# Patient Record
Sex: MALE | Race: WHITE | NOT HISPANIC OR LATINO | Employment: OTHER | URBAN - METROPOLITAN AREA
[De-identification: names, ages, dates, MRNs, and addresses within clinical notes are randomized per-mention and may not be internally consistent; named-entity substitution may affect disease eponyms.]

---

## 2017-02-22 ENCOUNTER — PATIENT OUTREACH (OUTPATIENT)
Dept: ADMINISTRATIVE | Facility: HOSPITAL | Age: 57
End: 2017-02-22

## 2017-03-08 ENCOUNTER — OFFICE VISIT (OUTPATIENT)
Dept: INTERNAL MEDICINE | Facility: CLINIC | Age: 57
End: 2017-03-08
Attending: INTERNAL MEDICINE
Payer: COMMERCIAL

## 2017-03-08 VITALS
SYSTOLIC BLOOD PRESSURE: 108 MMHG | OXYGEN SATURATION: 96 % | HEART RATE: 76 BPM | HEIGHT: 76 IN | BODY MASS INDEX: 28.19 KG/M2 | DIASTOLIC BLOOD PRESSURE: 72 MMHG | WEIGHT: 231.5 LBS

## 2017-03-08 DIAGNOSIS — Z12.12 SCREENING FOR COLORECTAL CANCER: ICD-10-CM

## 2017-03-08 DIAGNOSIS — Z12.11 SCREENING FOR COLORECTAL CANCER: ICD-10-CM

## 2017-03-08 DIAGNOSIS — H91.92 HEARING LOSS OF LEFT EAR, UNSPECIFIED HEARING LOSS TYPE: ICD-10-CM

## 2017-03-08 DIAGNOSIS — K21.9 GASTROESOPHAGEAL REFLUX DISEASE, ESOPHAGITIS PRESENCE NOT SPECIFIED: ICD-10-CM

## 2017-03-08 DIAGNOSIS — Z11.59 NEED FOR HEPATITIS C SCREENING TEST: ICD-10-CM

## 2017-03-08 DIAGNOSIS — Z00.00 ANNUAL PHYSICAL EXAM: Primary | ICD-10-CM

## 2017-03-08 PROCEDURE — 1160F RVW MEDS BY RX/DR IN RCRD: CPT | Mod: S$GLB,,, | Performed by: INTERNAL MEDICINE

## 2017-03-08 PROCEDURE — 99214 OFFICE O/P EST MOD 30 MIN: CPT | Mod: S$GLB,,, | Performed by: INTERNAL MEDICINE

## 2017-03-08 PROCEDURE — 99999 PR PBB SHADOW E&M-EST. PATIENT-LVL IV: CPT | Mod: PBBFAC,,, | Performed by: INTERNAL MEDICINE

## 2017-03-08 NOTE — PROGRESS NOTES
"Subjective:       Patient ID: Valentin Brito is a 56 y.o. male.    Chief Complaint: Annual Exam    HPI Comments: Here for annual exam    Taking protonix daily. He denies daily symptoms of heartburn with eating but endorses at night when he lies only on his right side he will have sensation of contents filling his esophagus or a "tightness in throat". He denies choking, coughing, sour taste in mouth. He eats dinner at least 4-5 hours prior to lying down.     Left sided hearing loss. He reports it has been ongoing for years and has continued to worsen to the point where he is now turning his right ear to others so he can hear. He denies tinnitus, vertigo, nasal congestion, rhinorrhea.       Review of Systems   Constitutional: Negative for appetite change, chills, fever and unexpected weight change.   HENT: Positive for hearing loss. Negative for congestion, ear discharge, nosebleeds, postnasal drip, rhinorrhea, sinus pressure, sneezing, sore throat and trouble swallowing.    Eyes: Negative for visual disturbance.   Respiratory: Negative for cough, chest tightness and shortness of breath.    Cardiovascular: Negative for chest pain and leg swelling.   Gastrointestinal: Negative for abdominal pain, blood in stool, constipation, diarrhea, nausea and vomiting.   Endocrine: Negative for polydipsia and polyuria.   Genitourinary: Negative for decreased urine volume, difficulty urinating, dysuria, frequency and urgency.   Musculoskeletal: Negative for gait problem.   Skin: Negative for rash.   Neurological: Negative for dizziness and numbness.   Psychiatric/Behavioral: The patient is not nervous/anxious.        History reviewed. No pertinent past medical history.  Past Surgical History:   Procedure Laterality Date    ABDOMINAL HERNIA REPAIR      HEEL SPUR SURGERY Right     HERNIA REPAIR Bilateral     TONSILLECTOMY       Family History   Problem Relation Age of Onset    Hyperlipidemia Father      Social History     Social " "History    Marital status:      Spouse name: N/A    Number of children: N/A    Years of education: N/A     Occupational History    Not on file.     Social History Main Topics    Smoking status: Never Smoker    Smokeless tobacco: Not on file    Alcohol use Yes    Drug use: No    Sexual activity: Not on file     Other Topics Concern    Not on file     Social History Narrative         Objective:      Vitals:    03/08/17 1058   BP: 108/72   Pulse: 76   SpO2: 96%   Weight: 105 kg (231 lb 7.7 oz)   Height: 6' 4" (1.93 m)      Physical Exam   Constitutional: He is oriented to person, place, and time. He appears well-developed and well-nourished. No distress.   HENT:   Head: Normocephalic and atraumatic.   Mouth/Throat: Oropharynx is clear and moist. No oropharyngeal exudate.   Eyes: Conjunctivae and EOM are normal. Pupils are equal, round, and reactive to light. No scleral icterus.   Neck: No thyromegaly present.   Cardiovascular: Normal rate, regular rhythm and normal heart sounds.    No murmur heard.  Pulmonary/Chest: Effort normal and breath sounds normal. He has no wheezes. He has no rales.   Abdominal: Soft. He exhibits no distension. There is no tenderness.   Musculoskeletal: He exhibits no edema or tenderness.   Lymphadenopathy:     He has no cervical adenopathy.   Neurological: He is alert and oriented to person, place, and time.   Skin: Skin is warm and dry.   Psychiatric: He has a normal mood and affect. His behavior is normal.       Assessment:       1. Annual physical exam    2. Need for hepatitis C screening test    3. Screening for colorectal cancer    4. Gastroesophageal reflux disease, esophagitis presence not specified    5. Hearing loss of left ear, unspecified hearing loss type        Plan:       Valentin was seen today for annual exam.    Diagnoses and all orders for this visit:    Annual physical exam  -     Comprehensive metabolic panel; Future  -     Hemoglobin A1c; Future  -     Lipid " panel; Future  -     PSA, Screening; Future    Need for hepatitis C screening test  -     Hepatitis C antibody; Future    Screening for colorectal cancer  -     Ambulatory referral to Gastroenterology    Gastroesophageal reflux disease, esophagitis presence not specified  -     Ambulatory Referral to Gastroenterology    Hearing loss of left ear, unspecified hearing loss type  -     Ambulatory Referral to ENT                 Side effects of medication(s) were discussed in detail and patient voiced understanding.  Patient will call back for any issues or complications.

## 2017-03-08 NOTE — MR AVS SNAPSHOT
[facility]  [address]  [place] [address]  Phone: [phone]  Fax: [phone]                  Valentin Brito   3/8/2017 11:00 AM   Office Visit    Description:  Male : 1960   Provider:  Francisco Mcnally MD   Department:  [facility]           Reason for Visit     Annual Exam           Diagnoses this Visit        Comments    Need for hepatitis C screening test    -  Primary     Need for Tdap vaccination         Screening for colorectal cancer         TSH (thyroid-stimulating hormone deficiency)         Gastroesophageal reflux disease, esophagitis presence not specified         Annual physical exam         Hearing loss of left ear, unspecified hearing loss type                To Do List           Future Appointments        Provider Department Dept Phone    3/9/2017 8:30 AM LAB, BAP [facility] [phone]    2017 10:00 AM AUDIOGRAM, AUDIO [facility] Audiology [phone]    2017 10:30 AM Aditya Alexander III, MD [facility] Otorhinolaryngology [phone]      Goals (5 Years of Data)     None      [facility] On [facility]     [facility] Care Line -  Assistance  Registered nurses in the [facility] Center provide clinical advisement, health education, appointment booking, and other advisory services.  Call for this free service at [phone].             Medications           Message regarding Medications     Verify the changes and/or additions to your medication regime listed below are the same as discussed with your clinician today.  If any of these changes or additions are incorrect, please notify your healthcare provider.             Verify that the below list of medications is an accurate representation of the medications you are currently taking.  If none reported, the list may be blank. If incorrect, please contact your healthcare provider. Carry this list with you in case of emergency.          "  Current Medications     atorvastatin (LIPITOR) 20 MG tablet Take 1 tablet (20 mg total) by mouth once daily.    pantoprazole (PROTONIX) 40 MG tablet Take 1 tablet (40 mg total) by mouth 2 (two) times daily.           Clinical Reference Information           Your Vitals Were     BP Pulse Height Weight SpO2 BMI    108/72 76 6' 4" (1.93 m) 105 kg (231 lb 7.7 oz) 96% 28.18 kg/m2      Blood Pressure          Most Recent Value    BP  108/72      Allergies as of 3/8/2017     No Known Allergies      Immunizations Administered on Date of Encounter - 3/8/2017     None      Orders Placed During Today's Visit      Normal Orders This Visit    Ambulatory Referral to ENT     Ambulatory referral to Gastroenterology     Ambulatory Referral to Gastroenterology     Future Labs/Procedures Expected by Expires    Comprehensive metabolic panel  3/8/2017 5/7/2018    Hemoglobin A1c  3/8/2017 5/7/2018    Hepatitis C antibody  3/8/2017 4/23/2018    Lipid panel  3/8/2017 3/8/2018    PSA, Screening  3/8/2017 6/6/2017      Language Assistance Services     ATTENTION: Language assistance services are available, free of charge. Please call [phone].      ATENCIÓN: Si habla español, tiene a ac disposición servicios gratuitos de asistencia lingüística. Llame al [phone].     RUPERT Ý: N?u b?n nói Ti?ng Vi?t, có các d?ch v? h? tr? ngôn ng? mi?n phí dành cho b?n. G?i s? [phone].         [facility] complies with applicable Federal civil rights laws and does not discriminate on the basis of race, color, national origin, age, disability, or sex.        "

## 2017-03-09 ENCOUNTER — LAB VISIT (OUTPATIENT)
Dept: LAB | Facility: OTHER | Age: 57
End: 2017-03-09
Attending: INTERNAL MEDICINE
Payer: COMMERCIAL

## 2017-03-09 DIAGNOSIS — Z00.00 ANNUAL PHYSICAL EXAM: ICD-10-CM

## 2017-03-09 DIAGNOSIS — Z11.59 NEED FOR HEPATITIS C SCREENING TEST: ICD-10-CM

## 2017-03-09 LAB
ALBUMIN SERPL BCP-MCNC: 4.1 G/DL
ALP SERPL-CCNC: 71 U/L
ALT SERPL W/O P-5'-P-CCNC: 19 U/L
ANION GAP SERPL CALC-SCNC: 9 MMOL/L
AST SERPL-CCNC: 17 U/L
BILIRUB SERPL-MCNC: 0.8 MG/DL
BUN SERPL-MCNC: 16 MG/DL
CALCIUM SERPL-MCNC: 9.5 MG/DL
CHLORIDE SERPL-SCNC: 105 MMOL/L
CHOLEST/HDLC SERPL: 3.6 {RATIO}
CO2 SERPL-SCNC: 27 MMOL/L
COMPLEXED PSA SERPL-MCNC: 0.51 NG/ML
CREAT SERPL-MCNC: 1 MG/DL
EST. GFR  (AFRICAN AMERICAN): >60 ML/MIN/1.73 M^2
EST. GFR  (NON AFRICAN AMERICAN): >60 ML/MIN/1.73 M^2
ESTIMATED AVG GLUCOSE: 111 MG/DL
GLUCOSE SERPL-MCNC: 100 MG/DL
HBA1C MFR BLD HPLC: 5.5 %
HCV AB SERPL QL IA: NEGATIVE
HDL/CHOLESTEROL RATIO: 27.5 %
HDLC SERPL-MCNC: 178 MG/DL
HDLC SERPL-MCNC: 49 MG/DL
LDLC SERPL CALC-MCNC: 99.8 MG/DL
NONHDLC SERPL-MCNC: 129 MG/DL
POTASSIUM SERPL-SCNC: 4.2 MMOL/L
PROT SERPL-MCNC: 7.6 G/DL
SODIUM SERPL-SCNC: 141 MMOL/L
TRIGL SERPL-MCNC: 146 MG/DL

## 2017-03-09 PROCEDURE — 86803 HEPATITIS C AB TEST: CPT

## 2017-03-09 PROCEDURE — 83036 HEMOGLOBIN GLYCOSYLATED A1C: CPT

## 2017-03-09 PROCEDURE — 80061 LIPID PANEL: CPT

## 2017-03-09 PROCEDURE — 84153 ASSAY OF PSA TOTAL: CPT

## 2017-03-09 PROCEDURE — 36415 COLL VENOUS BLD VENIPUNCTURE: CPT

## 2017-03-09 PROCEDURE — 80053 COMPREHEN METABOLIC PANEL: CPT

## 2017-04-18 DIAGNOSIS — E78.5 HYPERLIPIDEMIA, UNSPECIFIED HYPERLIPIDEMIA TYPE: ICD-10-CM

## 2017-04-18 RX ORDER — ATORVASTATIN CALCIUM 20 MG/1
20 TABLET, FILM COATED ORAL DAILY
Qty: 90 TABLET | Refills: 1 | Status: SHIPPED | OUTPATIENT
Start: 2017-04-18 | End: 2017-05-22 | Stop reason: SDUPTHER

## 2017-04-18 NOTE — TELEPHONE ENCOUNTER
----- Message from Adri Fierro sent at 4/18/2017  3:26 PM CDT -----  Contact: :Sherry leon  _x  1st Request  _  2nd Request  _  3rd Request      Who:Sherry zayasCanovanass    Why: would like to speak to staff in regards to Atorvastatin 20mg     What Number to Call Back: [phone]    When to Expect a call back: (Before the end of the day)   -- if call after 3:00 call back will be tomorrow.

## 2017-05-01 ENCOUNTER — OFFICE VISIT (OUTPATIENT)
Dept: OTOLARYNGOLOGY | Facility: CLINIC | Age: 57
End: 2017-05-01
Payer: COMMERCIAL

## 2017-05-01 ENCOUNTER — CLINICAL SUPPORT (OUTPATIENT)
Dept: AUDIOLOGY | Facility: CLINIC | Age: 57
End: 2017-05-01
Payer: COMMERCIAL

## 2017-05-01 VITALS
HEIGHT: 77 IN | TEMPERATURE: 98 F | HEART RATE: 57 BPM | DIASTOLIC BLOOD PRESSURE: 80 MMHG | BODY MASS INDEX: 27.07 KG/M2 | WEIGHT: 229.25 LBS | SYSTOLIC BLOOD PRESSURE: 113 MMHG

## 2017-05-01 DIAGNOSIS — J34.2 NASAL SEPTAL DEVIATION: ICD-10-CM

## 2017-05-01 DIAGNOSIS — Z77.122 HISTORY OF EXPOSURE TO NOISE: ICD-10-CM

## 2017-05-01 PROCEDURE — 1160F RVW MEDS BY RX/DR IN RCRD: CPT | Mod: S$GLB,,, | Performed by: OTOLARYNGOLOGY

## 2017-05-01 PROCEDURE — 99999 PR PBB SHADOW E&M-EST. PATIENT-LVL III: CPT | Mod: PBBFAC,,, | Performed by: OTOLARYNGOLOGY

## 2017-05-01 PROCEDURE — 92550 TYMPANOMETRY & REFLEX THRESH: CPT | Mod: S$GLB,,, | Performed by: AUDIOLOGIST

## 2017-05-01 PROCEDURE — 99203 OFFICE O/P NEW LOW 30 MIN: CPT | Mod: S$GLB,,, | Performed by: OTOLARYNGOLOGY

## 2017-05-01 PROCEDURE — 92557 COMPREHENSIVE HEARING TEST: CPT | Mod: S$GLB,,, | Performed by: AUDIOLOGIST
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
## 2017-05-01 NOTE — LETTER
May 2, 2017      Francisco Mcnally MD  [address]  [address]  [facility] [address]           [facility]  [address]  [facility] [address]  Phone: [phone]  Fax: [phone]          Patient: Valentin Brito   MR Number: [id number]   YOB: 1960   Date of Visit: 5/1/2017       Dear Dr. Francisco Mcnally:    Thank you for referring Valentin Brito to me for evaluation. Attached you will find relevant portions of my assessment and plan of care.    If you have questions, please do not hesitate to call me. I look forward to following Valentin Brito along with you.    Sincerely,    Aditya Alexander III, MD    Enclosure  CC:  No Recipients    If you would like to receive this communication electronically, please contact [email or web address] or [phone] to request more information on [employer] access.    For providers and/or their staff who would like to refer a patient to [facility], please contact us through our one-stop-shop provider referral line, [facility], at [phone].    If you feel you have received this communication in error or would no longer like to receive these types of communications, please e-mail [email or web address]

## 2017-05-01 NOTE — PATIENT INSTRUCTIONS
Audiometry reviewed: asymmetric SNHL; AS > AD  Pt. is a candidate for hearing amplification for the left ( or both) ear(s)  copy of audiogram/NORMA Altman's card provided  MRI brain ordered with contrast; call for results

## 2017-05-02 ENCOUNTER — PATIENT MESSAGE (OUTPATIENT)
Dept: OTOLARYNGOLOGY | Facility: CLINIC | Age: 57
End: 2017-05-02

## 2017-05-08 ENCOUNTER — DOCUMENTATION ONLY (OUTPATIENT)
Dept: INTERNAL MEDICINE | Facility: CLINIC | Age: 57
End: 2017-05-08

## 2017-05-08 NOTE — PROGRESS NOTES
Outside records received and reviewed    EGD and colonoscopy done 5/3/17 Grade I internal hemorrhoids, multiple polyp (largest 8mm), and few sigmoid diverticula. Small hiatal hernia and erythema of antrum.;    Records have been submitted to [facility] medical records department to be scanned in to chart in EPIC under the media tab.

## 2017-05-09 ENCOUNTER — HOSPITAL ENCOUNTER (OUTPATIENT)
Dept: RADIOLOGY | Facility: HOSPITAL | Age: 57
Discharge: HOME | End: 2017-05-09
Attending: OTOLARYNGOLOGY
Payer: COMMERCIAL

## 2017-05-09 PROCEDURE — 70553 MRI BRAIN STEM W/O & W/DYE: CPT | Mod: 26,,, | Performed by: RADIOLOGY

## 2017-05-09 PROCEDURE — 25500020 PHARM REV CODE 255: Performed by: OTOLARYNGOLOGY

## 2017-05-09 PROCEDURE — 70553 MRI BRAIN STEM W/O & W/DYE: CPT | Mod: TC

## 2017-05-09 PROCEDURE — A9585 GADOBUTROL INJECTION: HCPCS | Performed by: OTOLARYNGOLOGY

## 2017-05-09 RX ORDER — GADOBUTROL 604.72 MG/ML
10 INJECTION INTRAVENOUS
Status: COMPLETED | OUTPATIENT
Start: 2017-05-09 | End: 2017-05-09

## 2017-05-09 RX ADMIN — GADOBUTROL 10 ML: 604.72 INJECTION INTRAVENOUS at 09:05

## 2017-05-22 DIAGNOSIS — K21.9 GASTROESOPHAGEAL REFLUX DISEASE, ESOPHAGITIS PRESENCE NOT SPECIFIED: ICD-10-CM

## 2017-05-22 DIAGNOSIS — E78.5 HYPERLIPIDEMIA, UNSPECIFIED HYPERLIPIDEMIA TYPE: ICD-10-CM

## 2017-05-22 RX ORDER — PANTOPRAZOLE SODIUM 40 MG/1
TABLET, DELAYED RELEASE ORAL
Qty: 180 TABLET | Refills: 3 | Status: SHIPPED | OUTPATIENT
Start: 2017-05-22 | End: 2018-03-02

## 2017-05-22 RX ORDER — ATORVASTATIN CALCIUM 20 MG/1
TABLET, FILM COATED ORAL
Qty: 90 TABLET | Refills: 3 | Status: SHIPPED | OUTPATIENT
Start: 2017-05-22 | End: 2018-03-02 | Stop reason: SDUPTHER

## 2017-06-18 ENCOUNTER — PATIENT MESSAGE (OUTPATIENT)
Dept: OTOLARYNGOLOGY | Facility: CLINIC | Age: 57
End: 2017-06-18

## 2017-07-09 ENCOUNTER — PATIENT MESSAGE (OUTPATIENT)
Dept: OTOLARYNGOLOGY | Facility: CLINIC | Age: 57
End: 2017-07-09

## 2017-07-10 DIAGNOSIS — Z12.11 COLON CANCER SCREENING: ICD-10-CM

## 2017-07-18 ENCOUNTER — OFFICE VISIT (OUTPATIENT)
Dept: INTERNAL MEDICINE | Facility: CLINIC | Age: 57
End: 2017-07-18
Attending: INTERNAL MEDICINE
Payer: COMMERCIAL

## 2017-07-18 VITALS
DIASTOLIC BLOOD PRESSURE: 74 MMHG | WEIGHT: 229.06 LBS | HEART RATE: 68 BPM | SYSTOLIC BLOOD PRESSURE: 110 MMHG | BODY MASS INDEX: 27.05 KG/M2 | HEIGHT: 77 IN

## 2017-07-18 DIAGNOSIS — R36.1 BLOODY EJACULATION: Primary | ICD-10-CM

## 2017-07-18 DIAGNOSIS — D22.9 ATYPICAL MOLE: ICD-10-CM

## 2017-07-18 LAB
BILIRUB SERPL-MCNC: NORMAL MG/DL
BILIRUB UR QL STRIP: NEGATIVE
BLOOD URINE, POC: NORMAL
CLARITY UR: CLEAR
COLOR UR: YELLOW
COLOR, POC UA: NORMAL
GLUCOSE UR QL STRIP: NEGATIVE
GLUCOSE UR QL STRIP: NORMAL
HGB UR QL STRIP: ABNORMAL
KETONES UR QL STRIP: NEGATIVE
KETONES UR QL STRIP: NORMAL
LEUKOCYTE ESTERASE UR QL STRIP: NEGATIVE
LEUKOCYTE ESTERASE URINE, POC: NORMAL
NITRITE UR QL STRIP: NEGATIVE
NITRITE, POC UA: NORMAL
PH UR STRIP: 6 [PH] (ref 5–8)
PH, POC UA: 5
PROT UR QL STRIP: NEGATIVE
PROTEIN, POC: NORMAL
SP GR UR STRIP: 1.02 (ref 1–1.03)
SPECIFIC GRAVITY, POC UA: 1.02
URN SPEC COLLECT METH UR: ABNORMAL
UROBILINOGEN UR STRIP-ACNC: NEGATIVE EU/DL
UROBILINOGEN, POC UA: NORMAL

## 2017-07-18 PROCEDURE — 99999 PR PBB SHADOW E&M-EST. PATIENT-LVL IV: CPT | Mod: PBBFAC,,, | Performed by: INTERNAL MEDICINE

## 2017-07-18 PROCEDURE — 88112 CYTOPATH CELL ENHANCE TECH: CPT | Mod: 26,,, | Performed by: PATHOLOGY

## 2017-07-18 PROCEDURE — 81003 URINALYSIS AUTO W/O SCOPE: CPT

## 2017-07-18 PROCEDURE — 99214 OFFICE O/P EST MOD 30 MIN: CPT | Mod: 25,S$GLB,, | Performed by: INTERNAL MEDICINE

## 2017-07-18 PROCEDURE — 88112 CYTOPATH CELL ENHANCE TECH: CPT | Performed by: PATHOLOGY

## 2017-07-18 PROCEDURE — 81001 URINALYSIS AUTO W/SCOPE: CPT | Mod: S$GLB,,, | Performed by: INTERNAL MEDICINE

## 2017-07-18 RX ORDER — ASPIRIN 325 MG
81 TABLET ORAL DAILY
COMMUNITY
End: 2022-08-31

## 2017-08-03 ENCOUNTER — OFFICE VISIT (OUTPATIENT)
Dept: UROLOGY | Facility: CLINIC | Age: 57
End: 2017-08-03
Attending: UROLOGY
Payer: COMMERCIAL

## 2017-08-03 VITALS
DIASTOLIC BLOOD PRESSURE: 77 MMHG | BODY MASS INDEX: 27.04 KG/M2 | HEART RATE: 75 BPM | HEIGHT: 77 IN | SYSTOLIC BLOOD PRESSURE: 124 MMHG | WEIGHT: 229 LBS

## 2017-08-03 DIAGNOSIS — R30.9 PAINFUL URINATION: ICD-10-CM

## 2017-08-03 DIAGNOSIS — R36.1 HEMATOSPERMIA: Primary | ICD-10-CM

## 2017-08-03 LAB
BILIRUB SERPL-MCNC: ABNORMAL MG/DL
BLOOD URINE, POC: ABNORMAL
COLOR, POC UA: YELLOW
GLUCOSE UR QL STRIP: ABNORMAL
KETONES UR QL STRIP: ABNORMAL
LEUKOCYTE ESTERASE URINE, POC: ABNORMAL
NITRITE, POC UA: ABNORMAL
PH, POC UA: 5
PROTEIN, POC: ABNORMAL
SPECIFIC GRAVITY, POC UA: 1.01
UROBILINOGEN, POC UA: ABNORMAL

## 2017-08-03 PROCEDURE — 99244 OFF/OP CNSLTJ NEW/EST MOD 40: CPT | Mod: 25,S$GLB,, | Performed by: UROLOGY

## 2017-08-03 PROCEDURE — 81002 URINALYSIS NONAUTO W/O SCOPE: CPT | Mod: S$GLB,,, | Performed by: UROLOGY

## 2017-08-03 NOTE — LETTER
August 3, 2017        Francisco Mcnally MD  [address]  [address]  [facility] [address]             [facility]  [address] [place] [address], [facility] [address]  [facility] [address]  Phone: [phone]   Patient: Valentin Brito   MR Number: [id number]   YOB: 1960   Date of Visit: 8/3/2017       Dear Dr. Mcnally:    Thank you for referring Valentin Brito to me for evaluation. Below are the relevant portions of my assessment and plan of care.            If you have questions, please do not hesitate to call me. I look forward to following Valentin along with you.    Sincerely,      MD JOCELYNE Tate MD

## 2017-08-03 NOTE — PROGRESS NOTES
"Subjective:      Valentin Brito is a 56 y.o. male who was referred by Francisco Mcnally MD for evaluation of hematospermia.      In early July he had about 1 week of persistent hematospermia. It has since resolved. He had no other symptoms at that time including dysuria and hematuria. He was in [place] at the time and physician friend there did extensive workup including renal US and PSA, which were all normal (PSA < 1).    His only other related c/o is painful urination first thing in the morning that is relieved after he voids. He also has BM at that time. This is not every day. It has been present for 3-4 months.    The following portions of the patient's history were reviewed and updated as appropriate: allergies, current medications, past family history, past medical history, past social history, past surgical history and problem list.    Review of Systems  Constitutional: no fever or chills  ENT: no nasal congestion or sore throat  Respiratory: no cough or shortness of breath  Cardiovascular: no chest pain or palpitations  Gastrointestinal: no nausea or vomiting, tolerating diet  Genitourinary: as per HPI  Hematologic/Lymphatic: no easy bruising or lymphadenopathy  Musculoskeletal: no arthralgias or myalgias  Neurological: no seizures or tremors  Behavioral/Psych: no auditory or visual hallucinations     Objective:   Vitals: /77 (BP Location: Right arm, Patient Position: Sitting, BP Method: Automatic)   Pulse 75   Ht 6' 5" (1.956 m)   Wt 103.9 kg (229 lb)   BMI 27.16 kg/m²     Physical Exam   General: alert and oriented, no acute distress  Head: normocephalic, atraumatic  Neck: supple, no lymphadenopathy, normal ROM, no masses  Respiratory: Symmetric expansion, non-labored breathing  Cardiovascular: regular rate and rhythm, nomal pulses, no peripheral edema  Abdomen: soft, non tender, non distended, no palpable masses, no hernias, no hepatomegaly or splenomegaly  Genitourinary:   Penis: normal, no " lesions, patent orthotopic meatus, no plaques  Scrotum: no rashes or skin changes;   Testes: descended bilaterally, no masses, nontender, normal epididymides bilaterally, no hydroceles  Prostate: normal for age, normal consistency, non tender, no specific nodules, size: 25 gm; seminal vesicles not palpated  Rectum: normal rectal tone, no rectal mass, normal perineum  Lymphatic: no inguinal nodes  Skin: normal coloration and turgor, no rashes, no suspicious skin lesions noted  Neuro: alert and oriented x3, no gross deficits  Psych: normal judgment and insight, normal mood/affect and non-anxious    Lab Review   Urinalysis demonstrates negative for all components  Lab Results   Component Value Date    WBC 7.10 03/07/2016    HGB 16.5 03/07/2016    HCT 47.4 03/07/2016    MCV 87 03/07/2016     03/07/2016     Lab Results   Component Value Date    CREATININE 1.0 03/09/2017    BUN 16 03/09/2017     Lab Results   Component Value Date    PSA 0.51 03/09/2017       Assessment:     1. Hematospermia    2. Painful urination        Plan:   1. Reassured regarding hematospermia, mostly likely 2/2 mild infection. No concern for malignancy.  2. Painful morning void possible 2/2 mild BPH; discussed treatment such as flomax but he wishes to defer.  3. FU PRN

## 2017-12-13 ENCOUNTER — OFFICE VISIT (OUTPATIENT)
Dept: OTOLARYNGOLOGY | Facility: CLINIC | Age: 57
End: 2017-12-13
Payer: COMMERCIAL

## 2017-12-13 ENCOUNTER — CLINICAL SUPPORT (OUTPATIENT)
Dept: AUDIOLOGY | Facility: CLINIC | Age: 57
End: 2017-12-13

## 2017-12-13 VITALS — WEIGHT: 227.06 LBS | BODY MASS INDEX: 26.81 KG/M2 | HEIGHT: 77 IN

## 2017-12-13 DIAGNOSIS — H90.3 ASYMMETRIC SNHL (SENSORINEURAL HEARING LOSS): Primary | ICD-10-CM

## 2017-12-13 PROCEDURE — 99214 OFFICE O/P EST MOD 30 MIN: CPT | Mod: S$GLB,,, | Performed by: OTOLARYNGOLOGY

## 2017-12-13 PROCEDURE — 99999 PR PBB SHADOW E&M-EST. PATIENT-LVL II: CPT | Mod: PBBFAC,,, | Performed by: OTOLARYNGOLOGY
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
## 2017-12-13 NOTE — PROGRESS NOTES
Mr. Brito was seen for a hearing aid consultation on today's date. He was accompanied to today's appointment by his wife. Binaural amplification was recommended. Discussed monaural vs. binaural amplification. Discussed pros and cons of various styles and technology levels. Also discussed trial with [employer] in the event that the patient does not feel the left hearing aid is beneficial for him. Patient acknowledged understanding of the 30 day trial period, $250 restocking fee from the time of order, and the fact that we do not file insurance on behalf of the patient. Patient was advised to call or email with any questions. He would like to check with his insurance before proceeding.

## 2017-12-13 NOTE — LETTER
December 13, 2017      Aditya Alexander III, MD  [address]  [facility] [address]           Alexi Haim - Otorhinolaryngology  [address]  [facility] [address]  Phone: [phone]  Fax: [phone]          Patient: Valentin Brito   MR Number: [id number]   YOB: 1960   Date of Visit: 12/13/2017       Dear Dr. Aditya Alexander III:    Thank you for referring Valentin Brito to me for evaluation. Attached you will find relevant portions of my assessment and plan of care.    If you have questions, please do not hesitate to call me. I look forward to following Valentin Brito along with you.    Sincerely,    John Pena MD    Enclosure  CC:  No Recipients    If you would like to receive this communication electronically, please contact [email or web address] or [phone] to request more information on [employer] access.    For providers and/or their staff who would like to refer a patient to [facility], please contact us through our one-stop-shop provider referral line, [facility], at [phone].    If you feel you have received this communication in error or would no longer like to receive these types of communications, please e-mail [email or web address]

## 2018-03-01 ENCOUNTER — PATIENT OUTREACH (OUTPATIENT)
Dept: INTERNAL MEDICINE | Facility: CLINIC | Age: 58
End: 2018-03-01

## 2018-03-01 NOTE — PROGRESS NOTES
[facility] is committed to your overall health.  To help you get the most out of each of your visits, we will review your information to make sure you are up to date on all of your recommended tests and/or procedures.       Your PCP  Francisco Mcnally MD   found that you may be due for:       Health Maintenance Due   Topic Date Due    Influenza Vaccine  08/01/2017             If you have had any of the above done at another facility, please bring the records or information with you so that your record at [facility] will be complete.  If you would like to schedule any of these, please contact me.     If you are currently taking medication, please bring it with you to your appointment for review.     Also, if you have any type of Advanced Directives, please bring them with you to your office visit so we may scan them into your chart.       Thank you for Choosing [facility] for your healthcare needs.        Additional Information  If you have questions, you can email [email or web address] or call [phone]  to talk to our [facility] staff. Remember, [facility] is NOT to be used for urgent needs. For medical emergencies, dial 911.

## 2018-03-02 ENCOUNTER — OFFICE VISIT (OUTPATIENT)
Dept: INTERNAL MEDICINE | Facility: CLINIC | Age: 58
End: 2018-03-02
Attending: INTERNAL MEDICINE
Payer: COMMERCIAL

## 2018-03-02 ENCOUNTER — LAB VISIT (OUTPATIENT)
Dept: LAB | Facility: OTHER | Age: 58
End: 2018-03-02
Attending: INTERNAL MEDICINE
Payer: COMMERCIAL

## 2018-03-02 VITALS
DIASTOLIC BLOOD PRESSURE: 66 MMHG | HEART RATE: 62 BPM | HEIGHT: 77 IN | SYSTOLIC BLOOD PRESSURE: 98 MMHG | WEIGHT: 226.63 LBS | BODY MASS INDEX: 26.76 KG/M2

## 2018-03-02 DIAGNOSIS — E78.5 HYPERLIPIDEMIA, UNSPECIFIED HYPERLIPIDEMIA TYPE: ICD-10-CM

## 2018-03-02 DIAGNOSIS — Z00.00 ANNUAL PHYSICAL EXAM: ICD-10-CM

## 2018-03-02 DIAGNOSIS — Z00.00 ANNUAL PHYSICAL EXAM: Primary | ICD-10-CM

## 2018-03-02 DIAGNOSIS — K21.9 GASTROESOPHAGEAL REFLUX DISEASE, ESOPHAGITIS PRESENCE NOT SPECIFIED: ICD-10-CM

## 2018-03-02 LAB
ALBUMIN SERPL BCP-MCNC: 4.4 G/DL
ALP SERPL-CCNC: 70 U/L
ALT SERPL W/O P-5'-P-CCNC: 19 U/L
ANION GAP SERPL CALC-SCNC: 9 MMOL/L
AST SERPL-CCNC: 17 U/L
BASOPHILS # BLD AUTO: 0.01 K/UL
BASOPHILS NFR BLD: 0.1 %
BILIRUB SERPL-MCNC: 0.7 MG/DL
BUN SERPL-MCNC: 14 MG/DL
CALCIUM SERPL-MCNC: 9.2 MG/DL
CHLORIDE SERPL-SCNC: 105 MMOL/L
CHOLEST SERPL-MCNC: 166 MG/DL
CHOLEST/HDLC SERPL: 3.5 {RATIO}
CO2 SERPL-SCNC: 26 MMOL/L
CREAT SERPL-MCNC: 0.8 MG/DL
DIFFERENTIAL METHOD: NORMAL
EOSINOPHIL # BLD AUTO: 0 K/UL
EOSINOPHIL NFR BLD: 0.4 %
ERYTHROCYTE [DISTWIDTH] IN BLOOD BY AUTOMATED COUNT: 12.8 %
EST. GFR  (AFRICAN AMERICAN): >60 ML/MIN/1.73 M^2
EST. GFR  (NON AFRICAN AMERICAN): >60 ML/MIN/1.73 M^2
ESTIMATED AVG GLUCOSE: 100 MG/DL
GLUCOSE SERPL-MCNC: 92 MG/DL
HBA1C MFR BLD HPLC: 5.1 %
HCT VFR BLD AUTO: 46.1 %
HDLC SERPL-MCNC: 48 MG/DL
HDLC SERPL: 28.9 %
HGB BLD-MCNC: 15.9 G/DL
LDLC SERPL CALC-MCNC: 87.4 MG/DL
LYMPHOCYTES # BLD AUTO: 2.2 K/UL
LYMPHOCYTES NFR BLD: 30.9 %
MCH RBC QN AUTO: 30.6 PG
MCHC RBC AUTO-ENTMCNC: 34.5 G/DL
MCV RBC AUTO: 89 FL
MONOCYTES # BLD AUTO: 0.5 K/UL
MONOCYTES NFR BLD: 7.2 %
NEUTROPHILS # BLD AUTO: 4.4 K/UL
NEUTROPHILS NFR BLD: 61.4 %
NONHDLC SERPL-MCNC: 118 MG/DL
PLATELET # BLD AUTO: 212 K/UL
PMV BLD AUTO: 11.8 FL
POTASSIUM SERPL-SCNC: 4.1 MMOL/L
PROT SERPL-MCNC: 7.7 G/DL
RBC # BLD AUTO: 5.2 M/UL
SODIUM SERPL-SCNC: 140 MMOL/L
TRIGL SERPL-MCNC: 153 MG/DL
TSH SERPL DL<=0.005 MIU/L-ACNC: 0.78 UIU/ML
WBC # BLD AUTO: 7.13 K/UL

## 2018-03-02 PROCEDURE — 99999 PR PBB SHADOW E&M-EST. PATIENT-LVL III: CPT | Mod: PBBFAC,,, | Performed by: INTERNAL MEDICINE

## 2018-03-02 PROCEDURE — 36415 COLL VENOUS BLD VENIPUNCTURE: CPT

## 2018-03-02 PROCEDURE — 83036 HEMOGLOBIN GLYCOSYLATED A1C: CPT

## 2018-03-02 PROCEDURE — 99396 PREV VISIT EST AGE 40-64: CPT | Mod: S$GLB,,, | Performed by: INTERNAL MEDICINE

## 2018-03-02 PROCEDURE — 80061 LIPID PANEL: CPT

## 2018-03-02 PROCEDURE — 84443 ASSAY THYROID STIM HORMONE: CPT

## 2018-03-02 PROCEDURE — 80053 COMPREHEN METABOLIC PANEL: CPT

## 2018-03-02 PROCEDURE — 85025 COMPLETE CBC W/AUTO DIFF WBC: CPT

## 2018-03-02 RX ORDER — ATORVASTATIN CALCIUM 20 MG/1
20 TABLET, FILM COATED ORAL DAILY
Qty: 90 TABLET | Refills: 3 | Status: SHIPPED | OUTPATIENT
Start: 2018-03-02 | End: 2019-02-13 | Stop reason: SDUPTHER

## 2018-12-03 ENCOUNTER — HOSPITAL ENCOUNTER (OUTPATIENT)
Dept: RADIOLOGY | Facility: OTHER | Age: 58
Discharge: HOME | End: 2018-12-03
Attending: INTERNAL MEDICINE
Payer: COMMERCIAL

## 2018-12-03 ENCOUNTER — OFFICE VISIT (OUTPATIENT)
Dept: INTERNAL MEDICINE | Facility: CLINIC | Age: 58
End: 2018-12-03
Attending: INTERNAL MEDICINE
Payer: COMMERCIAL

## 2018-12-03 VITALS
SYSTOLIC BLOOD PRESSURE: 112 MMHG | HEIGHT: 77 IN | BODY MASS INDEX: 27.13 KG/M2 | OXYGEN SATURATION: 98 % | WEIGHT: 229.75 LBS | HEART RATE: 62 BPM | DIASTOLIC BLOOD PRESSURE: 80 MMHG

## 2018-12-03 DIAGNOSIS — G89.29 CHRONIC NECK PAIN: Primary | ICD-10-CM

## 2018-12-03 DIAGNOSIS — M54.2 CHRONIC NECK PAIN: ICD-10-CM

## 2018-12-03 DIAGNOSIS — M54.2 CHRONIC NECK PAIN: Primary | ICD-10-CM

## 2018-12-03 DIAGNOSIS — M25.512 CHRONIC LEFT SHOULDER PAIN: ICD-10-CM

## 2018-12-03 DIAGNOSIS — G89.29 CHRONIC LEFT SHOULDER PAIN: ICD-10-CM

## 2018-12-03 DIAGNOSIS — M54.12 CERVICAL RADICULOPATHY: ICD-10-CM

## 2018-12-03 DIAGNOSIS — G89.29 CHRONIC NECK PAIN: ICD-10-CM

## 2018-12-03 PROCEDURE — 73030 X-RAY EXAM OF SHOULDER: CPT | Mod: 26,LT,, | Performed by: RADIOLOGY

## 2018-12-03 PROCEDURE — 3008F BODY MASS INDEX DOCD: CPT | Mod: CPTII,S$GLB,, | Performed by: INTERNAL MEDICINE

## 2018-12-03 PROCEDURE — 72050 X-RAY EXAM NECK SPINE 4/5VWS: CPT | Mod: 26,,, | Performed by: RADIOLOGY

## 2018-12-03 PROCEDURE — 99214 OFFICE O/P EST MOD 30 MIN: CPT | Mod: S$GLB,,, | Performed by: INTERNAL MEDICINE

## 2018-12-03 PROCEDURE — 99999 PR PBB SHADOW E&M-EST. PATIENT-LVL IV: CPT | Mod: PBBFAC,,, | Performed by: INTERNAL MEDICINE

## 2018-12-03 PROCEDURE — 72050 X-RAY EXAM NECK SPINE 4/5VWS: CPT | Mod: TC,FY

## 2018-12-03 PROCEDURE — 73030 X-RAY EXAM OF SHOULDER: CPT | Mod: TC,FY,LT

## 2018-12-03 RX ORDER — METHYLPREDNISOLONE 4 MG/1
TABLET ORAL
Qty: 1 PACKAGE | Refills: 0 | Status: SHIPPED | OUTPATIENT
Start: 2018-12-03 | End: 2019-03-21

## 2018-12-03 RX ORDER — DICLOFENAC SODIUM 75 MG/1
75 TABLET, DELAYED RELEASE ORAL 2 TIMES DAILY
Qty: 180 TABLET | Refills: 0 | Status: SHIPPED | OUTPATIENT
Start: 2018-12-03 | End: 2021-03-30

## 2018-12-03 NOTE — PROGRESS NOTES
"Subjective:       Patient ID: Valentin Brito is a 58 y.o. male.    Chief Complaint: Mass (L arm 6 months ) and Arm Pain (r arm pain x's 10 days )    Here for urgent visit    10 day Hx of right arm pain. Pain first noted while driving to [place] and is right scapular area and radiates through axilla to right posterior arm past, elbow and into forearm. He denies distal numbness/tingling or weakness.     6 month hx of left shoulder pain that occurs with lifting above his head. Patient denies radiation of pain, numbness/tingling of upper ext, weakness of arm or hand. No trauma.         Review of Systems   Constitutional: Negative for activity change and unexpected weight change.   HENT: Negative for hearing loss, rhinorrhea and trouble swallowing.    Eyes: Negative for discharge and visual disturbance.   Respiratory: Negative for chest tightness and wheezing.    Cardiovascular: Negative for chest pain and palpitations.   Gastrointestinal: Negative for blood in stool, constipation, diarrhea and vomiting.   Endocrine: Positive for polyuria. Negative for polydipsia.   Genitourinary: Negative for difficulty urinating, hematuria and urgency.   Musculoskeletal: Positive for arthralgias. Negative for joint swelling and neck pain.   Neurological: Positive for weakness. Negative for headaches.   Psychiatric/Behavioral: Negative for confusion and dysphoric mood.       Objective:      Vitals:    12/03/18 0815   BP: 112/80   Pulse: 62   SpO2: 98%   Weight: 104.2 kg (229 lb 11.5 oz)   Height: 6' 5" (1.956 m)      Physical Exam   Constitutional: He is oriented to person, place, and time. He appears well-developed and well-nourished. He does not have a sickly appearance. No distress.   HENT:   Head: Normocephalic and atraumatic.   Eyes: Conjunctivae and EOM are normal. Right eye exhibits no discharge. Left eye exhibits no discharge. No scleral icterus.   Pulmonary/Chest: Effort normal. No respiratory distress.   Abdominal: Normal " appearance. He exhibits no distension.   Musculoskeletal:        Right shoulder: He exhibits normal range of motion and no deformity.        Left shoulder: He exhibits decreased range of motion, deformity, abnormal pulse and decreased strength. He exhibits no tenderness.        Right elbow: He exhibits normal range of motion and no deformity.        Right hand: Normal sensation noted. Normal strength noted.        Left hand: Normal sensation noted. Normal strength noted.   Neurological: He is alert and oriented to person, place, and time.   Skin: Skin is warm and dry. He is not diaphoretic.   Psychiatric: He has a normal mood and affect. His speech is normal.       Assessment:       1. Chronic neck pain    2. Chronic left shoulder pain    3. Cervical radiculopathy        Plan:       Valentin was seen today for mass and arm pain.    Diagnoses and all orders for this visit:    Chronic neck pain  -     X-Ray Cervical Spine AP Lat with Flexion  Extension; Future    Chronic left shoulder pain  -     X-Ray Shoulder 2 or More Views Left; Future  -     Ambulatory referral to Orthopedics    Cervical radiculopathy  -     START methylPREDNISolone (MEDROL DOSEPACK) 4 mg tablet; use as directed  -     ONCE DONE WITH ABOVE CAN START diclofenac (VOLTAREN) 75 MG EC tablet; Take 1 tablet (75 mg total) by mouth 2 (two) times daily.               Side effects of medication(s) were discussed in detail and patient voiced understanding.  Patient will call back for any issues or complications.

## 2019-02-13 DIAGNOSIS — E78.5 HYPERLIPIDEMIA, UNSPECIFIED HYPERLIPIDEMIA TYPE: ICD-10-CM

## 2019-02-13 RX ORDER — ATORVASTATIN CALCIUM 20 MG/1
TABLET, FILM COATED ORAL
Qty: 90 TABLET | Refills: 3 | Status: SHIPPED | OUTPATIENT
Start: 2019-02-13 | End: 2020-02-18

## 2019-03-21 ENCOUNTER — OFFICE VISIT (OUTPATIENT)
Dept: INTERNAL MEDICINE | Facility: CLINIC | Age: 59
End: 2019-03-21
Attending: INTERNAL MEDICINE
Payer: COMMERCIAL

## 2019-03-21 VITALS
OXYGEN SATURATION: 96 % | HEIGHT: 77 IN | WEIGHT: 230.81 LBS | HEART RATE: 71 BPM | DIASTOLIC BLOOD PRESSURE: 76 MMHG | SYSTOLIC BLOOD PRESSURE: 122 MMHG | BODY MASS INDEX: 27.25 KG/M2

## 2019-03-21 DIAGNOSIS — Z12.5 PROSTATE CANCER SCREENING: ICD-10-CM

## 2019-03-21 DIAGNOSIS — Z00.00 ANNUAL PHYSICAL EXAM: ICD-10-CM

## 2019-03-21 DIAGNOSIS — M54.50 CHRONIC MIDLINE LOW BACK PAIN WITHOUT SCIATICA: Primary | ICD-10-CM

## 2019-03-21 DIAGNOSIS — G89.29 CHRONIC MIDLINE LOW BACK PAIN WITHOUT SCIATICA: Primary | ICD-10-CM

## 2019-03-21 DIAGNOSIS — R07.2 PRECORDIAL PAIN: ICD-10-CM

## 2019-03-21 PROCEDURE — 99999 PR PBB SHADOW E&M-EST. PATIENT-LVL IV: ICD-10-PCS | Mod: PBBFAC,,, | Performed by: INTERNAL MEDICINE

## 2019-03-21 PROCEDURE — 99396 PR PREVENTIVE VISIT,EST,40-64: ICD-10-PCS | Mod: S$GLB,,, | Performed by: INTERNAL MEDICINE

## 2019-03-21 PROCEDURE — 99999 PR PBB SHADOW E&M-EST. PATIENT-LVL IV: CPT | Mod: PBBFAC,,, | Performed by: INTERNAL MEDICINE

## 2019-03-21 PROCEDURE — 99396 PREV VISIT EST AGE 40-64: CPT | Mod: S$GLB,,, | Performed by: INTERNAL MEDICINE

## 2019-03-21 NOTE — PROGRESS NOTES
"Subjective:       Patient ID: Valentin Brito is a 58 y.o. male.    Chief Complaint: Annual Exam; Extremity Weakness (knees ); Low-back Pain; Tailbone Pain; and Shoulder Pain (L shoulder )    Here for annual exam    CP, intermittent, spontaneous, last minutes, occurs at rest. He denies PND, orthopnea, LE edema, palpitations.     Left hip pain, lateral, hurts to lie on that side at night and pain radiates down lateral portion of leg. No distal weakness, distal numbness, trauma. Takes occasional NSAID for this.     1 year Hx of sacral pain when seated. Reflects and feels there may have been an incident where he fell. Patient denies radiation of pain, numbness/tingling of lower ext, weakness of LE, saddle anesthesia, bowel/bladder incontinence, or F/C.     Statin-Pt is tolerating statin without frequent muscle cramps or diffuse myalgias or weakness.            Review of Systems    Objective:      Vitals:    03/21/19 1431   BP: 122/76   Pulse: 71   SpO2: 96%   Weight: 104.7 kg (230 lb 13.2 oz)   Height: 6' 4.5" (1.943 m)      Physical Exam    Assessment:       1. Chronic midline low back pain without sciatica    2. Precordial pain    3. Annual physical exam    4. Prostate cancer screening    5. Chest pain        Plan:       Valentin was seen today for annual exam, extremity weakness, low-back pain, tailbone pain and shoulder pain.    Diagnoses and all orders for this visit:    Chronic midline low back pain without sciatica  -     Ambulatory Referral to [facility]  -     X-Ray Sacrum And Coccyx; Future    Precordial pain  -     Cancel: Echocardiogram stress test (Cupid Only); Future  -     SCHEDULED EKG 12-LEAD (to Muse); Future  -     Echocardiogram stress test (Cupid Only); Future    Annual physical exam  -     Comprehensive metabolic panel; Future  -     Lipid panel; Future  -     TSH; Future  -     CBC auto differential; Future  -     Hemoglobin A1c; Future    Prostate cancer screening  -     PSA, Screening; " Future    Chest pain  -     SCHEDULED EKG 12-LEAD (to Muse); Future                 Side effects of medication(s) were discussed in detail and patient voiced understanding.  Patient will call back for any issues or complications.

## 2019-03-25 ENCOUNTER — HOSPITAL ENCOUNTER (OUTPATIENT)
Dept: RADIOLOGY | Facility: OTHER | Age: 59
Discharge: HOME | End: 2019-03-25
Attending: INTERNAL MEDICINE
Payer: COMMERCIAL

## 2019-03-25 ENCOUNTER — HOSPITAL ENCOUNTER (OUTPATIENT)
Dept: CARDIOLOGY | Facility: OTHER | Age: 59
Discharge: HOME | End: 2019-03-25
Attending: INTERNAL MEDICINE
Payer: COMMERCIAL

## 2019-03-25 DIAGNOSIS — G89.29 CHRONIC MIDLINE LOW BACK PAIN WITHOUT SCIATICA: ICD-10-CM

## 2019-03-25 DIAGNOSIS — M54.50 CHRONIC MIDLINE LOW BACK PAIN WITHOUT SCIATICA: ICD-10-CM

## 2019-03-25 DIAGNOSIS — R07.2 PRECORDIAL PAIN: ICD-10-CM

## 2019-03-25 PROCEDURE — 93010 ELECTROCARDIOGRAM REPORT: CPT | Mod: ,,, | Performed by: INTERNAL MEDICINE

## 2019-03-25 PROCEDURE — 72220 X-RAY EXAM SACRUM TAILBONE: CPT | Mod: TC,FY

## 2019-03-25 PROCEDURE — 72220 XR SACRUM AND COCCYX: ICD-10-PCS | Mod: 26,,, | Performed by: RADIOLOGY

## 2019-03-25 PROCEDURE — 72220 X-RAY EXAM SACRUM TAILBONE: CPT | Mod: 26,,, | Performed by: RADIOLOGY

## 2019-03-25 PROCEDURE — 93010 EKG 12-LEAD: ICD-10-PCS | Mod: ,,, | Performed by: INTERNAL MEDICINE

## 2019-03-25 PROCEDURE — 93005 ELECTROCARDIOGRAM TRACING: CPT

## 2019-03-29 ENCOUNTER — TELEPHONE (OUTPATIENT)
Dept: SPINE | Facility: CLINIC | Age: 59
End: 2019-03-29

## 2019-03-29 DIAGNOSIS — M54.5 LOW BACK PAIN, UNSPECIFIED BACK PAIN LATERALITY, UNSPECIFIED CHRONICITY, WITH SCIATICA PRESENCE UNSPECIFIED: Primary | ICD-10-CM

## 2020-02-18 DIAGNOSIS — E78.5 HYPERLIPIDEMIA, UNSPECIFIED HYPERLIPIDEMIA TYPE: ICD-10-CM

## 2020-02-18 RX ORDER — ATORVASTATIN CALCIUM 20 MG/1
TABLET, FILM COATED ORAL
Qty: 90 TABLET | Refills: 0 | Status: SHIPPED | OUTPATIENT
Start: 2020-02-18 | End: 2020-07-15

## 2020-06-23 ENCOUNTER — TELEPHONE (OUTPATIENT)
Dept: INTERNAL MEDICINE | Facility: CLINIC | Age: 60
End: 2020-06-23

## 2020-06-23 NOTE — TELEPHONE ENCOUNTER
----- Message from Silvestre Moreira sent at 6/23/2020  8:40 AM CDT -----  Name of Who is Calling:AJAY ESCOBAR [[id number]]      What is the request in detail: Patient would like a call back regarding a sooner appointment first available..... Please contact to further discuss and advise       Can the clinic reply by [facility]: no       What Number to Call Back [facility] not [facility]: [phone]

## 2020-06-23 NOTE — TELEPHONE ENCOUNTER
Attempted to call Benja Alisha to inform him that the earliest available appt for Dr. Mcnally is 07/28, but no answer.  LVM to call the office.

## 2020-06-24 ENCOUNTER — OFFICE VISIT (OUTPATIENT)
Dept: INTERNAL MEDICINE | Facility: CLINIC | Age: 60
End: 2020-06-24
Attending: INTERNAL MEDICINE
Payer: COMMERCIAL

## 2020-06-24 ENCOUNTER — HOSPITAL ENCOUNTER (OUTPATIENT)
Dept: RADIOLOGY | Facility: HOSPITAL | Age: 60
Discharge: HOME | End: 2020-06-24
Attending: INTERNAL MEDICINE
Payer: COMMERCIAL

## 2020-06-24 VITALS
SYSTOLIC BLOOD PRESSURE: 110 MMHG | DIASTOLIC BLOOD PRESSURE: 76 MMHG | BODY MASS INDEX: 26.6 KG/M2 | OXYGEN SATURATION: 98 % | HEIGHT: 77 IN | HEART RATE: 61 BPM | WEIGHT: 225.31 LBS

## 2020-06-24 DIAGNOSIS — Z01.84 ENCOUNTER FOR ANTIBODY RESPONSE EXAMINATION: ICD-10-CM

## 2020-06-24 DIAGNOSIS — K21.9 GASTROESOPHAGEAL REFLUX DISEASE, ESOPHAGITIS PRESENCE NOT SPECIFIED: ICD-10-CM

## 2020-06-24 DIAGNOSIS — R53.83 FATIGUE, UNSPECIFIED TYPE: ICD-10-CM

## 2020-06-24 DIAGNOSIS — Z00.00 ANNUAL PHYSICAL EXAM: Primary | ICD-10-CM

## 2020-06-24 DIAGNOSIS — R06.09 DYSPNEA ON EXERTION: ICD-10-CM

## 2020-06-24 DIAGNOSIS — R09.89 DECREASED PULSES IN FEET: ICD-10-CM

## 2020-06-24 DIAGNOSIS — R06.09 DOE (DYSPNEA ON EXERTION): ICD-10-CM

## 2020-06-24 DIAGNOSIS — R35.1 NOCTURIA: ICD-10-CM

## 2020-06-24 DIAGNOSIS — R07.9 CHEST PAIN, UNSPECIFIED TYPE: ICD-10-CM

## 2020-06-24 DIAGNOSIS — Z20.822 EXPOSURE TO COVID-19 VIRUS: ICD-10-CM

## 2020-06-24 DIAGNOSIS — Z12.11 ENCOUNTER FOR SCREENING COLONOSCOPY: ICD-10-CM

## 2020-06-24 PROCEDURE — 93922 US ANKLE/BRACH INDICES EXT LTD W/O STR 1-2 LEVELS: ICD-10-PCS | Mod: 26,,, | Performed by: RADIOLOGY

## 2020-06-24 PROCEDURE — 99999 PR PBB SHADOW E&M-EST. PATIENT-LVL V: ICD-10-PCS | Mod: PBBFAC,,, | Performed by: INTERNAL MEDICINE

## 2020-06-24 PROCEDURE — 93922 UPR/L XTREMITY ART 2 LEVELS: CPT | Mod: 26,,, | Performed by: RADIOLOGY

## 2020-06-24 PROCEDURE — 99396 PREV VISIT EST AGE 40-64: CPT | Mod: S$GLB,,, | Performed by: INTERNAL MEDICINE

## 2020-06-24 PROCEDURE — 99396 PR PREVENTIVE VISIT,EST,40-64: ICD-10-PCS | Mod: S$GLB,,, | Performed by: INTERNAL MEDICINE

## 2020-06-24 PROCEDURE — 93922 UPR/L XTREMITY ART 2 LEVELS: CPT | Mod: TC

## 2020-06-24 PROCEDURE — 99999 PR PBB SHADOW E&M-EST. PATIENT-LVL V: CPT | Mod: PBBFAC,,, | Performed by: INTERNAL MEDICINE

## 2020-06-24 NOTE — PROGRESS NOTES
"Subjective:       Patient ID: Valentin Brito is a 59 y.o. male.    Chief Complaint: Annual Exam, Fatigue, and Back Pain    Here for annual exam    Rash on leg for one year. + itchy; no change in size or symptoms     He notes SMITH when walking up steps in the evening. Discussion reveals this may be one of his more strenuous activities in the day. He ran up stairs this morning to hurry to appointment and he became dizzy and lightheaded and had to sit down.    Calf pain in calves when he walks, 4-5 times this month,    Heartburn has been bad for past 2-3 months during pandemic. He is due to follow up with Dr Bond at [facility] for EGD and colonoscopy.         Review of Systems   Constitutional: Positive for activity change and fatigue. Negative for appetite change, chills, fever and unexpected weight change.   HENT: Negative for hearing loss, sore throat and trouble swallowing.    Eyes: Negative for visual disturbance.   Respiratory: Positive for cough, chest tightness and shortness of breath.    Cardiovascular: Negative for chest pain and leg swelling.   Gastrointestinal: Negative for abdominal pain, blood in stool, constipation, diarrhea, nausea and vomiting.   Endocrine: Negative for polydipsia and polyuria.   Genitourinary: Negative for decreased urine volume, difficulty urinating, dysuria, frequency and urgency.   Musculoskeletal: Negative for gait problem.   Skin: Positive for rash.   Neurological: Negative for dizziness and numbness.   Psychiatric/Behavioral: The patient is not nervous/anxious.        Objective:      Vitals:    06/24/20 1047   BP: 110/76   Pulse: 61   SpO2: 98%   Weight: 102.2 kg (225 lb 5 oz)   Height: 6' 5" (1.956 m)      Physical Exam  Constitutional:       General: He is not in acute distress.     Appearance: He is well-developed.   HENT:      Head: Normocephalic and atraumatic.      Mouth/Throat:      Pharynx: No oropharyngeal exudate.   Eyes:      General: No scleral icterus.     " Conjunctiva/sclera: Conjunctivae normal.      Pupils: Pupils are equal, round, and reactive to light.   Neck:      Thyroid: No thyromegaly.   Cardiovascular:      Rate and Rhythm: Normal rate and regular rhythm.      Heart sounds: Normal heart sounds. No murmur.   Pulmonary:      Effort: Pulmonary effort is normal.      Breath sounds: Normal breath sounds. No wheezing or rales.   Abdominal:      General: There is no distension.      Palpations: Abdomen is soft.      Tenderness: There is no abdominal tenderness.   Musculoskeletal:         General: No tenderness.   Lymphadenopathy:      Cervical: No cervical adenopathy.   Skin:     General: Skin is warm and dry.          Neurological:      Mental Status: He is alert and oriented to person, place, and time.   Psychiatric:         Behavior: Behavior normal.         Assessment:       1. Annual physical exam    2. Fatigue, unspecified type    3. Exposure to Covid-19 Virus    4. Decreased pulses in feet    5. Gastroesophageal reflux disease, esophagitis presence not specified    6. SMITH (dyspnea on exertion)    7. Nocturia    8. Encounter for antibody response examination    9. Chest pain, unspecified type    10. Dyspnea on exertion        Plan:       Valentin was seen today for annual exam, fatigue and back pain.    Diagnoses and all orders for this visit:    Annual physical exam  -     Comprehensive metabolic panel; Future  -     Lipid Panel; Future  -     TSH; Future  -     CBC auto differential; Future  -     Hemoglobin A1C; Future    Fatigue, unspecified type  -     Testosterone; Future    Decreased pulses in feet  -     US Ankle/Brach Indices W/O Str 1-2 Levels; Future    Gastroesophageal reflux disease, esophagitis presence not specified  -     Ambulatory referral/consult to Gastroenterology; Future    SMITH (dyspnea on exertion)  -     Cancel: NM Myocardial Perfusion Spect Multi Exer; Future  -     Nuclear Stress Test; Future    Nocturia  -     PSA, Screening;  Future    Encounter for antibody response examination  -     COVID-19 (SARS CoV-2) IgG Antibody; Future    Chest pain, unspecified type  -     NM Myocardial Perfusion Spect Multi Pharmacologic; Future  -     Nuclear Stress Test; Future    Dyspnea on exertion  -     NM Myocardial Perfusion Spect Multi Pharmacologic; Future  -     Nuclear Stress Test; Future           Francisco Huynh MD  Internal Medicine-[facility]        Side effects of medication(s) were discussed in detail and patient voiced understanding.  Patient will call back for any issues or complications.

## 2020-06-26 ENCOUNTER — CLINICAL SUPPORT (OUTPATIENT)
Dept: CARDIOLOGY | Facility: CLINIC | Age: 60
End: 2020-06-26
Attending: INTERNAL MEDICINE
Payer: COMMERCIAL

## 2020-06-26 VITALS — HEIGHT: 77 IN | BODY MASS INDEX: 26.57 KG/M2 | WEIGHT: 225 LBS

## 2020-06-26 DIAGNOSIS — R07.9 CHEST PAIN, UNSPECIFIED TYPE: ICD-10-CM

## 2020-06-26 LAB
CV STRESS BASE HR: 62 BPM
DIASTOLIC BLOOD PRESSURE: 82 MMHG
END DIASTOLIC INDEX-HIGH: 170 ML/M2
END SYSTOLIC INDEX-HIGH: 70 ML/M2
NUC REST DIASTOLIC VOLUME INDEX: 134
NUC REST EJECTION FRACTION: 60
NUC REST SYSTOLIC VOLUME INDEX: 54
NUC STRESS DIASTOLIC VOLUME INDEX: 119
NUC STRESS EJECTION FRACTION: 68 %
NUC STRESS SYSTOLIC VOLUME INDEX: 39
OHS CV CPX 85 PERCENT MAX PREDICTED HEART RATE MALE: 138
OHS CV CPX MAX PREDICTED HEART RATE: 162
OHS CV CPX PATIENT IS FEMALE: 0
OHS CV CPX PATIENT IS MALE: 1
OHS CV CPX PEAK DIASTOLIC BLOOD PRESSURE: 76 MMHG
OHS CV CPX PEAK HEAR RATE: 133 BPM
OHS CV CPX PEAK RATE PRESSURE PRODUCT: NORMAL
OHS CV CPX PEAK SYSTOLIC BLOOD PRESSURE: 157 MMHG
OHS CV CPX PERCENT MAX PREDICTED HEART RATE ACHIEVED: 82
OHS CV CPX RATE PRESSURE PRODUCT PRESENTING: 7378
RETIRED EF AND QEF - SEE NOTES: 51 %
STRESS ECHO POST EXERCISE DUR MIN: 8 MINUTES
STRESS ECHO POST EXERCISE DUR SEC: 16 SECONDS
SYSTOLIC BLOOD PRESSURE: 119 MMHG

## 2020-06-26 PROCEDURE — 93015 CV STRESS TEST SUPVJ I&R: CPT | Mod: S$GLB,,, | Performed by: INTERNAL MEDICINE

## 2020-06-26 PROCEDURE — A9502 TC99M TETROFOSMIN: HCPCS | Mod: S$GLB,,, | Performed by: INTERNAL MEDICINE

## 2020-06-26 PROCEDURE — A9502 STRESS TEST WITH MYOCARDIAL PERFUSION (CUPID ONLY): ICD-10-PCS | Mod: S$GLB,,, | Performed by: INTERNAL MEDICINE

## 2020-06-26 PROCEDURE — 93015 STRESS TEST WITH MYOCARDIAL PERFUSION (CUPID ONLY): ICD-10-PCS | Mod: S$GLB,,, | Performed by: INTERNAL MEDICINE

## 2020-06-26 PROCEDURE — 78452 HT MUSCLE IMAGE SPECT MULT: CPT | Mod: S$GLB,,, | Performed by: INTERNAL MEDICINE

## 2020-06-26 PROCEDURE — 99999 PR PBB SHADOW E&M-EST. PATIENT-LVL I: ICD-10-PCS | Mod: PBBFAC,,,

## 2020-06-26 PROCEDURE — 99999 PR PBB SHADOW E&M-EST. PATIENT-LVL I: CPT | Mod: PBBFAC,,,

## 2020-06-26 PROCEDURE — 78452 STRESS TEST WITH MYOCARDIAL PERFUSION (CUPID ONLY): ICD-10-PCS | Mod: S$GLB,,, | Performed by: INTERNAL MEDICINE

## 2020-06-29 ENCOUNTER — IMMUNIZATION (OUTPATIENT)
Dept: PHARMACY | Facility: CLINIC | Age: 60
End: 2020-06-29
Payer: COMMERCIAL

## 2020-07-20 ENCOUNTER — TELEPHONE (OUTPATIENT)
Dept: ENDOSCOPY | Facility: HOSPITAL | Age: 60
End: 2020-07-20

## 2020-07-20 NOTE — TELEPHONE ENCOUNTER
Ambulatory referral for EGD/Colonoscopy received. Called patient to schedule procedures. Patient stated he is scheduled for procedures at an outside facility.

## 2020-08-31 ENCOUNTER — IMMUNIZATION (OUTPATIENT)
Dept: PHARMACY | Facility: CLINIC | Age: 60
End: 2020-08-31
Payer: COMMERCIAL

## 2020-11-06 ENCOUNTER — PATIENT OUTREACH (OUTPATIENT)
Dept: ADMINISTRATIVE | Facility: HOSPITAL | Age: 60
End: 2020-11-06

## 2020-11-19 ENCOUNTER — PATIENT OUTREACH (OUTPATIENT)
Dept: ADMINISTRATIVE | Facility: HOSPITAL | Age: 60
End: 2020-11-19

## 2021-01-25 ENCOUNTER — CLINICAL SUPPORT (OUTPATIENT)
Dept: URGENT CARE | Facility: CLINIC | Age: 61
End: 2021-01-25
Payer: COMMERCIAL

## 2021-01-25 DIAGNOSIS — Z20.822 ENCOUNTER FOR LABORATORY TESTING FOR COVID-19 VIRUS: ICD-10-CM

## 2021-01-25 PROCEDURE — U0003 INFECTIOUS AGENT DETECTION BY NUCLEIC ACID (DNA OR RNA); SEVERE ACUTE RESPIRATORY SYNDROME CORONAVIRUS 2 (SARS-COV-2) (CORONAVIRUS DISEASE [COVID-19]), AMPLIFIED PROBE TECHNIQUE, MAKING USE OF HIGH THROUGHPUT TECHNOLOGIES AS DESCRIBED BY CMS-2020-01-R: HCPCS

## 2021-01-26 LAB — SARS-COV-2 RNA RESP QL NAA+PROBE: NOT DETECTED

## 2021-02-26 ENCOUNTER — IMMUNIZATION (OUTPATIENT)
Dept: PHARMACY | Facility: CLINIC | Age: 61
End: 2021-02-26
Payer: COMMERCIAL

## 2021-02-26 DIAGNOSIS — Z23 NEED FOR VACCINATION: Primary | ICD-10-CM

## 2021-03-26 ENCOUNTER — IMMUNIZATION (OUTPATIENT)
Dept: PHARMACY | Facility: CLINIC | Age: 61
End: 2021-03-26
Payer: COMMERCIAL

## 2021-03-26 DIAGNOSIS — Z23 NEED FOR VACCINATION: Primary | ICD-10-CM

## 2021-03-30 ENCOUNTER — OFFICE VISIT (OUTPATIENT)
Dept: INTERNAL MEDICINE | Facility: CLINIC | Age: 61
End: 2021-03-30
Attending: INTERNAL MEDICINE
Payer: COMMERCIAL

## 2021-03-30 VITALS
WEIGHT: 229.25 LBS | BODY MASS INDEX: 27.07 KG/M2 | OXYGEN SATURATION: 96 % | SYSTOLIC BLOOD PRESSURE: 96 MMHG | HEART RATE: 72 BPM | DIASTOLIC BLOOD PRESSURE: 80 MMHG | HEIGHT: 77 IN

## 2021-03-30 DIAGNOSIS — M76.30 IT BAND SYNDROME, UNSPECIFIED LATERALITY: ICD-10-CM

## 2021-03-30 DIAGNOSIS — Z12.5 PROSTATE CANCER SCREENING: ICD-10-CM

## 2021-03-30 DIAGNOSIS — Z00.00 ANNUAL PHYSICAL EXAM: Primary | ICD-10-CM

## 2021-03-30 DIAGNOSIS — E78.5 HYPERLIPIDEMIA, UNSPECIFIED HYPERLIPIDEMIA TYPE: ICD-10-CM

## 2021-03-30 DIAGNOSIS — M54.50 CHRONIC BILATERAL LOW BACK PAIN WITHOUT SCIATICA: ICD-10-CM

## 2021-03-30 DIAGNOSIS — G89.29 CHRONIC BILATERAL LOW BACK PAIN WITHOUT SCIATICA: ICD-10-CM

## 2021-03-30 PROCEDURE — 99396 PR PREVENTIVE VISIT,EST,40-64: ICD-10-PCS | Mod: S$GLB,,, | Performed by: INTERNAL MEDICINE

## 2021-03-30 PROCEDURE — 3008F BODY MASS INDEX DOCD: CPT | Mod: CPTII,S$GLB,, | Performed by: INTERNAL MEDICINE

## 2021-03-30 PROCEDURE — 99396 PREV VISIT EST AGE 40-64: CPT | Mod: S$GLB,,, | Performed by: INTERNAL MEDICINE

## 2021-03-30 PROCEDURE — 1125F AMNT PAIN NOTED PAIN PRSNT: CPT | Mod: S$GLB,,, | Performed by: INTERNAL MEDICINE

## 2021-03-30 PROCEDURE — 1125F PR PAIN SEVERITY QUANTIFIED, PAIN PRESENT: ICD-10-PCS | Mod: S$GLB,,, | Performed by: INTERNAL MEDICINE

## 2021-03-30 PROCEDURE — 99999 PR PBB SHADOW E&M-EST. PATIENT-LVL III: CPT | Mod: PBBFAC,,, | Performed by: INTERNAL MEDICINE

## 2021-03-30 PROCEDURE — 3008F PR BODY MASS INDEX (BMI) DOCUMENTED: ICD-10-PCS | Mod: CPTII,S$GLB,, | Performed by: INTERNAL MEDICINE

## 2021-03-30 PROCEDURE — 99999 PR PBB SHADOW E&M-EST. PATIENT-LVL III: ICD-10-PCS | Mod: PBBFAC,,, | Performed by: INTERNAL MEDICINE

## 2021-03-31 ENCOUNTER — OFFICE VISIT (OUTPATIENT)
Dept: CARDIOLOGY | Facility: CLINIC | Age: 61
End: 2021-03-31
Payer: COMMERCIAL

## 2021-03-31 ENCOUNTER — TELEPHONE (OUTPATIENT)
Dept: CARDIOLOGY | Facility: CLINIC | Age: 61
End: 2021-03-31

## 2021-03-31 VITALS
BODY MASS INDEX: 27.05 KG/M2 | DIASTOLIC BLOOD PRESSURE: 80 MMHG | OXYGEN SATURATION: 97 % | HEIGHT: 77 IN | SYSTOLIC BLOOD PRESSURE: 115 MMHG | WEIGHT: 229.06 LBS | HEART RATE: 61 BPM

## 2021-03-31 DIAGNOSIS — N52.8 OTHER MALE ERECTILE DYSFUNCTION: ICD-10-CM

## 2021-03-31 DIAGNOSIS — I47.10 SVT (SUPRAVENTRICULAR TACHYCARDIA): Primary | ICD-10-CM

## 2021-03-31 DIAGNOSIS — R07.9 CHEST PAIN, UNSPECIFIED TYPE: Primary | ICD-10-CM

## 2021-03-31 PROCEDURE — 3008F BODY MASS INDEX DOCD: CPT | Mod: CPTII,S$GLB,, | Performed by: INTERNAL MEDICINE

## 2021-03-31 PROCEDURE — 1126F PR PAIN SEVERITY QUANTIFIED, NO PAIN PRESENT: ICD-10-PCS | Mod: S$GLB,,, | Performed by: INTERNAL MEDICINE

## 2021-03-31 PROCEDURE — 99999 PR PBB SHADOW E&M-EST. PATIENT-LVL IV: ICD-10-PCS | Mod: PBBFAC,,, | Performed by: INTERNAL MEDICINE

## 2021-03-31 PROCEDURE — 99204 PR OFFICE/OUTPT VISIT, NEW, LEVL IV, 45-59 MIN: ICD-10-PCS | Mod: S$GLB,,, | Performed by: INTERNAL MEDICINE

## 2021-03-31 PROCEDURE — 99999 PR PBB SHADOW E&M-EST. PATIENT-LVL IV: CPT | Mod: PBBFAC,,, | Performed by: INTERNAL MEDICINE

## 2021-03-31 PROCEDURE — 3008F PR BODY MASS INDEX (BMI) DOCUMENTED: ICD-10-PCS | Mod: CPTII,S$GLB,, | Performed by: INTERNAL MEDICINE

## 2021-03-31 PROCEDURE — 99204 OFFICE O/P NEW MOD 45 MIN: CPT | Mod: S$GLB,,, | Performed by: INTERNAL MEDICINE

## 2021-03-31 PROCEDURE — 1126F AMNT PAIN NOTED NONE PRSNT: CPT | Mod: S$GLB,,, | Performed by: INTERNAL MEDICINE

## 2021-03-31 RX ORDER — METOPROLOL TARTRATE 25 MG/1
25 TABLET, FILM COATED ORAL DAILY PRN
Qty: 60 TABLET | Refills: 11 | Status: SHIPPED | OUTPATIENT
Start: 2021-03-31 | End: 2021-11-10

## 2021-03-31 RX ORDER — TADALAFIL 10 MG/1
10 TABLET ORAL DAILY PRN
Qty: 30 TABLET | Refills: 11 | Status: SHIPPED | OUTPATIENT
Start: 2021-03-31 | End: 2021-11-10 | Stop reason: SDUPTHER

## 2021-04-05 PROBLEM — E78.5 HYPERLIPIDEMIA: Status: ACTIVE | Noted: 2021-04-05

## 2021-04-05 PROBLEM — M76.30 IT BAND SYNDROME, UNSPECIFIED LATERALITY: Status: ACTIVE | Noted: 2021-04-05

## 2021-04-05 PROBLEM — G89.29 CHRONIC BILATERAL LOW BACK PAIN WITHOUT SCIATICA: Status: ACTIVE | Noted: 2021-04-05

## 2021-04-05 PROBLEM — M54.50 CHRONIC BILATERAL LOW BACK PAIN WITHOUT SCIATICA: Status: ACTIVE | Noted: 2021-04-05

## 2021-04-07 ENCOUNTER — LAB VISIT (OUTPATIENT)
Dept: LAB | Facility: HOSPITAL | Age: 61
End: 2021-04-07
Attending: INTERNAL MEDICINE
Payer: COMMERCIAL

## 2021-04-07 DIAGNOSIS — Z12.5 PROSTATE CANCER SCREENING: ICD-10-CM

## 2021-04-07 DIAGNOSIS — Z00.00 ANNUAL PHYSICAL EXAM: ICD-10-CM

## 2021-04-07 LAB
BASOPHILS # BLD AUTO: 0.04 K/UL (ref 0–0.2)
BASOPHILS NFR BLD: 0.5 % (ref 0–1.9)
DIFFERENTIAL METHOD: NORMAL
EOSINOPHIL # BLD AUTO: 0.1 K/UL (ref 0–0.5)
EOSINOPHIL NFR BLD: 1.3 % (ref 0–8)
ERYTHROCYTE [DISTWIDTH] IN BLOOD BY AUTOMATED COUNT: 12.9 % (ref 11.5–14.5)
HCT VFR BLD AUTO: 46.4 % (ref 40–54)
HGB BLD-MCNC: 15.2 G/DL (ref 14–18)
IMM GRANULOCYTES # BLD AUTO: 0.03 K/UL (ref 0–0.04)
IMM GRANULOCYTES NFR BLD AUTO: 0.4 % (ref 0–0.5)
LYMPHOCYTES # BLD AUTO: 2.5 K/UL (ref 1–4.8)
LYMPHOCYTES NFR BLD: 31 % (ref 18–48)
MCH RBC QN AUTO: 29.3 PG (ref 27–31)
MCHC RBC AUTO-ENTMCNC: 32.8 G/DL (ref 32–36)
MCV RBC AUTO: 90 FL (ref 82–98)
MONOCYTES # BLD AUTO: 0.7 K/UL (ref 0.3–1)
MONOCYTES NFR BLD: 8.7 % (ref 4–15)
NEUTROPHILS # BLD AUTO: 4.6 K/UL (ref 1.8–7.7)
NEUTROPHILS NFR BLD: 58.1 % (ref 38–73)
NRBC BLD-RTO: 0 /100 WBC
PLATELET # BLD AUTO: 228 K/UL (ref 150–450)
PMV BLD AUTO: 11.8 FL (ref 9.2–12.9)
RBC # BLD AUTO: 5.18 M/UL (ref 4.6–6.2)
WBC # BLD AUTO: 7.91 K/UL (ref 3.9–12.7)

## 2021-04-07 PROCEDURE — 80061 LIPID PANEL: CPT | Performed by: INTERNAL MEDICINE

## 2021-04-07 PROCEDURE — 85025 COMPLETE CBC W/AUTO DIFF WBC: CPT | Performed by: INTERNAL MEDICINE

## 2021-04-07 PROCEDURE — 84443 ASSAY THYROID STIM HORMONE: CPT | Performed by: INTERNAL MEDICINE

## 2021-04-07 PROCEDURE — 83036 HEMOGLOBIN GLYCOSYLATED A1C: CPT | Performed by: INTERNAL MEDICINE

## 2021-04-07 PROCEDURE — 80053 COMPREHEN METABOLIC PANEL: CPT | Performed by: INTERNAL MEDICINE

## 2021-04-07 PROCEDURE — 36415 COLL VENOUS BLD VENIPUNCTURE: CPT | Mod: PO | Performed by: INTERNAL MEDICINE

## 2021-04-07 PROCEDURE — 84439 ASSAY OF FREE THYROXINE: CPT | Performed by: INTERNAL MEDICINE

## 2021-04-07 PROCEDURE — 84153 ASSAY OF PSA TOTAL: CPT | Performed by: INTERNAL MEDICINE

## 2021-04-08 LAB
ALBUMIN SERPL BCP-MCNC: 4.2 G/DL (ref 3.5–5.2)
ALP SERPL-CCNC: 68 U/L (ref 55–135)
ALT SERPL W/O P-5'-P-CCNC: 17 U/L (ref 10–44)
ANION GAP SERPL CALC-SCNC: 9 MMOL/L (ref 8–16)
AST SERPL-CCNC: 17 U/L (ref 10–40)
BILIRUB SERPL-MCNC: 0.5 MG/DL (ref 0.1–1)
BUN SERPL-MCNC: 18 MG/DL (ref 6–20)
CALCIUM SERPL-MCNC: 9.2 MG/DL (ref 8.7–10.5)
CHLORIDE SERPL-SCNC: 106 MMOL/L (ref 95–110)
CHOLEST SERPL-MCNC: 159 MG/DL (ref 120–199)
CHOLEST/HDLC SERPL: 2.9 {RATIO} (ref 2–5)
CO2 SERPL-SCNC: 24 MMOL/L (ref 23–29)
COMPLEXED PSA SERPL-MCNC: 0.58 NG/ML (ref 0–4)
CREAT SERPL-MCNC: 0.9 MG/DL (ref 0.5–1.4)
EST. GFR  (AFRICAN AMERICAN): >60 ML/MIN/1.73 M^2
EST. GFR  (NON AFRICAN AMERICAN): >60 ML/MIN/1.73 M^2
ESTIMATED AVG GLUCOSE: 111 MG/DL (ref 68–131)
GLUCOSE SERPL-MCNC: 86 MG/DL (ref 70–110)
HBA1C MFR BLD: 5.5 % (ref 4–5.6)
HDLC SERPL-MCNC: 55 MG/DL (ref 40–75)
HDLC SERPL: 34.6 % (ref 20–50)
LDLC SERPL CALC-MCNC: 85.6 MG/DL (ref 63–159)
NONHDLC SERPL-MCNC: 104 MG/DL
POTASSIUM SERPL-SCNC: 4.7 MMOL/L (ref 3.5–5.1)
PROT SERPL-MCNC: 7.6 G/DL (ref 6–8.4)
SODIUM SERPL-SCNC: 139 MMOL/L (ref 136–145)
T4 FREE SERPL-MCNC: 0.93 NG/DL (ref 0.71–1.51)
TRIGL SERPL-MCNC: 92 MG/DL (ref 30–150)
TSH SERPL DL<=0.005 MIU/L-ACNC: 0.37 UIU/ML (ref 0.4–4)

## 2021-04-12 ENCOUNTER — OFFICE VISIT (OUTPATIENT)
Dept: SLEEP MEDICINE | Facility: CLINIC | Age: 61
End: 2021-04-12
Payer: COMMERCIAL

## 2021-04-12 VITALS
BODY MASS INDEX: 27.05 KG/M2 | HEIGHT: 77 IN | DIASTOLIC BLOOD PRESSURE: 78 MMHG | SYSTOLIC BLOOD PRESSURE: 120 MMHG | HEART RATE: 57 BPM | WEIGHT: 229.06 LBS

## 2021-04-12 DIAGNOSIS — K21.9 GASTROESOPHAGEAL REFLUX DISEASE, UNSPECIFIED WHETHER ESOPHAGITIS PRESENT: ICD-10-CM

## 2021-04-12 DIAGNOSIS — I47.10 SVT (SUPRAVENTRICULAR TACHYCARDIA): ICD-10-CM

## 2021-04-12 DIAGNOSIS — R06.83 SNORING: Primary | ICD-10-CM

## 2021-04-12 PROCEDURE — 99202 PR OFFICE/OUTPT VISIT, NEW, LEVL II, 15-29 MIN: ICD-10-PCS | Mod: S$GLB,,, | Performed by: INTERNAL MEDICINE

## 2021-04-12 PROCEDURE — 1126F PR PAIN SEVERITY QUANTIFIED, NO PAIN PRESENT: ICD-10-PCS | Mod: S$GLB,,, | Performed by: INTERNAL MEDICINE

## 2021-04-12 PROCEDURE — 1126F AMNT PAIN NOTED NONE PRSNT: CPT | Mod: S$GLB,,, | Performed by: INTERNAL MEDICINE

## 2021-04-12 PROCEDURE — 3008F BODY MASS INDEX DOCD: CPT | Mod: CPTII,S$GLB,, | Performed by: INTERNAL MEDICINE

## 2021-04-12 PROCEDURE — 99202 OFFICE O/P NEW SF 15 MIN: CPT | Mod: S$GLB,,, | Performed by: INTERNAL MEDICINE

## 2021-04-12 PROCEDURE — 3008F PR BODY MASS INDEX (BMI) DOCUMENTED: ICD-10-PCS | Mod: CPTII,S$GLB,, | Performed by: INTERNAL MEDICINE

## 2021-04-12 PROCEDURE — 99999 PR PBB SHADOW E&M-EST. PATIENT-LVL III: CPT | Mod: PBBFAC,,, | Performed by: INTERNAL MEDICINE

## 2021-04-12 PROCEDURE — 99999 PR PBB SHADOW E&M-EST. PATIENT-LVL III: ICD-10-PCS | Mod: PBBFAC,,, | Performed by: INTERNAL MEDICINE

## 2021-04-15 ENCOUNTER — HOSPITAL ENCOUNTER (OUTPATIENT)
Dept: CARDIOLOGY | Facility: HOSPITAL | Age: 61
Discharge: HOME | End: 2021-04-15
Attending: INTERNAL MEDICINE
Payer: COMMERCIAL

## 2021-04-15 VITALS
HEART RATE: 58 BPM | HEIGHT: 77 IN | DIASTOLIC BLOOD PRESSURE: 80 MMHG | BODY MASS INDEX: 27.04 KG/M2 | SYSTOLIC BLOOD PRESSURE: 120 MMHG | WEIGHT: 229 LBS

## 2021-04-15 DIAGNOSIS — I47.10 SVT (SUPRAVENTRICULAR TACHYCARDIA): ICD-10-CM

## 2021-04-15 LAB
ASCENDING AORTA: 3.6 CM
AV INDEX (PROSTH): 0.74
AV MEAN GRADIENT: 4 MMHG
AV PEAK GRADIENT: 8 MMHG
AV VALVE AREA: 2.76 CM2
AV VELOCITY RATIO: 0.81
BSA FOR ECHO PROCEDURE: 2.38 M2
CV ECHO LV RWT: 0.3 CM
DOP CALC AO PEAK VEL: 1.39 M/S
DOP CALC AO VTI: 30.4 CM
DOP CALC LVOT AREA: 3.7 CM2
DOP CALC LVOT DIAMETER: 2.18 CM
DOP CALC LVOT PEAK VEL: 1.13 M/S
DOP CALC LVOT STROKE VOLUME: 83.75 CM3
DOP CALC RVOT PEAK VEL: 0.77 M/S
DOP CALC RVOT VTI: 17.58 CM
DOP CALCLVOT PEAK VEL VTI: 22.45 CM
E WAVE DECELERATION TIME: 134.82 MSEC
E/A RATIO: 1.36
E/E' RATIO: 8.53 M/S
ECHO LV POSTERIOR WALL: 0.81 CM (ref 0.6–1.1)
EJECTION FRACTION: 60 %
FRACTIONAL SHORTENING: 41 % (ref 28–44)
INTERVENTRICULAR SEPTUM: 0.82 CM (ref 0.6–1.1)
IVRT: 91.34 MSEC
LA MAJOR: 5.18 CM
LA MINOR: 5.04 CM
LA WIDTH: 3.7 CM
LEFT ATRIUM SIZE: 4.28 CM
LEFT ATRIUM VOLUME INDEX MOD: 22.9 ML/M2
LEFT ATRIUM VOLUME INDEX: 29 ML/M2
LEFT ATRIUM VOLUME MOD: 54.36 CM3
LEFT ATRIUM VOLUME: 68.77 CM3
LEFT INTERNAL DIMENSION IN SYSTOLE: 3.23 CM (ref 2.1–4)
LEFT VENTRICLE DIASTOLIC VOLUME INDEX: 61.14 ML/M2
LEFT VENTRICLE DIASTOLIC VOLUME: 144.91 ML
LEFT VENTRICLE MASS INDEX: 68 G/M2
LEFT VENTRICLE SYSTOLIC VOLUME INDEX: 17.7 ML/M2
LEFT VENTRICLE SYSTOLIC VOLUME: 41.93 ML
LEFT VENTRICULAR INTERNAL DIMENSION IN DIASTOLE: 5.46 CM (ref 3.5–6)
LEFT VENTRICULAR MASS: 161.7 G
LV LATERAL E/E' RATIO: 7.11 M/S
LV SEPTAL E/E' RATIO: 10.67 M/S
MV A" WAVE DURATION": 13.99 MSEC
MV PEAK A VEL: 0.47 M/S
MV PEAK E VEL: 0.64 M/S
MV STENOSIS PRESSURE HALF TIME: 39.1 MS
MV VALVE AREA P 1/2 METHOD: 5.63 CM2
PISA TR MAX VEL: 2.4 M/S
PULM VEIN S/D RATIO: 0.92
PV MEAN GRADIENT: 2 MMHG
PV PEAK D VEL: 0.62 M/S
PV PEAK S VEL: 0.57 M/S
PV PEAK VELOCITY: 0.95 CM/S
RA MAJOR: 5.12 CM
RA PRESSURE: 3 MMHG
RA WIDTH: 3.78 CM
RIGHT VENTRICULAR END-DIASTOLIC DIMENSION: 2.69 CM
SINUS: 3.8 CM
STJ: 3.5 CM
TDI LATERAL: 0.09 M/S
TDI SEPTAL: 0.06 M/S
TDI: 0.08 M/S
TR MAX PG: 23 MMHG
TRICUSPID ANNULAR PLANE SYSTOLIC EXCURSION: 2.82 CM
TV REST PULMONARY ARTERY PRESSURE: 26 MMHG

## 2021-04-15 PROCEDURE — 93306 TTE W/DOPPLER COMPLETE: CPT

## 2021-04-15 PROCEDURE — 93306 ECHO (CUPID ONLY): ICD-10-PCS | Mod: 26,,, | Performed by: INTERNAL MEDICINE

## 2021-04-15 PROCEDURE — 93306 TTE W/DOPPLER COMPLETE: CPT | Mod: 26,,, | Performed by: INTERNAL MEDICINE

## 2021-04-20 ENCOUNTER — TELEPHONE (OUTPATIENT)
Dept: SLEEP MEDICINE | Facility: OTHER | Age: 61
End: 2021-04-20

## 2021-05-25 ENCOUNTER — TELEPHONE (OUTPATIENT)
Dept: SLEEP MEDICINE | Facility: OTHER | Age: 61
End: 2021-05-25

## 2021-06-09 ENCOUNTER — TELEPHONE (OUTPATIENT)
Dept: SLEEP MEDICINE | Facility: OTHER | Age: 61
End: 2021-06-09

## 2021-06-15 ENCOUNTER — TELEPHONE (OUTPATIENT)
Dept: SLEEP MEDICINE | Facility: OTHER | Age: 61
End: 2021-06-15

## 2021-06-24 ENCOUNTER — TELEPHONE (OUTPATIENT)
Dept: SLEEP MEDICINE | Facility: OTHER | Age: 61
End: 2021-06-24

## 2021-07-03 ENCOUNTER — PATIENT MESSAGE (OUTPATIENT)
Dept: INTERNAL MEDICINE | Facility: CLINIC | Age: 61
End: 2021-07-03

## 2021-11-08 ENCOUNTER — PATIENT MESSAGE (OUTPATIENT)
Dept: INTERNAL MEDICINE | Facility: CLINIC | Age: 61
End: 2021-11-08
Payer: COMMERCIAL

## 2021-11-08 DIAGNOSIS — M54.50 LOW BACK PAIN, UNSPECIFIED BACK PAIN LATERALITY, UNSPECIFIED CHRONICITY, UNSPECIFIED WHETHER SCIATICA PRESENT: Primary | ICD-10-CM

## 2021-11-10 ENCOUNTER — OFFICE VISIT (OUTPATIENT)
Dept: INTERNAL MEDICINE | Facility: CLINIC | Age: 61
End: 2021-11-10
Payer: COMMERCIAL

## 2021-11-10 VITALS
OXYGEN SATURATION: 97 % | DIASTOLIC BLOOD PRESSURE: 70 MMHG | BODY MASS INDEX: 26.62 KG/M2 | SYSTOLIC BLOOD PRESSURE: 118 MMHG | HEIGHT: 77 IN | WEIGHT: 225.5 LBS | HEART RATE: 58 BPM

## 2021-11-10 DIAGNOSIS — G89.29 CHRONIC BILATERAL LOW BACK PAIN WITHOUT SCIATICA: Primary | ICD-10-CM

## 2021-11-10 DIAGNOSIS — M54.50 CHRONIC BILATERAL LOW BACK PAIN WITHOUT SCIATICA: Primary | ICD-10-CM

## 2021-11-10 DIAGNOSIS — N52.8 OTHER MALE ERECTILE DYSFUNCTION: ICD-10-CM

## 2021-11-10 PROCEDURE — 3078F DIAST BP <80 MM HG: CPT | Mod: CPTII,S$GLB,, | Performed by: STUDENT IN AN ORGANIZED HEALTH CARE EDUCATION/TRAINING PROGRAM

## 2021-11-10 PROCEDURE — 3074F SYST BP LT 130 MM HG: CPT | Mod: CPTII,S$GLB,, | Performed by: STUDENT IN AN ORGANIZED HEALTH CARE EDUCATION/TRAINING PROGRAM

## 2021-11-10 PROCEDURE — 99999 PR PBB SHADOW E&M-EST. PATIENT-LVL III: ICD-10-PCS | Mod: PBBFAC,,, | Performed by: STUDENT IN AN ORGANIZED HEALTH CARE EDUCATION/TRAINING PROGRAM

## 2021-11-10 PROCEDURE — 99213 PR OFFICE/OUTPT VISIT, EST, LEVL III, 20-29 MIN: ICD-10-PCS | Mod: S$GLB,,, | Performed by: STUDENT IN AN ORGANIZED HEALTH CARE EDUCATION/TRAINING PROGRAM

## 2021-11-10 PROCEDURE — 1160F RVW MEDS BY RX/DR IN RCRD: CPT | Mod: CPTII,S$GLB,, | Performed by: STUDENT IN AN ORGANIZED HEALTH CARE EDUCATION/TRAINING PROGRAM

## 2021-11-10 PROCEDURE — 3044F HG A1C LEVEL LT 7.0%: CPT | Mod: CPTII,S$GLB,, | Performed by: STUDENT IN AN ORGANIZED HEALTH CARE EDUCATION/TRAINING PROGRAM

## 2021-11-10 PROCEDURE — 1159F PR MEDICATION LIST DOCUMENTED IN MEDICAL RECORD: ICD-10-PCS | Mod: CPTII,S$GLB,, | Performed by: STUDENT IN AN ORGANIZED HEALTH CARE EDUCATION/TRAINING PROGRAM

## 2021-11-10 PROCEDURE — 3008F PR BODY MASS INDEX (BMI) DOCUMENTED: ICD-10-PCS | Mod: CPTII,S$GLB,, | Performed by: STUDENT IN AN ORGANIZED HEALTH CARE EDUCATION/TRAINING PROGRAM

## 2021-11-10 PROCEDURE — 3008F BODY MASS INDEX DOCD: CPT | Mod: CPTII,S$GLB,, | Performed by: STUDENT IN AN ORGANIZED HEALTH CARE EDUCATION/TRAINING PROGRAM

## 2021-11-10 PROCEDURE — 3044F PR MOST RECENT HEMOGLOBIN A1C LEVEL <7.0%: ICD-10-PCS | Mod: CPTII,S$GLB,, | Performed by: STUDENT IN AN ORGANIZED HEALTH CARE EDUCATION/TRAINING PROGRAM

## 2021-11-10 PROCEDURE — 1160F PR REVIEW ALL MEDS BY PRESCRIBER/CLIN PHARMACIST DOCUMENTED: ICD-10-PCS | Mod: CPTII,S$GLB,, | Performed by: STUDENT IN AN ORGANIZED HEALTH CARE EDUCATION/TRAINING PROGRAM

## 2021-11-10 PROCEDURE — 3078F PR MOST RECENT DIASTOLIC BLOOD PRESSURE < 80 MM HG: ICD-10-PCS | Mod: CPTII,S$GLB,, | Performed by: STUDENT IN AN ORGANIZED HEALTH CARE EDUCATION/TRAINING PROGRAM

## 2021-11-10 PROCEDURE — 3074F PR MOST RECENT SYSTOLIC BLOOD PRESSURE < 130 MM HG: ICD-10-PCS | Mod: CPTII,S$GLB,, | Performed by: STUDENT IN AN ORGANIZED HEALTH CARE EDUCATION/TRAINING PROGRAM

## 2021-11-10 PROCEDURE — 1159F MED LIST DOCD IN RCRD: CPT | Mod: CPTII,S$GLB,, | Performed by: STUDENT IN AN ORGANIZED HEALTH CARE EDUCATION/TRAINING PROGRAM

## 2021-11-10 PROCEDURE — 99999 PR PBB SHADOW E&M-EST. PATIENT-LVL III: CPT | Mod: PBBFAC,,, | Performed by: STUDENT IN AN ORGANIZED HEALTH CARE EDUCATION/TRAINING PROGRAM

## 2021-11-10 PROCEDURE — 99213 OFFICE O/P EST LOW 20 MIN: CPT | Mod: S$GLB,,, | Performed by: STUDENT IN AN ORGANIZED HEALTH CARE EDUCATION/TRAINING PROGRAM

## 2021-11-10 RX ORDER — METHOCARBAMOL 750 MG/1
750 TABLET, FILM COATED ORAL 3 TIMES DAILY
Qty: 30 TABLET | Refills: 0 | Status: SHIPPED | OUTPATIENT
Start: 2021-11-10 | End: 2021-11-20

## 2021-11-10 RX ORDER — FAMOTIDINE 10 MG/1
10 TABLET ORAL 2 TIMES DAILY PRN
COMMUNITY
End: 2022-08-18 | Stop reason: SDUPTHER

## 2021-11-10 RX ORDER — TADALAFIL 10 MG/1
10 TABLET ORAL DAILY
Qty: 90 TABLET | Refills: 3 | Status: SHIPPED | OUTPATIENT
Start: 2021-11-10 | End: 2022-08-18 | Stop reason: SDUPTHER

## 2021-12-16 ENCOUNTER — CLINICAL SUPPORT (OUTPATIENT)
Dept: REHABILITATION | Facility: OTHER | Age: 61
End: 2021-12-16
Attending: INTERNAL MEDICINE
Payer: COMMERCIAL

## 2021-12-16 DIAGNOSIS — M54.50 LOW BACK PAIN, UNSPECIFIED BACK PAIN LATERALITY, UNSPECIFIED CHRONICITY, UNSPECIFIED WHETHER SCIATICA PRESENT: ICD-10-CM

## 2021-12-16 DIAGNOSIS — R29.898 DECREASED STRENGTH OF TRUNK AND BACK: ICD-10-CM

## 2021-12-16 DIAGNOSIS — R29.898 WEAKNESS OF BOTH LOWER EXTREMITIES: ICD-10-CM

## 2021-12-16 DIAGNOSIS — R29.3 POOR POSTURE: ICD-10-CM

## 2021-12-16 DIAGNOSIS — M25.69 DECREASED ROM OF TRUNK AND BACK: ICD-10-CM

## 2021-12-16 PROCEDURE — 97161 PT EVAL LOW COMPLEX 20 MIN: CPT

## 2021-12-16 PROCEDURE — 97110 THERAPEUTIC EXERCISES: CPT

## 2021-12-17 PROBLEM — R29.3 POOR POSTURE: Status: ACTIVE | Noted: 2021-12-17

## 2021-12-17 PROBLEM — M25.69 DECREASED ROM OF TRUNK AND BACK: Status: ACTIVE | Noted: 2021-12-17

## 2021-12-17 PROBLEM — R29.898 DECREASED STRENGTH OF TRUNK AND BACK: Status: ACTIVE | Noted: 2021-12-17

## 2021-12-17 PROBLEM — R29.898 WEAKNESS OF BOTH LOWER EXTREMITIES: Status: ACTIVE | Noted: 2021-12-17

## 2021-12-21 ENCOUNTER — CLINICAL SUPPORT (OUTPATIENT)
Dept: REHABILITATION | Facility: OTHER | Age: 61
End: 2021-12-21
Attending: INTERNAL MEDICINE
Payer: COMMERCIAL

## 2021-12-21 DIAGNOSIS — R29.898 WEAKNESS OF BOTH LOWER EXTREMITIES: ICD-10-CM

## 2021-12-21 DIAGNOSIS — R29.3 POOR POSTURE: ICD-10-CM

## 2021-12-21 DIAGNOSIS — M25.69 DECREASED ROM OF TRUNK AND BACK: ICD-10-CM

## 2021-12-21 DIAGNOSIS — R29.898 DECREASED STRENGTH OF TRUNK AND BACK: Primary | ICD-10-CM

## 2021-12-21 PROCEDURE — 97110 THERAPEUTIC EXERCISES: CPT | Mod: CQ

## 2021-12-28 ENCOUNTER — CLINICAL SUPPORT (OUTPATIENT)
Dept: REHABILITATION | Facility: OTHER | Age: 61
End: 2021-12-28
Attending: INTERNAL MEDICINE
Payer: COMMERCIAL

## 2021-12-28 DIAGNOSIS — M25.69 DECREASED ROM OF TRUNK AND BACK: ICD-10-CM

## 2021-12-28 DIAGNOSIS — R29.898 DECREASED STRENGTH OF TRUNK AND BACK: Primary | ICD-10-CM

## 2021-12-28 DIAGNOSIS — R29.3 POOR POSTURE: ICD-10-CM

## 2021-12-28 DIAGNOSIS — R29.898 WEAKNESS OF BOTH LOWER EXTREMITIES: ICD-10-CM

## 2021-12-28 PROCEDURE — 97110 THERAPEUTIC EXERCISES: CPT | Mod: CQ

## 2022-01-11 ENCOUNTER — CLINICAL SUPPORT (OUTPATIENT)
Dept: REHABILITATION | Facility: OTHER | Age: 62
End: 2022-01-11
Attending: INTERNAL MEDICINE
Payer: COMMERCIAL

## 2022-01-11 DIAGNOSIS — R29.898 WEAKNESS OF BOTH LOWER EXTREMITIES: ICD-10-CM

## 2022-01-11 DIAGNOSIS — M25.69 DECREASED ROM OF TRUNK AND BACK: ICD-10-CM

## 2022-01-11 DIAGNOSIS — R29.898 DECREASED STRENGTH OF TRUNK AND BACK: Primary | ICD-10-CM

## 2022-01-11 DIAGNOSIS — R29.3 POOR POSTURE: ICD-10-CM

## 2022-01-11 PROCEDURE — 97110 THERAPEUTIC EXERCISES: CPT

## 2022-01-11 PROCEDURE — 97530 THERAPEUTIC ACTIVITIES: CPT

## 2022-01-11 NOTE — PROGRESS NOTES
"[facility]   Physical Therapy Treatment Note/Reassessment      Name: Valentin Brito  Clinic Number: [id number]    Therapy Diagnosis:   Encounter Diagnoses   Name Primary?    Decreased strength of trunk and back Yes    Poor posture     Decreased ROM of trunk and back     Weakness of both lower extremities      Physician: Francisco Mcnally MD    Visit Date: 1/11/2022     Physician Orders: PT Eval and Treat   Medical Diagnosis from Referral: M54.50 (ICD-10-CM) - Low back pain, unspecified back pain laterality, unspecified chronicity, unspecified whether sciatica present  Evaluation Date: 12/16/2021  Authorization Period Expiration: 11/9/2022  Plan of Care Expiration: 2/16/2022  Visit # / Visits authorized: 4/8    Progress Note Due: 2/11/2022  FOTO: 1/ 1    FOTO NEXT VISIT     Precautions: Standard     Time In: 3:45 pm  Time Out: 4:45 pm  Total Appointment Time (timed & untimed codes): 55 minutes      SUBJECTIVE     Pt reports: he pulled his back a few days ago and it has lingered into today. It has gotten better each day, but it is still there    He was compliant with home exercise program.  Response to previous treatment: Decreased pain  Functional change:Less pain with activities    Pain: 3/10  Location:     Social History: lives with their spouse, multistory home  Occupation: construction    Pts goals: "to make me feel better"    OBJECTIVE     Objective Measures updated at progress report unless specified.     Lumbar Range of Motion: - reassessed 1/11/2022    %   Flexion 90      Extension 90      Left Side Bending * 80   Right Side Bending * 80   Left rotation    90   Right Rotation *    90    *= pain        Lower Extremity Strength  - reassessed 1/11/2022     Right LE   Left LE     Hip flexion: 5/5 Hip flexion: 5/5   Knee extension: 5/5 Knee extension: 5/5   Knee flexion: 5/5 Knee flexion: 5/5   Hip IR: 4+/5 Hip IR: 4+/5   Hip ER: 4+/5 Hip ER: 4+/5   Hip extension:  4+/5 Hip " "extension: 4+/5   Hip abduction: 4+/5 Hip abduction: 4+/5   Hip adduction: 4+/5 Hip adduction 4+/5   Ankle dorsiflexion: 5/5 Ankle dorsiflexion: 5/5   Ankle plantarflexion: 5/5 Ankle plantarflexion: 5/5         Flexibility:  - reassessed 1/11/2022              Ely's test: R = 128 degrees ; L = 125 degrees              Popliteal Angle: R = 25 degrees ; L = 20 degrees      TREATMENT     Valentin received the treatments listed below:      therapeutic activities for reassessment for 10 minutes:  Described above    received therapeutic exercises to develop strength and ROM for 45  minutes including:    Exercises in bold were performed today:    Recumbent bike for UMANZOR 5 mins  LTR's x20  Cross body IT band stretch 1 min  PPT 15x5"  Piriformis stretch 2x30"  Hamstring stretch 2x30"  Open books x10  EIL x10  DKTC 10x5"  Bridging w/ black TB 2x10,3"  SL Bridge x10 ea  Clamshells blue TB 2x10   BKFO Blue TB x10   SOC 10x5"  SLS 2x30"  Tandem stance pallof press w/GTB x10 ea  Trunk extension 110# x20 (5 rep warm up at 70#) (0-65)   Squat at chair w/20# KB 2x10 - cues for hip hinge and proper body mechanics  +Cat cow x10  +Bird dog x10  Trunk rotation 30# x15 ea    NP:   Bird feeders x10 (At treadmill)    Next visit continue with Lifting/hip hinge mechanics    received the following manual therapy techniques: Soft tissue Mobilization were applied to the:  for 0 minutes, including:    Pt received cold pack to LB for 5 mins in Z-lie      PATIENT EDUCATION AND HOME EXERCISES     Home Exercises Provided and Patient Education Provided     Education provided:       Written Home Exercises Provided: Patient instructed to cont prior HEP. Exercises were reviewed and Valentin was able to demonstrate them prior to the end of the session.  Valentin demonstrated good  understanding of the education provided. See EMR under Patient Instructions for exercises provided during therapy sessions    ASSESSMENT   Patient presents today with mild complaints of " pain. Reassessment was performed with pt demonstrating gains in strength, ROM, and flexibility. Still presents with deficits in hip strength, static and dynamic balance, and body mechanics with lifting which are contributing to low back symptoms. Focus today on static and dynamic balance activities as well as education on lifting mechanics, focusing on hip hinge, continue with these in future visits. Initiated stability and mobilty exercises in quadruped today with good tolerance and no adverse effects, continue to progress as tolerated     Progress core/lumbpelvic strengthening and stabilization as tolerated.    Valentin is  progressing well towards his goals.   Pt prognosis is good  Pt will continue to benefit from skilled outpatient physical therapy to address the deficits listed in the problem list box on initial evaluation, provide pt/family education and to maximize pt's level of independence in the home and community environment.     Pt's spiritual, cultural and educational needs considered and pt agreeable to plan of care and goals.     Anticipated barriers to physical therapy:None    GOALS: Short Term Goals:  4 weeks  1. Report decreased in pain at worse less than  <   / =  4  /10  to increase tolerance for functional activities. Progressing, not met  2. Pt to increase B popliteal angle by greater than > or = 5 degrees in order to improve flexibility and posture. MET  3. Increased B LE MMT 1/2  to increase tolerance for ADL and work activities. MET  4. Pt to increase B popliteal angle by greater than  > or = 5degrees in order to improve flexibility and posture. MET  5. Pt to tolerate HEP to improve ROM and independence with ADL's. MET  6. Pt to improve range of motion by 25% to allow for improved functional mobility to allow for improvement in IADLs. MET     Long Term Goals: 8 weeks  1. Report decreased in pain at worse less than  <   / =  2  /10  to increase tolerance for functional mobility.  Progressing,  not met  2 .Pt to increase B popliteal angle by greater than > or = 10 degrees in order to improve flexibility and posture. Progressing, not met  3. Increased B LE MMT 1 grade to increase tolerance for ADL and work activities.Progressing, not met  4. Pt will report at 23% or less limitation on FOTO Lumbar spine survey  to demonstrate decrease in disability and improvement in back pain.Progressing, not met  5. Pt to be Independent with HEP to improve ROM and independence with ADL's. Progressing, not met  6. Pt to increase B popliteal angle by greater than > or = 10 degrees in order to improve flexibility and posture. Progressing, not met  7. Pt to demonstrate negative Prone Instability Test in order to show improved core strength for lumbar stabilization. Progressing, not met  8. Pt to improve range of motion by 75% to allow for improved functional mobility to allow for improvement in IADLs. Progressing, not met       PLAN   Plan of care Certification: 12/16/2021 to 2/16/2021.     Outpatient Physical Therapy 1 times weekly for 8 weeks to include the following interventions: Manual Therapy, Moist Heat/ Ice, Neuromuscular Re-ed, Patient Education, Self Care, Therapeutic Activites and Therapeutic Exercise. Dry needling    Sushant Mckeon, PT

## 2022-02-23 ENCOUNTER — PATIENT MESSAGE (OUTPATIENT)
Dept: OTOLARYNGOLOGY | Facility: CLINIC | Age: 62
End: 2022-02-23
Payer: COMMERCIAL

## 2022-02-23 ENCOUNTER — TELEPHONE (OUTPATIENT)
Dept: OTOLARYNGOLOGY | Facility: CLINIC | Age: 62
End: 2022-02-23
Payer: COMMERCIAL

## 2022-02-23 ENCOUNTER — LAB VISIT (OUTPATIENT)
Dept: LAB | Facility: OTHER | Age: 62
End: 2022-02-23
Attending: INTERNAL MEDICINE
Payer: COMMERCIAL

## 2022-02-23 ENCOUNTER — OFFICE VISIT (OUTPATIENT)
Dept: INTERNAL MEDICINE | Facility: CLINIC | Age: 62
End: 2022-02-23
Attending: INTERNAL MEDICINE
Payer: COMMERCIAL

## 2022-02-23 VITALS
WEIGHT: 232.81 LBS | BODY MASS INDEX: 27.49 KG/M2 | OXYGEN SATURATION: 96 % | DIASTOLIC BLOOD PRESSURE: 82 MMHG | HEIGHT: 77 IN | SYSTOLIC BLOOD PRESSURE: 128 MMHG | HEART RATE: 61 BPM

## 2022-02-23 DIAGNOSIS — R42 VERTIGO: ICD-10-CM

## 2022-02-23 DIAGNOSIS — Z00.00 ANNUAL PHYSICAL EXAM: Primary | ICD-10-CM

## 2022-02-23 DIAGNOSIS — E78.5 HYPERLIPIDEMIA, UNSPECIFIED HYPERLIPIDEMIA TYPE: ICD-10-CM

## 2022-02-23 LAB
ALBUMIN SERPL BCP-MCNC: 4.1 G/DL (ref 3.5–5.2)
ALP SERPL-CCNC: 76 U/L (ref 55–135)
ALT SERPL W/O P-5'-P-CCNC: 27 U/L (ref 10–44)
ANION GAP SERPL CALC-SCNC: 7 MMOL/L (ref 8–16)
AST SERPL-CCNC: 22 U/L (ref 10–40)
BASOPHILS # BLD AUTO: 0.04 K/UL (ref 0–0.2)
BASOPHILS NFR BLD: 0.5 % (ref 0–1.9)
BILIRUB SERPL-MCNC: 0.7 MG/DL (ref 0.1–1)
BUN SERPL-MCNC: 16 MG/DL (ref 6–20)
CALCIUM SERPL-MCNC: 9.9 MG/DL (ref 8.7–10.5)
CHLORIDE SERPL-SCNC: 103 MMOL/L (ref 95–110)
CHOLEST SERPL-MCNC: 195 MG/DL (ref 120–199)
CHOLEST/HDLC SERPL: 4 {RATIO} (ref 2–5)
CO2 SERPL-SCNC: 28 MMOL/L (ref 23–29)
CREAT SERPL-MCNC: 0.9 MG/DL (ref 0.5–1.4)
DIFFERENTIAL METHOD: NORMAL
EOSINOPHIL # BLD AUTO: 0.1 K/UL (ref 0–0.5)
EOSINOPHIL NFR BLD: 1.6 % (ref 0–8)
ERYTHROCYTE [DISTWIDTH] IN BLOOD BY AUTOMATED COUNT: 12.5 % (ref 11.5–14.5)
EST. GFR  (AFRICAN AMERICAN): >60 ML/MIN/1.73 M^2
EST. GFR  (NON AFRICAN AMERICAN): >60 ML/MIN/1.73 M^2
ESTIMATED AVG GLUCOSE: 108 MG/DL (ref 68–131)
GLUCOSE SERPL-MCNC: 92 MG/DL (ref 70–110)
HBA1C MFR BLD: 5.4 % (ref 4–5.6)
HCT VFR BLD AUTO: 49.5 % (ref 40–54)
HDLC SERPL-MCNC: 49 MG/DL (ref 40–75)
HDLC SERPL: 25.1 % (ref 20–50)
HGB BLD-MCNC: 16.5 G/DL (ref 14–18)
IMM GRANULOCYTES # BLD AUTO: 0.02 K/UL (ref 0–0.04)
IMM GRANULOCYTES NFR BLD AUTO: 0.3 % (ref 0–0.5)
LDLC SERPL CALC-MCNC: 118.4 MG/DL (ref 63–159)
LYMPHOCYTES # BLD AUTO: 2.1 K/UL (ref 1–4.8)
LYMPHOCYTES NFR BLD: 27.4 % (ref 18–48)
MCH RBC QN AUTO: 30 PG (ref 27–31)
MCHC RBC AUTO-ENTMCNC: 33.3 G/DL (ref 32–36)
MCV RBC AUTO: 90 FL (ref 82–98)
MONOCYTES # BLD AUTO: 0.7 K/UL (ref 0.3–1)
MONOCYTES NFR BLD: 8.5 % (ref 4–15)
NEUTROPHILS # BLD AUTO: 4.7 K/UL (ref 1.8–7.7)
NEUTROPHILS NFR BLD: 61.7 % (ref 38–73)
NONHDLC SERPL-MCNC: 146 MG/DL
NRBC BLD-RTO: 0 /100 WBC
PLATELET # BLD AUTO: 232 K/UL (ref 150–450)
PMV BLD AUTO: 11.3 FL (ref 9.2–12.9)
POTASSIUM SERPL-SCNC: 4.4 MMOL/L (ref 3.5–5.1)
PROT SERPL-MCNC: 7.6 G/DL (ref 6–8.4)
RBC # BLD AUTO: 5.5 M/UL (ref 4.6–6.2)
SODIUM SERPL-SCNC: 138 MMOL/L (ref 136–145)
TRIGL SERPL-MCNC: 138 MG/DL (ref 30–150)
TSH SERPL DL<=0.005 MIU/L-ACNC: 0.83 UIU/ML (ref 0.4–4)
VIT B12 SERPL-MCNC: 475 PG/ML (ref 210–950)
WBC # BLD AUTO: 7.67 K/UL (ref 3.9–12.7)

## 2022-02-23 PROCEDURE — 83036 HEMOGLOBIN GLYCOSYLATED A1C: CPT | Performed by: INTERNAL MEDICINE

## 2022-02-23 PROCEDURE — 1159F PR MEDICATION LIST DOCUMENTED IN MEDICAL RECORD: ICD-10-PCS | Mod: CPTII,S$GLB,, | Performed by: INTERNAL MEDICINE

## 2022-02-23 PROCEDURE — 80053 COMPREHEN METABOLIC PANEL: CPT | Performed by: INTERNAL MEDICINE

## 2022-02-23 PROCEDURE — 3079F DIAST BP 80-89 MM HG: CPT | Mod: CPTII,S$GLB,, | Performed by: INTERNAL MEDICINE

## 2022-02-23 PROCEDURE — 99999 PR PBB SHADOW E&M-EST. PATIENT-LVL IV: CPT | Mod: PBBFAC,,, | Performed by: INTERNAL MEDICINE

## 2022-02-23 PROCEDURE — 85025 COMPLETE CBC W/AUTO DIFF WBC: CPT | Performed by: INTERNAL MEDICINE

## 2022-02-23 PROCEDURE — 3008F PR BODY MASS INDEX (BMI) DOCUMENTED: ICD-10-PCS | Mod: CPTII,S$GLB,, | Performed by: INTERNAL MEDICINE

## 2022-02-23 PROCEDURE — 80061 LIPID PANEL: CPT | Performed by: INTERNAL MEDICINE

## 2022-02-23 PROCEDURE — 3074F PR MOST RECENT SYSTOLIC BLOOD PRESSURE < 130 MM HG: ICD-10-PCS | Mod: CPTII,S$GLB,, | Performed by: INTERNAL MEDICINE

## 2022-02-23 PROCEDURE — 3079F PR MOST RECENT DIASTOLIC BLOOD PRESSURE 80-89 MM HG: ICD-10-PCS | Mod: CPTII,S$GLB,, | Performed by: INTERNAL MEDICINE

## 2022-02-23 PROCEDURE — 3008F BODY MASS INDEX DOCD: CPT | Mod: CPTII,S$GLB,, | Performed by: INTERNAL MEDICINE

## 2022-02-23 PROCEDURE — 1159F MED LIST DOCD IN RCRD: CPT | Mod: CPTII,S$GLB,, | Performed by: INTERNAL MEDICINE

## 2022-02-23 PROCEDURE — 36415 COLL VENOUS BLD VENIPUNCTURE: CPT | Performed by: INTERNAL MEDICINE

## 2022-02-23 PROCEDURE — 99999 PR PBB SHADOW E&M-EST. PATIENT-LVL IV: ICD-10-PCS | Mod: PBBFAC,,, | Performed by: INTERNAL MEDICINE

## 2022-02-23 PROCEDURE — 99396 PR PREVENTIVE VISIT,EST,40-64: ICD-10-PCS | Mod: S$GLB,,, | Performed by: INTERNAL MEDICINE

## 2022-02-23 PROCEDURE — 82607 VITAMIN B-12: CPT | Performed by: INTERNAL MEDICINE

## 2022-02-23 PROCEDURE — 84443 ASSAY THYROID STIM HORMONE: CPT | Performed by: INTERNAL MEDICINE

## 2022-02-23 PROCEDURE — 3074F SYST BP LT 130 MM HG: CPT | Mod: CPTII,S$GLB,, | Performed by: INTERNAL MEDICINE

## 2022-02-23 PROCEDURE — 99396 PREV VISIT EST AGE 40-64: CPT | Mod: S$GLB,,, | Performed by: INTERNAL MEDICINE

## 2022-02-23 NOTE — TELEPHONE ENCOUNTER
----- Message from Oskar Armas MA sent at 2/23/2022 10:50 AM CST -----  Regarding: Ambulatory referral/consult to ENT  Good morning,  Please schedule this established pt for Vertigo [[id number]]. Referred/authorized by Dr. Mcnally.    Thanks,  MARY Schmitt

## 2022-02-23 NOTE — PROGRESS NOTES
Subjective:       Patient ID: Valentin Brito is a 61 y.o. male.    Chief Complaint: Dizziness (Felt like he was going to pass out. Started 1 week ago, resolved for a few days, and constant since Saturday. ) and Nausea    Here for     Visiting in [place] and was helping pull carpet from floor. He felt like he was losing his balance after he finished work. He was able to perform. He felt like he was being pulled to the left. 6-7 days after first episode he was seated on couch and said he did not feel well. When he adjusted himself on couch and sat more upright. When he did this he developed acute onset of     ### vertigo ###  02/222 Was walking in store with daughter and had difficulty ambulating due to frequent dizziness. Reports sensation of being pulled to the left over and over. Symptoms lasted for several hours. 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 No dysarthria, dysphagia, new numbness/tingling. Took 50mg of meclizine and slept for 14 hours. The following day took 1 tablet without SE. Performed Epley maneuver and has been okay since. Last episode was 4 days prior.   05/10/17 MRI brain c/ and s/ done for asymmetrical hearing loss:Focal enhancement within the basal turn of the left cochlea, suspicious for labyrinthitis or possible small intra-cochlear schwannoma.     Low back pain for several years. Pain free for first few hours of the day but simply with ADLs develops low back pain, band across lumbar region, 8/10, non-radiating. Denies associated numbness/tingling of ext, weakness of LE, saddle anesthesia, or bowel/bladder incontinence. Also notes that pain is much worse when rising after sitting for an hour. He denies any alleviating factors. Takes occasional NSAID         Review of Systems   Constitutional: Negative for appetite change, chills, fever and unexpected weight change.   HENT: Negative for hearing loss, sore throat and  "trouble swallowing.    Eyes: Negative for visual disturbance.   Respiratory: Negative for cough, chest tightness and shortness of breath.    Cardiovascular: Negative for chest pain and leg swelling.   Gastrointestinal: Negative for abdominal pain, blood in stool, constipation, diarrhea, nausea and vomiting.   Endocrine: Negative for polydipsia and polyuria.   Genitourinary: Negative for decreased urine volume, difficulty urinating, dysuria, frequency and urgency.   Musculoskeletal: Negative for gait problem.   Skin: Negative for rash.   Neurological: Negative for dizziness and numbness.   Psychiatric/Behavioral: The patient is not nervous/anxious.        Objective:      Vitals:    02/23/22 1017   BP: 128/82   BP Location: Right arm   Patient Position: Sitting   BP Method: Large (Manual)   Pulse: 61   SpO2: 96%   Weight: 105.6 kg (232 lb 12.9 oz)   Height: 6' 5" (1.956 m)      Physical Exam  Constitutional:       General: He is not in acute distress.     Appearance: He is well-developed.   HENT:      Head: Normocephalic and atraumatic.      Mouth/Throat:      Pharynx: No oropharyngeal exudate.   Eyes:      General: No scleral icterus.     Conjunctiva/sclera: Conjunctivae normal.      Pupils: Pupils are equal, round, and reactive to light.   Neck:      Thyroid: No thyromegaly.   Cardiovascular:      Rate and Rhythm: Normal rate and regular rhythm.      Heart sounds: Normal heart sounds. No murmur heard.  Pulmonary:      Effort: Pulmonary effort is normal.      Breath sounds: Normal breath sounds. No wheezing or rales.   Abdominal:      General: There is no distension.      Palpations: Abdomen is soft.      Tenderness: There is no abdominal tenderness.   Musculoskeletal:         General: No tenderness.   Lymphadenopathy:      Cervical: No cervical adenopathy.   Skin:     General: Skin is warm and dry.   Neurological:      Mental Status: He is alert and oriented to person, place, and time.   Psychiatric:         " Behavior: Behavior normal.         Assessment:       1. Annual physical exam    2. Vertigo    3. Hyperlipidemia, unspecified hyperlipidemia type        Plan:       Valentin was seen today for dizziness and nausea.    Diagnoses and all orders for this visit:    Annual physical exam  -     Comprehensive Metabolic Panel; Future  -     Lipid Panel; Future  -     TSH; Future  -     CBC Auto Differential; Future  -     Hemoglobin A1C; Future    Vertigo  Start with increased sinus care and valsalva. F/u ENT for formal eval. Pt prefers to wait for eval before potential update of MRI to evaluate for schwannoma   -     Ambulatory referral/consult to ENT; Future  -     Comprehensive Metabolic Panel; Future  -     TSH; Future  -     CBC Auto Differential; Future  -     Vitamin B12; Future    Hyperlipidemia, unspecified hyperlipidemia type  -     Comprehensive Metabolic Panel; Future  -     Lipid Panel; Future  -     TSH; Future  -     CBC Auto Differential; Future  -     Hemoglobin A1C; Future           Francisco Huynh MD  Internal Medicine-[facility]        Side effects of medication(s) were discussed in detail and patient voiced understanding.  Patient will call back for any issues or complications.

## 2022-02-24 DIAGNOSIS — D33.3 ACOUSTIC NEUROMA: Primary | ICD-10-CM

## 2022-03-07 ENCOUNTER — TELEPHONE (OUTPATIENT)
Dept: INTERNAL MEDICINE | Facility: CLINIC | Age: 62
End: 2022-03-07
Payer: COMMERCIAL

## 2022-03-07 NOTE — TELEPHONE ENCOUNTER
Message sent to Ms Botello for advice on scheduling    Patient would like to get medical advice.   Symptoms (please be specific):  Cough,sore throat and runny nose   How long have you had these symptoms: Days   Would you like a call back, or a response through your [facility] portal?:  call   Pharmacy name and phone # (copy from chart):   [facility] #[id number] - [place] - [address] AT [facility] & [address]   [place] [address]   Phone: [phone] Fax: [phone]

## 2022-03-07 NOTE — TELEPHONE ENCOUNTER
----- Message from Angelica Hairyasmine sent at 3/4/2022  4:25 PM CST -----  Contact: Patient @[phone]  Patient would like to get medical advice.  Symptoms (please be specific):  Cough,sore throat and runny nose  How long have you had these symptoms: Days  Would you like a call back, or a response through your [facility] portal?:  call   Pharmacy name and phone # (copy from chart):  [facility] #[id number] - [place] - [address] [facility] [address]  [address]  [place] [address]  Phone: [phone] Fax: [phone]    [id number] [place] - [address]  [address] [place] [address]  Phone: [phone] Fax: [phone]         Comments:  Negative for Covid. Would like X ray

## 2022-03-09 ENCOUNTER — OFFICE VISIT (OUTPATIENT)
Dept: INTERNAL MEDICINE | Facility: CLINIC | Age: 62
End: 2022-03-09
Attending: INTERNAL MEDICINE
Payer: COMMERCIAL

## 2022-03-09 VITALS
HEIGHT: 77 IN | WEIGHT: 233.44 LBS | BODY MASS INDEX: 27.56 KG/M2 | DIASTOLIC BLOOD PRESSURE: 78 MMHG | HEART RATE: 62 BPM | SYSTOLIC BLOOD PRESSURE: 114 MMHG | OXYGEN SATURATION: 97 %

## 2022-03-09 DIAGNOSIS — K21.9 GASTROESOPHAGEAL REFLUX DISEASE, UNSPECIFIED WHETHER ESOPHAGITIS PRESENT: ICD-10-CM

## 2022-03-09 DIAGNOSIS — E78.5 HYPERLIPIDEMIA, UNSPECIFIED HYPERLIPIDEMIA TYPE: ICD-10-CM

## 2022-03-09 DIAGNOSIS — J06.9 UPPER RESPIRATORY TRACT INFECTION, UNSPECIFIED TYPE: Primary | ICD-10-CM

## 2022-03-09 DIAGNOSIS — I47.10 SVT (SUPRAVENTRICULAR TACHYCARDIA): ICD-10-CM

## 2022-03-09 PROCEDURE — 99213 OFFICE O/P EST LOW 20 MIN: CPT | Mod: S$GLB,,, | Performed by: INTERNAL MEDICINE

## 2022-03-09 PROCEDURE — 3008F BODY MASS INDEX DOCD: CPT | Mod: CPTII,S$GLB,, | Performed by: INTERNAL MEDICINE

## 2022-03-09 PROCEDURE — 1159F PR MEDICATION LIST DOCUMENTED IN MEDICAL RECORD: ICD-10-PCS | Mod: CPTII,S$GLB,, | Performed by: INTERNAL MEDICINE

## 2022-03-09 PROCEDURE — 3044F HG A1C LEVEL LT 7.0%: CPT | Mod: CPTII,S$GLB,, | Performed by: INTERNAL MEDICINE

## 2022-03-09 PROCEDURE — 3008F PR BODY MASS INDEX (BMI) DOCUMENTED: ICD-10-PCS | Mod: CPTII,S$GLB,, | Performed by: INTERNAL MEDICINE

## 2022-03-09 PROCEDURE — 3078F PR MOST RECENT DIASTOLIC BLOOD PRESSURE < 80 MM HG: ICD-10-PCS | Mod: CPTII,S$GLB,, | Performed by: INTERNAL MEDICINE

## 2022-03-09 PROCEDURE — 3074F PR MOST RECENT SYSTOLIC BLOOD PRESSURE < 130 MM HG: ICD-10-PCS | Mod: CPTII,S$GLB,, | Performed by: INTERNAL MEDICINE

## 2022-03-09 PROCEDURE — 99999 PR PBB SHADOW E&M-EST. PATIENT-LVL III: ICD-10-PCS | Mod: PBBFAC,,, | Performed by: INTERNAL MEDICINE

## 2022-03-09 PROCEDURE — 99999 PR PBB SHADOW E&M-EST. PATIENT-LVL III: CPT | Mod: PBBFAC,,, | Performed by: INTERNAL MEDICINE

## 2022-03-09 PROCEDURE — 1159F MED LIST DOCD IN RCRD: CPT | Mod: CPTII,S$GLB,, | Performed by: INTERNAL MEDICINE

## 2022-03-09 PROCEDURE — 3044F PR MOST RECENT HEMOGLOBIN A1C LEVEL <7.0%: ICD-10-PCS | Mod: CPTII,S$GLB,, | Performed by: INTERNAL MEDICINE

## 2022-03-09 PROCEDURE — 3074F SYST BP LT 130 MM HG: CPT | Mod: CPTII,S$GLB,, | Performed by: INTERNAL MEDICINE

## 2022-03-09 PROCEDURE — 3078F DIAST BP <80 MM HG: CPT | Mod: CPTII,S$GLB,, | Performed by: INTERNAL MEDICINE

## 2022-03-09 PROCEDURE — 99213 PR OFFICE/OUTPT VISIT, EST, LEVL III, 20-29 MIN: ICD-10-PCS | Mod: S$GLB,,, | Performed by: INTERNAL MEDICINE

## 2022-03-10 ENCOUNTER — HOSPITAL ENCOUNTER (OUTPATIENT)
Dept: RADIOLOGY | Facility: HOSPITAL | Age: 62
Discharge: HOME | End: 2022-03-10
Attending: OTOLARYNGOLOGY
Payer: COMMERCIAL

## 2022-03-10 DIAGNOSIS — D33.3 ACOUSTIC NEUROMA: ICD-10-CM

## 2022-03-10 PROCEDURE — 70553 MRI BRAIN STEM W/O & W/DYE: CPT | Mod: TC

## 2022-03-10 PROCEDURE — A9585 GADOBUTROL INJECTION: HCPCS | Performed by: OTOLARYNGOLOGY

## 2022-03-10 PROCEDURE — 25500020 PHARM REV CODE 255: Performed by: OTOLARYNGOLOGY

## 2022-03-10 PROCEDURE — 70553 MRI BRAIN STEM W/O & W/DYE: CPT | Mod: 26,,, | Performed by: RADIOLOGY

## 2022-03-10 PROCEDURE — 70553 MRI BRAIN W WO CONTRAST: ICD-10-PCS | Mod: 26,,, | Performed by: RADIOLOGY

## 2022-03-10 RX ORDER — GADOBUTROL 604.72 MG/ML
10 INJECTION INTRAVENOUS
Status: COMPLETED | OUTPATIENT
Start: 2022-03-10 | End: 2022-03-10

## 2022-03-10 RX ADMIN — GADOBUTROL 10 ML: 604.72 INJECTION INTRAVENOUS at 06:03

## 2022-03-16 NOTE — PROGRESS NOTES
"Subjective:       Patient ID: Valentin Brito is a 61 y.o. male.    Chief Complaint: flu like symptoms    Here for urgent visit    Wife scheduled for appointment today.  Patient is scheduled for tomorrow.  He is here today so we will take care of his needs today.  He and his wife both upper respiratory symptoms of nasal congestion, cough for approximately 1 week now.  Home COVID testing negative.      Review of Systems   Constitutional: Negative for appetite change, chills, fever and unexpected weight change.   HENT: Positive for congestion and sore throat. Negative for hearing loss and trouble swallowing.    Eyes: Negative for visual disturbance.   Respiratory: Positive for cough. Negative for chest tightness and shortness of breath.    Cardiovascular: Negative for chest pain and leg swelling.   Gastrointestinal: Negative for abdominal pain, blood in stool, constipation, diarrhea, nausea and vomiting.   Endocrine: Negative for polydipsia and polyuria.   Genitourinary: Negative for decreased urine volume, difficulty urinating, dysuria, frequency and urgency.   Musculoskeletal: Negative for gait problem.   Skin: Negative for rash.   Neurological: Negative for dizziness and numbness.   Psychiatric/Behavioral: The patient is not nervous/anxious.        Objective:      Vitals:    03/09/22 1542   BP: 114/78   Pulse: 62   SpO2: 97%   Weight: 105.9 kg (233 lb 7.5 oz)   Height: 6' 5" (1.956 m)      Physical Exam  Constitutional:       General: He is not in acute distress.     Appearance: He is well-developed.   HENT:      Head: Normocephalic and atraumatic.      Mouth/Throat:      Pharynx: No oropharyngeal exudate.   Eyes:      General: No scleral icterus.     Conjunctiva/sclera: Conjunctivae normal.      Pupils: Pupils are equal, round, and reactive to light.   Neck:      Thyroid: No thyromegaly.   Cardiovascular:      Rate and Rhythm: Normal rate and regular rhythm.      Heart sounds: Normal heart sounds. No murmur " heard.  Pulmonary:      Effort: Pulmonary effort is normal.      Breath sounds: Normal breath sounds. No wheezing or rales.   Abdominal:      General: There is no distension.      Palpations: Abdomen is soft.      Tenderness: There is no abdominal tenderness.   Musculoskeletal:         General: No tenderness.   Lymphadenopathy:      Cervical: No cervical adenopathy.   Skin:     General: Skin is warm and dry.   Neurological:      Mental Status: He is alert and oriented to person, place, and time.   Psychiatric:         Behavior: Behavior normal.         Assessment:       1. Upper respiratory tract infection, unspecified type    2. Gastroesophageal reflux disease, unspecified whether esophagitis present    3. Hyperlipidemia, unspecified hyperlipidemia type    4. SVT (supraventricular tachycardia)        Plan:       Valentin was seen today for flu like symptoms.    Diagnoses and all orders for this visit:    Upper respiratory tract infection, unspecified type   Presumed viral. OTC meds discussed.  Prompts to call office discussed.    Gastroesophageal reflux disease, unspecified whether esophagitis present    Hyperlipidemia, unspecified hyperlipidemia type    SVT (supraventricular tachycardia)           Francisco Huynh MD  Internal Medicine-[facility]        Side effects of medication(s) were discussed in detail and patient voiced understanding.  Patient will call back for any issues or complications.

## 2022-03-21 ENCOUNTER — CLINICAL SUPPORT (OUTPATIENT)
Dept: AUDIOLOGY | Facility: CLINIC | Age: 62
End: 2022-03-21
Payer: COMMERCIAL

## 2022-03-21 ENCOUNTER — OFFICE VISIT (OUTPATIENT)
Dept: OTOLARYNGOLOGY | Facility: CLINIC | Age: 62
End: 2022-03-21
Payer: COMMERCIAL

## 2022-03-21 VITALS — BODY MASS INDEX: 27.45 KG/M2 | WEIGHT: 231.5 LBS

## 2022-03-21 DIAGNOSIS — H81.8X2 LEFT-SIDED VESTIBULAR WEAKNESS: Primary | ICD-10-CM

## 2022-03-21 DIAGNOSIS — R42 MIGRAINOUS DIZZINESS: ICD-10-CM

## 2022-03-21 DIAGNOSIS — D33.3 ACOUSTIC NEUROMA: ICD-10-CM

## 2022-03-21 DIAGNOSIS — H81.4 VERTIGO OF CENTRAL ORIGIN: ICD-10-CM

## 2022-03-21 DIAGNOSIS — H90.3 ASYMMETRICAL SENSORINEURAL HEARING LOSS: ICD-10-CM

## 2022-03-21 DIAGNOSIS — H90.3 ASYMMETRICAL SENSORINEURAL HEARING LOSS: Primary | ICD-10-CM

## 2022-03-21 DIAGNOSIS — R42 VERTIGO: Primary | ICD-10-CM

## 2022-03-21 PROCEDURE — 3008F BODY MASS INDEX DOCD: CPT | Mod: CPTII,S$GLB,, | Performed by: OTOLARYNGOLOGY

## 2022-03-21 PROCEDURE — 99999 PR PBB SHADOW E&M-EST. PATIENT-LVL I: ICD-10-PCS | Mod: PBBFAC,,, | Performed by: AUDIOLOGIST

## 2022-03-21 PROCEDURE — 1159F MED LIST DOCD IN RCRD: CPT | Mod: CPTII,S$GLB,, | Performed by: OTOLARYNGOLOGY

## 2022-03-21 PROCEDURE — 3044F PR MOST RECENT HEMOGLOBIN A1C LEVEL <7.0%: ICD-10-PCS | Mod: CPTII,S$GLB,, | Performed by: OTOLARYNGOLOGY

## 2022-03-21 PROCEDURE — 92540 BASIC VESTIBULAR EVALUATION: CPT | Mod: S$GLB,,, | Performed by: AUDIOLOGIST

## 2022-03-21 PROCEDURE — 99205 OFFICE O/P NEW HI 60 MIN: CPT | Mod: S$GLB,,, | Performed by: OTOLARYNGOLOGY

## 2022-03-21 PROCEDURE — 99999 PR PBB SHADOW E&M-EST. PATIENT-LVL I: CPT | Mod: PBBFAC,,, | Performed by: AUDIOLOGIST

## 2022-03-21 PROCEDURE — 92557 COMPREHENSIVE HEARING TEST: CPT | Mod: S$GLB,,, | Performed by: AUDIOLOGIST

## 2022-03-21 PROCEDURE — 92567 TYMPANOMETRY: CPT | Mod: S$GLB,,, | Performed by: AUDIOLOGIST

## 2022-03-21 PROCEDURE — 92557 PR COMPREHENSIVE HEARING TEST: ICD-10-PCS | Mod: S$GLB,,, | Performed by: AUDIOLOGIST

## 2022-03-21 PROCEDURE — 99205 PR OFFICE/OUTPT VISIT, NEW, LEVL V, 60-74 MIN: ICD-10-PCS | Mod: S$GLB,,, | Performed by: OTOLARYNGOLOGY

## 2022-03-21 PROCEDURE — 92537 CALORIC VSTBLR TEST W/REC: CPT | Mod: S$GLB,,, | Performed by: AUDIOLOGIST

## 2022-03-21 PROCEDURE — 1159F PR MEDICATION LIST DOCUMENTED IN MEDICAL RECORD: ICD-10-PCS | Mod: CPTII,S$GLB,, | Performed by: OTOLARYNGOLOGY

## 2022-03-21 PROCEDURE — 3044F HG A1C LEVEL LT 7.0%: CPT | Mod: CPTII,S$GLB,, | Performed by: OTOLARYNGOLOGY

## 2022-03-21 PROCEDURE — 92567 PR TYMPA2METRY: ICD-10-PCS | Mod: S$GLB,,, | Performed by: AUDIOLOGIST

## 2022-03-21 PROCEDURE — 99999 PR PBB SHADOW E&M-EST. PATIENT-LVL III: ICD-10-PCS | Mod: PBBFAC,,, | Performed by: OTOLARYNGOLOGY

## 2022-03-21 PROCEDURE — 92540 PR VESTIBULAR EVAL NYSTAG FOVL&PERPH STIM OSCIL TRACKING: ICD-10-PCS | Mod: S$GLB,,, | Performed by: AUDIOLOGIST

## 2022-03-21 PROCEDURE — 99999 PR PBB SHADOW E&M-EST. PATIENT-LVL III: CPT | Mod: PBBFAC,,, | Performed by: OTOLARYNGOLOGY

## 2022-03-21 PROCEDURE — 3008F PR BODY MASS INDEX (BMI) DOCUMENTED: ICD-10-PCS | Mod: CPTII,S$GLB,, | Performed by: OTOLARYNGOLOGY

## 2022-03-21 PROCEDURE — 92537 PR CALORIC VSTBLR TEST W/REC BITHERMAL: ICD-10-PCS | Mod: S$GLB,,, | Performed by: AUDIOLOGIST

## 2022-03-21 NOTE — PROGRESS NOTES
Otology/Neurotology History and Physical     CC: Increasing dizziness    HPI:  Valentin Howell is a 61 y.o. male who was referred to me by Dr. Francisco Mcnally.    Patient was last seen by me in 2017, at that time, he was found to have a left sided cochlear schwannoma on MRI. He had an asymmetric loss, but was not having any balance issues. Over the past few months, he reports that he has been having 3 episodes of imbalance/ dizziness. He describes feeling unsteady on his feet and feeling as though he is falling to his left, all triggered by motion or URI. No ear sx's. States when it comes on, the sensation seems to last for a few days before subsiding. Significant  Headaches in past, but notes no association with his imbalance.      Past Medical History  He has a past medical history of Acid reflux and Hypercholesteremia.    Past Surgical History  He has a past surgical history that includes Tonsillectomy; Abdominal hernia repair; Hernia repair (Bilateral); and Heel spur surgery (Right).    Family History  His family history includes Hyperlipidemia in his father.    Social History  He reports that he has never smoked. He has never used smokeless tobacco. He reports previous alcohol use. He reports that he does not use drugs.    Allergies  He has No Known Allergies.    Medications  He has a current medication list which includes the following prescription(s): atorvastatin, famotidine, tadalafil, and aspirin.    Review of Systems   Constitutional: Negative.    HENT: Positive for hearing loss and tinnitus. Negative for ear discharge and ear pain.    Respiratory: Positive for cough.    Cardiovascular: Negative.    Endocrine: Negative.    Musculoskeletal: Positive for back pain.   Skin: Negative.    Allergic/Immunologic: Negative.    Neurological: Positive for dizziness.   Hematological: Negative.    Psychiatric/Behavioral: Negative.           Objective:     Wt 105 kg (231 lb 7.7 oz)   BMI 27.45 kg/m²    Physical  Exam  Constitutional: Well appearing/communicating. Voice clear. NAD.  Eyes: EOM I Bilaterally  Head/Face: Normocephalic.  House Brackmann I Bilaterally.  Right Ear: External Auditory Canal WNL,TM w/o masses/lesions/perforations.  Auricle WNL.  Left Ear: External Auditory Canal WNL,TM w/o masses/lesions/perforations. Auricle WNL.  Nose: No gross nasal septal deviation. Inferior Turbinates 2+ bilaterally. No septal perforation. No masses/lesions. External nasal skin without masses/lesions.  Oral Cavity: Gingiva/lips WNL. Oral Tongue mobile. Hard Palate WNL.   Oropharynx: Tonsillar fossa/pharyngeal wall without lesions. Posterior oropharynx WNL.  Soft palate without masses. Midline uvula.   Neck/Lymphatic: No LAD I-VI bilaterally.  No thyromegaly.  No masses noted on exam.  Neuro/Psychiatric: AOx3.  Normal mood and affect.   Respiratory: Normal respiratory effort, no stridor/stertor, no retractions noted.      Procedure    None        Data Reviewed          VNG with L RVR  MRI Brain w wo - stable appearance of cochlear schwannoma when compared to MRI from 2017       Assessment:     1. Vertigo    2. Asymmetrical sensorineural hearing loss    3. Acoustic neuroma    4. Migraine dizz     Plan:   Discussed 2 year follow up with MRI    Can have audiology evaluation for hearing     Migraine diet/ Handout.    Prn Meclizine 25 mg 1-2 PO Tid prn dizz.

## 2022-03-21 NOTE — PROGRESS NOTES
Mr. Valentin Howell was seen in the clinic today for an audiological evaluation.  Mr. Mikey Howell reported a history of hearing loss for the left ear.    Audiological testing revealed normal hearing sloping to a mild to moderately-severe high frequency sensorineural hearing loss for the right ear and a severe rising to moderately-severe sloping to a severe to profound sensorineural hearing loss for the left ear.  A speech reception threshold was obtained at 20 dBHL for the right ear and at 85 dBHL for the left ear.  Speech discrimination was 96% for the right ear and 0% for the left ear.      Tympanometry testing revealed a Type A tympanogram for the right ear and a Type A tympanogram for the left ear.      Recommendations:  1. Otologic evaluation  2. Annual audiological evaluation  3. Hearing protection when in noise   4. Hearing loss consultation following medical clearance

## 2022-03-21 NOTE — PROGRESS NOTES
VNG Evaluation    Referring physician:  Dr. Pena    61 y.o. male complains of dizziness.  Symptoms are not provoked by any specific movement and have been 2 isolated episodes over the past 6 weeks.  Mr. Mikey Howell stated that each episode lasted for approximately 3-4 days with the last episode occurring 3 weeks ago.  He denied taking any medication that would affect the results of today's evaluation.      Gaze nystagmus was absent.    Oculomotor function was abnormal: smooth pursuit gain.    Spontaneous nystagmus was absent.    The head-hanging left Hallpike was negative.    The head-hanging right Hallpike was negative.    Static positional nystagmus was absent.    Unilateral weakness: 27% (left)  Directional preponderance: 0%    RW: 15 d/s  LW:  8 d/s  RC: 18 d/s   d/s    Fixation suppression was abnormal for the right warm, right cool, and left cool conditions.    Impression: VNG results suggest a left peripheral vestibular abnormality accompanied by central vestibular and central oculomotor dysfunction.    Recommendations:   1. An at-home trial with Cawthorne exercises. A printed copy was provided to Mr. Mikey Howell at today's appointment.  2. Referral to Neurology.  3. Follow-up with Dr. Pena to review the results of today's evaluation.

## 2022-08-18 DIAGNOSIS — E78.5 HYPERLIPIDEMIA, UNSPECIFIED HYPERLIPIDEMIA TYPE: ICD-10-CM

## 2022-08-18 DIAGNOSIS — N52.8 OTHER MALE ERECTILE DYSFUNCTION: ICD-10-CM

## 2022-08-18 DIAGNOSIS — K21.9 GASTROESOPHAGEAL REFLUX DISEASE, UNSPECIFIED WHETHER ESOPHAGITIS PRESENT: Primary | ICD-10-CM

## 2022-08-18 DIAGNOSIS — M54.50 LOW BACK PAIN, UNSPECIFIED BACK PAIN LATERALITY, UNSPECIFIED CHRONICITY, UNSPECIFIED WHETHER SCIATICA PRESENT: ICD-10-CM

## 2022-08-18 NOTE — TELEPHONE ENCOUNTER
MRS. COLLINS CALLED FROM [facility] ON [facility] ASKING FOR SPECIFICALLY HIS HLD MEDICATION TO BE REFILLED. I INFORMED HER THAT I SEE THAT PT IS TAKING ATORVASTAIN FOR HLD AFTER REVIEWING PT MEDICATION SECTION IN HIS CHART.  I ASKED MRS. COLLINS IF PT NEEDED HIS ENTIRE MED LIST REFILLED SINCE HE'S ONLY ON 4 [address] AND SHE SAID IF CAN AND I TOLD HER I'LL DO THAT WAY SINCE SHE WAS UNSURE IF PT NEEDED ALL REFILLED    LOV 3/9/2022;allergies confirmed

## 2022-08-23 RX ORDER — ATORVASTATIN CALCIUM 20 MG/1
20 TABLET, FILM COATED ORAL DAILY
Qty: 90 TABLET | Refills: 3 | Status: SHIPPED | OUTPATIENT
Start: 2022-08-23 | End: 2023-08-05

## 2022-08-23 RX ORDER — TADALAFIL 10 MG/1
10 TABLET ORAL DAILY
Qty: 90 TABLET | Refills: 3 | Status: SHIPPED | OUTPATIENT
Start: 2022-08-23 | End: 2024-03-08

## 2022-08-23 RX ORDER — FAMOTIDINE 10 MG/1
10 TABLET ORAL 2 TIMES DAILY PRN
Qty: 90 TABLET | Refills: 3 | Status: SHIPPED | OUTPATIENT
Start: 2022-08-23

## 2022-08-23 NOTE — TELEPHONE ENCOUNTER
No new care gaps identified.  [facility] Embedded Care Gaps. Reference number: [id number]. 8/23/2022   9:27:29 AM MEAGANT

## 2022-08-23 NOTE — TELEPHONE ENCOUNTER
----- Message from Almas Chong sent at 8/23/2022  9:13 AM CDT -----      Can the clinic reply in [facility]:N        Please refill the medication(s) listed below. Please call the patient when the prescription(s) is ready for  at this phone number         Medication #1 atorvastatin (LIPITOR) 20 MG tablet    Medication #2 famotidine (PEPCID) 10 MG tablet    Medication #3 tadalafiL (CIALIS) 10 MG tablet    Medication #4 aspirin 325 MG tablet      Preferred Pharmacy: [facility] [address]   Phone:  [phone]  Fax:  [phone]

## 2022-08-29 RX ORDER — ASPIRIN 325 MG
81 TABLET ORAL DAILY
Qty: 90 TABLET | Refills: 3 | Status: CANCELLED | OUTPATIENT
Start: 2022-08-29

## 2022-08-31 RX ORDER — NAPROXEN SODIUM 220 MG/1
81 TABLET, FILM COATED ORAL DAILY
Qty: 90 TABLET | Refills: 3 | Status: SHIPPED | OUTPATIENT
Start: 2022-08-31 | End: 2023-08-10 | Stop reason: SDUPTHER

## 2023-02-03 ENCOUNTER — TELEPHONE (OUTPATIENT)
Dept: INTERNAL MEDICINE | Facility: CLINIC | Age: 63
End: 2023-02-03
Payer: COMMERCIAL

## 2023-02-03 NOTE — TELEPHONE ENCOUNTER
APPT FOR 2/23/2023 RESCHEDULED W/ PT AS DR. JOSEPH WILL BE OUT OF THE OFFICE FROM 2/20/2023-2/24/2023

## 2023-02-05 ENCOUNTER — PATIENT MESSAGE (OUTPATIENT)
Dept: CARDIOLOGY | Facility: CLINIC | Age: 63
End: 2023-02-05
Payer: COMMERCIAL

## 2023-02-06 DIAGNOSIS — I47.10 SVT (SUPRAVENTRICULAR TACHYCARDIA): ICD-10-CM

## 2023-02-06 RX ORDER — METOPROLOL TARTRATE 25 MG/1
25 TABLET, FILM COATED ORAL DAILY PRN
Qty: 60 TABLET | Refills: 6 | Status: SHIPPED | OUTPATIENT
Start: 2023-02-06 | End: 2023-08-16

## 2023-03-06 ENCOUNTER — LAB VISIT (OUTPATIENT)
Dept: LAB | Facility: OTHER | Age: 63
End: 2023-03-06
Attending: INTERNAL MEDICINE
Payer: COMMERCIAL

## 2023-03-06 ENCOUNTER — OFFICE VISIT (OUTPATIENT)
Dept: INTERNAL MEDICINE | Facility: CLINIC | Age: 63
End: 2023-03-06
Attending: INTERNAL MEDICINE
Payer: COMMERCIAL

## 2023-03-06 VITALS
WEIGHT: 236.75 LBS | HEART RATE: 60 BPM | HEIGHT: 77 IN | DIASTOLIC BLOOD PRESSURE: 80 MMHG | BODY MASS INDEX: 27.95 KG/M2 | OXYGEN SATURATION: 98 % | SYSTOLIC BLOOD PRESSURE: 124 MMHG

## 2023-03-06 DIAGNOSIS — Z00.00 ANNUAL PHYSICAL EXAM: Primary | ICD-10-CM

## 2023-03-06 DIAGNOSIS — Z11.4 ENCOUNTER FOR SCREENING FOR HIV: ICD-10-CM

## 2023-03-06 DIAGNOSIS — D33.3 LEFT-SIDED ACOUSTIC NEUROMA: ICD-10-CM

## 2023-03-06 DIAGNOSIS — Z00.00 ANNUAL PHYSICAL EXAM: ICD-10-CM

## 2023-03-06 DIAGNOSIS — I47.10 SVT (SUPRAVENTRICULAR TACHYCARDIA): ICD-10-CM

## 2023-03-06 DIAGNOSIS — M54.16 LUMBAR RADICULOPATHY: ICD-10-CM

## 2023-03-06 LAB
ALBUMIN SERPL BCP-MCNC: 4.3 G/DL (ref 3.5–5.2)
ALP SERPL-CCNC: 67 U/L (ref 55–135)
ALT SERPL W/O P-5'-P-CCNC: 30 U/L (ref 10–44)
ANION GAP SERPL CALC-SCNC: 7 MMOL/L (ref 8–16)
AST SERPL-CCNC: 24 U/L (ref 10–40)
BASOPHILS # BLD AUTO: 0.03 K/UL (ref 0–0.2)
BASOPHILS NFR BLD: 0.5 % (ref 0–1.9)
BILIRUB SERPL-MCNC: 0.5 MG/DL (ref 0.1–1)
BUN SERPL-MCNC: 16 MG/DL (ref 8–23)
CALCIUM SERPL-MCNC: 9.4 MG/DL (ref 8.7–10.5)
CHLORIDE SERPL-SCNC: 105 MMOL/L (ref 95–110)
CHOLEST SERPL-MCNC: 182 MG/DL (ref 120–199)
CHOLEST/HDLC SERPL: 3.2 {RATIO} (ref 2–5)
CO2 SERPL-SCNC: 26 MMOL/L (ref 23–29)
COMPLEXED PSA SERPL-MCNC: 0.78 NG/ML (ref 0–4)
CREAT SERPL-MCNC: 0.9 MG/DL (ref 0.5–1.4)
DIFFERENTIAL METHOD: NORMAL
EOSINOPHIL # BLD AUTO: 0.1 K/UL (ref 0–0.5)
EOSINOPHIL NFR BLD: 1.1 % (ref 0–8)
ERYTHROCYTE [DISTWIDTH] IN BLOOD BY AUTOMATED COUNT: 12.5 % (ref 11.5–14.5)
EST. GFR  (NO RACE VARIABLE): >60 ML/MIN/1.73 M^2
ESTIMATED AVG GLUCOSE: 108 MG/DL (ref 68–131)
GLUCOSE SERPL-MCNC: 96 MG/DL (ref 70–110)
HBA1C MFR BLD: 5.4 % (ref 4–5.6)
HCT VFR BLD AUTO: 48 % (ref 40–54)
HDLC SERPL-MCNC: 57 MG/DL (ref 40–75)
HDLC SERPL: 31.3 % (ref 20–50)
HGB BLD-MCNC: 16.1 G/DL (ref 14–18)
HIV 1+2 AB+HIV1 P24 AG SERPL QL IA: NORMAL
IMM GRANULOCYTES # BLD AUTO: 0.02 K/UL (ref 0–0.04)
IMM GRANULOCYTES NFR BLD AUTO: 0.3 % (ref 0–0.5)
LDLC SERPL CALC-MCNC: 107.8 MG/DL (ref 63–159)
LYMPHOCYTES # BLD AUTO: 2.2 K/UL (ref 1–4.8)
LYMPHOCYTES NFR BLD: 34 % (ref 18–48)
MCH RBC QN AUTO: 29.8 PG (ref 27–31)
MCHC RBC AUTO-ENTMCNC: 33.5 G/DL (ref 32–36)
MCV RBC AUTO: 89 FL (ref 82–98)
MONOCYTES # BLD AUTO: 0.6 K/UL (ref 0.3–1)
MONOCYTES NFR BLD: 9.3 % (ref 4–15)
NEUTROPHILS # BLD AUTO: 3.6 K/UL (ref 1.8–7.7)
NEUTROPHILS NFR BLD: 54.8 % (ref 38–73)
NONHDLC SERPL-MCNC: 125 MG/DL
NRBC BLD-RTO: 0 /100 WBC
PLATELET # BLD AUTO: 212 K/UL (ref 150–450)
PMV BLD AUTO: 11.1 FL (ref 9.2–12.9)
POTASSIUM SERPL-SCNC: 4.2 MMOL/L (ref 3.5–5.1)
PROT SERPL-MCNC: 7.7 G/DL (ref 6–8.4)
RBC # BLD AUTO: 5.4 M/UL (ref 4.6–6.2)
SODIUM SERPL-SCNC: 138 MMOL/L (ref 136–145)
TRIGL SERPL-MCNC: 86 MG/DL (ref 30–150)
TSH SERPL DL<=0.005 MIU/L-ACNC: 0.63 UIU/ML (ref 0.4–4)
WBC # BLD AUTO: 6.47 K/UL (ref 3.9–12.7)

## 2023-03-06 PROCEDURE — 36415 COLL VENOUS BLD VENIPUNCTURE: CPT | Performed by: INTERNAL MEDICINE

## 2023-03-06 PROCEDURE — 85025 COMPLETE CBC W/AUTO DIFF WBC: CPT | Performed by: INTERNAL MEDICINE

## 2023-03-06 PROCEDURE — 3079F PR MOST RECENT DIASTOLIC BLOOD PRESSURE 80-89 MM HG: ICD-10-PCS | Mod: CPTII,S$GLB,, | Performed by: INTERNAL MEDICINE

## 2023-03-06 PROCEDURE — 3008F PR BODY MASS INDEX (BMI) DOCUMENTED: ICD-10-PCS | Mod: CPTII,S$GLB,, | Performed by: INTERNAL MEDICINE

## 2023-03-06 PROCEDURE — 99999 PR PBB SHADOW E&M-EST. PATIENT-LVL IV: ICD-10-PCS | Mod: PBBFAC,,, | Performed by: INTERNAL MEDICINE

## 2023-03-06 PROCEDURE — 80053 COMPREHEN METABOLIC PANEL: CPT | Performed by: INTERNAL MEDICINE

## 2023-03-06 PROCEDURE — 99999 PR PBB SHADOW E&M-EST. PATIENT-LVL IV: CPT | Mod: PBBFAC,,, | Performed by: INTERNAL MEDICINE

## 2023-03-06 PROCEDURE — 99396 PREV VISIT EST AGE 40-64: CPT | Mod: S$GLB,,, | Performed by: INTERNAL MEDICINE

## 2023-03-06 PROCEDURE — 80061 LIPID PANEL: CPT | Performed by: INTERNAL MEDICINE

## 2023-03-06 PROCEDURE — 84443 ASSAY THYROID STIM HORMONE: CPT | Performed by: INTERNAL MEDICINE

## 2023-03-06 PROCEDURE — 3074F SYST BP LT 130 MM HG: CPT | Mod: CPTII,S$GLB,, | Performed by: INTERNAL MEDICINE

## 2023-03-06 PROCEDURE — 84153 ASSAY OF PSA TOTAL: CPT | Performed by: INTERNAL MEDICINE

## 2023-03-06 PROCEDURE — 1160F PR REVIEW ALL MEDS BY PRESCRIBER/CLIN PHARMACIST DOCUMENTED: ICD-10-PCS | Mod: CPTII,S$GLB,, | Performed by: INTERNAL MEDICINE

## 2023-03-06 PROCEDURE — 1160F RVW MEDS BY RX/DR IN RCRD: CPT | Mod: CPTII,S$GLB,, | Performed by: INTERNAL MEDICINE

## 2023-03-06 PROCEDURE — 87389 HIV-1 AG W/HIV-1&-2 AB AG IA: CPT | Performed by: INTERNAL MEDICINE

## 2023-03-06 PROCEDURE — 99396 PR PREVENTIVE VISIT,EST,40-64: ICD-10-PCS | Mod: S$GLB,,, | Performed by: INTERNAL MEDICINE

## 2023-03-06 PROCEDURE — 83036 HEMOGLOBIN GLYCOSYLATED A1C: CPT | Performed by: INTERNAL MEDICINE

## 2023-03-06 PROCEDURE — 1159F MED LIST DOCD IN RCRD: CPT | Mod: CPTII,S$GLB,, | Performed by: INTERNAL MEDICINE

## 2023-03-06 PROCEDURE — 3079F DIAST BP 80-89 MM HG: CPT | Mod: CPTII,S$GLB,, | Performed by: INTERNAL MEDICINE

## 2023-03-06 PROCEDURE — 3074F PR MOST RECENT SYSTOLIC BLOOD PRESSURE < 130 MM HG: ICD-10-PCS | Mod: CPTII,S$GLB,, | Performed by: INTERNAL MEDICINE

## 2023-03-06 PROCEDURE — 3008F BODY MASS INDEX DOCD: CPT | Mod: CPTII,S$GLB,, | Performed by: INTERNAL MEDICINE

## 2023-03-06 PROCEDURE — 1159F PR MEDICATION LIST DOCUMENTED IN MEDICAL RECORD: ICD-10-PCS | Mod: CPTII,S$GLB,, | Performed by: INTERNAL MEDICINE

## 2023-03-06 RX ORDER — ROSUVASTATIN CALCIUM 20 MG/1
20 TABLET, COATED ORAL DAILY
Qty: 90 TABLET | Refills: 3 | Status: CANCELLED | OUTPATIENT
Start: 2023-03-06 | End: 2024-03-05

## 2023-03-06 NOTE — PROGRESS NOTES
"Subjective:       Patient ID: Valentin Howell is a 62 y.o. male.    Chief Complaint: Annual Exam and leg numbness    Here for annual exam    Worsening low back pain with accompanying bilateral leg pain at night, proxima; lateral thigh. Also when standing for long periods of time he will develop right lateral and sometimes left lateral calf, described as so severe he could stick himself with a pin and not feel it. Calf pain when walking up steps.    4 episodes of tachycardia since last visit. Apple watch confirmed sinus. Each event involved Cialis.     ### HLD ###  Lipitor 20mg   Pt is tolerating statin without frequent muscle cramps or diffuse myalgias or weakness.    ### Schwannoma ###  MRI brain c/ and s/ done for asymmetrical hearing loss:Focal enhancement within the basal turn of the left cochlea, suspicious for labyrinthitis or possible small intra-cochlear schwannoma.   Initially with some mild hearing loss. Vertigo episodes starting 2022  [facility] 3/21/23 ENT Dr Avilez writes "Discussed 2 year follow up with MRI. An at-home trial with Cawthorne exercises  MRI brain due 03/2024      ### GERD ###  Long standing hx  GI Dr Bond  Last EGD:   3/2023 Reports symptoms well controlled    ### lumbar DJD ###  Low back pain for several years. Pain free for first few hours of the day but simply with ADLs develops low back pain, band across lumbar region, 8/10, non-radiating. Denies associated numbness/tingling of ext, weakness of LE, saddle anesthesia, or bowel/bladder incontinence. Also notes that pain is much worse when rising after sitting for an hour. He denies any alleviating factors. Takes occasional NSAID             Review of Systems   Constitutional:  Negative for appetite change, chills, fever and unexpected weight change.   HENT:  Negative for hearing loss, sore throat and trouble swallowing.    Eyes:  Negative for visual disturbance.   Respiratory:  Negative for cough, chest tightness and shortness of breath.  " "  Cardiovascular:  Negative for chest pain and leg swelling.   Gastrointestinal:  Negative for abdominal pain, blood in stool, constipation, diarrhea, nausea and vomiting.   Endocrine: Negative for polydipsia and polyuria.   Genitourinary:  Negative for decreased urine volume, difficulty urinating, dysuria, frequency and urgency.   Musculoskeletal:  Positive for arthralgias, back pain and gait problem.   Skin:  Negative for rash.   Neurological:  Negative for dizziness and numbness.   Psychiatric/Behavioral:  The patient is not nervous/anxious.      Objective:      Vitals:    03/06/23 1024 03/06/23 1201   BP: (!) 122/92 124/80   Pulse: 60    SpO2: 98%    Weight: 107.4 kg (236 lb 12.4 oz)    Height: 6' 5" (1.956 m)       Physical Exam  Vitals and nursing note reviewed.   Constitutional:       General: He is not in acute distress.     Appearance: Normal appearance. He is well-developed.   HENT:      Head: Normocephalic and atraumatic.      Mouth/Throat:      Pharynx: No oropharyngeal exudate.   Eyes:      General: No scleral icterus.     Conjunctiva/sclera: Conjunctivae normal.      Pupils: Pupils are equal, round, and reactive to light.   Neck:      Thyroid: No thyromegaly.   Cardiovascular:      Rate and Rhythm: Normal rate and regular rhythm.      Heart sounds: Normal heart sounds. No murmur heard.  Pulmonary:      Effort: Pulmonary effort is normal.      Breath sounds: Normal breath sounds. No wheezing or rales.   Abdominal:      General: There is no distension.   Musculoskeletal:         General: No tenderness.   Lymphadenopathy:      Cervical: No cervical adenopathy.   Skin:     General: Skin is warm and dry.   Neurological:      Mental Status: He is alert and oriented to person, place, and time.   Psychiatric:         Behavior: Behavior normal.       Assessment:       1. Annual physical exam    2. Encounter for screening for HIV    3. Lumbar radiculopathy    4. SVT (supraventricular tachycardia)    5. " Left-sided acoustic neuroma          Plan:       Valentin was seen today for annual exam and leg numbness.    Diagnoses and all orders for this visit:    Annual physical exam  -     CBC Auto Differential; Future  -     Comprehensive Metabolic Panel; Future  -     Hemoglobin A1C; Future  -     Lipid Panel; Future  -     TSH; Future  -     PSA, Screening; Future    Encounter for screening for HIV  -     HIV 1/2 Ag/Ab (4th Gen); Future    Lumbar radiculopathy  -     Ambulatory referral/consult to [facility]; Future    SVT (supraventricular tachycardia)   F/u cardiology   Controlled and asymptomatic.  Continue current Rx regimen.    Left-sided acoustic neuroma              Francisco Huynh MD  Internal Medicine-[facility]        Side effects of medication(s) were discussed in detail and patient voiced understanding.  Patient will call back for any issues or complications.

## 2023-03-07 NOTE — PROGRESS NOTES
I have reviewed your recent lab work.  Your labs look good, except:    1) Cholesterol has steadily climbed. Are you taking lipitor everyday?    Your kidney function is normal.  Your liver studies are normal.  You do not have diabetes.  Your thyroid studies are normal.  Your prostate cancer screening test is normal.  Your screening for hepatitis C and HIV were both normal.        If you have any questions or concerns about particular lab results please notify me via [facility] messaging or by calling our office at [phone].    Respectfully,  Francisco Huynh

## 2023-03-13 ENCOUNTER — TELEPHONE (OUTPATIENT)
Dept: INTERNAL MEDICINE | Facility: CLINIC | Age: 63
End: 2023-03-13

## 2023-03-13 ENCOUNTER — OFFICE VISIT (OUTPATIENT)
Dept: SPINE | Facility: CLINIC | Age: 63
End: 2023-03-13
Attending: INTERNAL MEDICINE
Payer: COMMERCIAL

## 2023-03-13 ENCOUNTER — CLINICAL SUPPORT (OUTPATIENT)
Dept: INTERNAL MEDICINE | Facility: CLINIC | Age: 63
End: 2023-03-13
Payer: COMMERCIAL

## 2023-03-13 VITALS — DIASTOLIC BLOOD PRESSURE: 78 MMHG | SYSTOLIC BLOOD PRESSURE: 128 MMHG | HEART RATE: 56 BPM | OXYGEN SATURATION: 98 %

## 2023-03-13 VITALS
HEIGHT: 77 IN | HEART RATE: 55 BPM | SYSTOLIC BLOOD PRESSURE: 132 MMHG | DIASTOLIC BLOOD PRESSURE: 85 MMHG | BODY MASS INDEX: 27.85 KG/M2 | WEIGHT: 235.88 LBS

## 2023-03-13 DIAGNOSIS — M54.16 LUMBAR RADICULOPATHY: ICD-10-CM

## 2023-03-13 DIAGNOSIS — M51.36 DEGENERATIVE DISC DISEASE, LUMBAR: Primary | ICD-10-CM

## 2023-03-13 PROCEDURE — 3079F DIAST BP 80-89 MM HG: CPT | Mod: CPTII,S$GLB,, | Performed by: ANESTHESIOLOGY

## 2023-03-13 PROCEDURE — 1160F PR REVIEW ALL MEDS BY PRESCRIBER/CLIN PHARMACIST DOCUMENTED: ICD-10-PCS | Mod: CPTII,S$GLB,, | Performed by: ANESTHESIOLOGY

## 2023-03-13 PROCEDURE — 3008F BODY MASS INDEX DOCD: CPT | Mod: CPTII,S$GLB,, | Performed by: ANESTHESIOLOGY

## 2023-03-13 PROCEDURE — 3044F HG A1C LEVEL LT 7.0%: CPT | Mod: CPTII,S$GLB,, | Performed by: ANESTHESIOLOGY

## 2023-03-13 PROCEDURE — 3075F SYST BP GE 130 - 139MM HG: CPT | Mod: CPTII,S$GLB,, | Performed by: ANESTHESIOLOGY

## 2023-03-13 PROCEDURE — 99999 PR PBB SHADOW E&M-EST. PATIENT-LVL IV: CPT | Mod: PBBFAC,,, | Performed by: ANESTHESIOLOGY

## 2023-03-13 PROCEDURE — 99999 PR PBB SHADOW E&M-EST. PATIENT-LVL III: CPT | Mod: PBBFAC,,,

## 2023-03-13 PROCEDURE — 3075F PR MOST RECENT SYSTOLIC BLOOD PRESS GE 130-139MM HG: ICD-10-PCS | Mod: CPTII,S$GLB,, | Performed by: ANESTHESIOLOGY

## 2023-03-13 PROCEDURE — 1159F MED LIST DOCD IN RCRD: CPT | Mod: CPTII,S$GLB,, | Performed by: ANESTHESIOLOGY

## 2023-03-13 PROCEDURE — 99204 OFFICE O/P NEW MOD 45 MIN: CPT | Mod: S$GLB,,, | Performed by: ANESTHESIOLOGY

## 2023-03-13 PROCEDURE — 1159F PR MEDICATION LIST DOCUMENTED IN MEDICAL RECORD: ICD-10-PCS | Mod: CPTII,S$GLB,, | Performed by: ANESTHESIOLOGY

## 2023-03-13 PROCEDURE — 99999 PR PBB SHADOW E&M-EST. PATIENT-LVL III: ICD-10-PCS | Mod: PBBFAC,,,

## 2023-03-13 PROCEDURE — 3044F PR MOST RECENT HEMOGLOBIN A1C LEVEL <7.0%: ICD-10-PCS | Mod: CPTII,S$GLB,, | Performed by: ANESTHESIOLOGY

## 2023-03-13 PROCEDURE — 99204 PR OFFICE/OUTPT VISIT, NEW, LEVL IV, 45-59 MIN: ICD-10-PCS | Mod: S$GLB,,, | Performed by: ANESTHESIOLOGY

## 2023-03-13 PROCEDURE — 3079F PR MOST RECENT DIASTOLIC BLOOD PRESSURE 80-89 MM HG: ICD-10-PCS | Mod: CPTII,S$GLB,, | Performed by: ANESTHESIOLOGY

## 2023-03-13 PROCEDURE — 1160F RVW MEDS BY RX/DR IN RCRD: CPT | Mod: CPTII,S$GLB,, | Performed by: ANESTHESIOLOGY

## 2023-03-13 PROCEDURE — 99999 PR PBB SHADOW E&M-EST. PATIENT-LVL IV: ICD-10-PCS | Mod: PBBFAC,,, | Performed by: ANESTHESIOLOGY

## 2023-03-13 PROCEDURE — 3008F PR BODY MASS INDEX (BMI) DOCUMENTED: ICD-10-PCS | Mod: CPTII,S$GLB,, | Performed by: ANESTHESIOLOGY

## 2023-03-13 NOTE — TELEPHONE ENCOUNTER
Valentin Howell 62 y.o. male is here for Blood Pressure check. in person    Manual Blood pressure reading was  128/78, Pulse 56. (Checked at the end of the visit)    If high, was it repeated after 15 minutes? no    Pt's Home blood pressure machine read in office NO, Pulse No.     Diagnosed with Hypertension no.    Patient took blood pressure medication today no.  Last dose of blood pressure medication was taken at No. Patient took Metoprolol  25 mg daily PRN palpitations ( Last dose 3-4 weeks ago).     All Medications and OTC medication updated yes    Does patient have record of home blood pressure readings / Blood Pressure Log no. States does not have a BP Machine  Does the pt have any complaints today in regards to their blood pressure medication? no. Complains of None . Patient is asymptomatic.   Were you sitting still for 5-10 minutes prior to taking your Blood pressure? yes Today in [facility]  Has your blood pressure monitor ever been checked? noStates does not have a BP Machine When was last time we checked your blood pressure monitor? No  Updated vitals yes  Follow up date is No F/U appointment.     Dr. Mcnally notified.     Creatinine   Date Value Ref Range Status   03/06/2023 0.9 0.5 - 1.4 mg/dL Final     Sodium   Date Value Ref Range Status   03/06/2023 138 136 - 145 mmol/L Final     Potassium   Date Value Ref Range Status   03/06/2023 4.2 3.5 - 5.1 mmol/L Final

## 2023-03-13 NOTE — PROGRESS NOTES
PCP: Francisco Mcnally MD    REFERRING PHYSICIAN: Francisco Mcnally MD    CHIEF COMPLAINT: back pain and leg numbness    Original HISTORY OF PRESENT ILLNESS: Valentin Howell presents to the clinic for the evaluation of the above pain. The pain started over 10 years ago.    Original Pain Description:  The pain is located in the low back and does not radiate. It is associated with numbness of the right lateral thigh and numbness of the left lateral calf. He states both of these come on suddenly while standing for longer than 1-2 hours but do not cause any pain. The back pain is described as aching and dull. Exacerbating factors: Standing, Bending, Extension, Flexing, Lifting, and Getting out of bed/chair. Mitigating factors laying down, medications (voltaren gel), and rest. Symptoms interfere with daily activity, sleeping, and work. The patient feels like symptoms have been worsening. Patient denies night fever/night sweats, urinary incontinence, bowel incontinence, and significant motor weakness. This is affecting his daily life as he has trouble standing for long durations at work.    Original PAIN SCORES:  Best: Pain is 2  Worst: Pain is 8  Current: Pain is 4    INTERVAL HISTORY:     6 weeks of Conservative therapy:  PT: December 2021 - January 2022   Chiro: none  HEP: none currently, states that most recent physical therapy did not help symptoms      Treatments / Medications: (Ice/Heat/NSAIDS/APAP/etc):  Voltaren gel      Interventional Pain Procedures: (Previous injections)  None    Past Medical History:   Diagnosis Date    Acid reflux     Hypercholesteremia      Past Surgical History:   Procedure Laterality Date    ABDOMINAL HERNIA REPAIR      HEEL SPUR SURGERY Right     HERNIA REPAIR Bilateral     TONSILLECTOMY       Social History     Socioeconomic History    Marital status:    Tobacco Use    Smoking status: Never    Smokeless tobacco: Never   Substance and Sexual Activity    Alcohol use: Not  "Currently     Alcohol/week: 0.0 standard drinks    Drug use: No    Sexual activity: Yes     Partners: Female     Family History   Problem Relation Age of Onset    Hyperlipidemia Father        Review of patient's allergies indicates:  No Known Allergies    Current Outpatient Medications   Medication Sig    aspirin 81 MG Chew Take 1 tablet (81 mg total) by mouth once daily.    atorvastatin (LIPITOR) 20 MG tablet Take 1 tablet (20 mg total) by mouth once daily.    famotidine (PEPCID) 10 MG tablet Take 1 tablet (10 mg total) by mouth 2 (two) times daily as needed.    metoprolol tartrate (LOPRESSOR) 25 MG tablet Take 1 tablet (25 mg total) by mouth daily as needed (For palpitations).    tadalafiL (CIALIS) 10 MG tablet Take 1 tablet (10 mg total) by mouth once daily. (Patient not taking: Reported on 3/6/2023)     No current facility-administered medications for this visit.       ROS:  GENERAL: No fever. No chills. No fatigue. Denies weight loss. Denies weight gain.  HEENT: Denies headaches. Denies vision change. Denies eye pain. Denies double vision. Denies ear pain.   CV: Denies chest pain.   PULM: Denies of shortness of breath.  GI: Denies constipation. No diarrhea. No abdominal pain. Denies nausea. Denies vomiting. No blood in stool.  HEME: Denies bleeding problems.  : Denies urgency. No painful urination. No blood in urine.  MS: Denies joint stiffness. Denies joint swelling.  Positive back pain.  SKIN: Denies rash.   NEURO: Denies seizures. No weakness. Positive numbness.  PSYCH:  Difficulty sleeping. No anxiety. Denies depression. No suicidal thoughts.       VITALS:   Vitals:    03/13/23 0817   BP: 132/85   Pulse: (!) 55   Weight: 107 kg (235 lb 14.3 oz)   Height: 6' 5" (1.956 m)   PainSc:   4         PHYSICAL EXAM:   GENERAL: Well appearing, in no acute distress, alert and oriented x3.  PSYCH:  Mood and affect appropriate.  SKIN: Skin color, texture, turgor normal, no rashes or lesions.  HEENT:  Normocephalic, " atraumatic. Cranial nerves grossly intact.  NECK: No pain to palpation over the cervical paraspinous muscles. No pain to palpation over facets. No pain with neck flexion, extension, or lateral flexion.   PULM: No evidence of respiratory difficulty, symmetric chest rise.  GI:  Non-distended  BACK: Normal range of motion. No pain to palpation over the spinous processes. No pain to palpation over facet joints. There is no pain with palpation over the sacroiliac joints bilaterally. Straight leg raising in the supine position is negative to radicular pain.   EXTREMITIES: No deformities, edema, or skin discoloration.   MUSCULOSKELETAL: Shoulder, hip, and knee provocative maneuvers are negative. Bilateral upper and lower extremity strength is normal and symmetric. No atrophy is noted. No trochanteric bursa tenderness. No tenderness of lumbar spinous processes or SI joints. No pain with facet loading. Bilateral SLR negative. Muscle strength of lower extremities is equal and bilateral. Loss of sensation to light touch in right lateral thigh and left lateral calf.  NEURO: Sensation is equal and appropriate bilaterally. Bilateral upper and lower extremity coordination and muscle stretch reflexes are physiologic and symmetric. Plantar response are downgoing.   GAIT: normal.          ASSESSMENT: 62 y.o. year old male with pain, consistent with:     Encounter Diagnoses   Name Primary?    Lumbar radiculopathy     Degenerative disc disease, lumbar Yes       DISCUSSION: Mr. Howell is a very pleasant  in [place] in overall very good health. He has 3 children who are all physicians. He comes to us with low back pain intermittently over time with bending and lifting that has been increasing in frequency and duration. He notes difficulty rising from a seated position and develops sensory changes in the lateral legs with prolonged standing. We have no advanced imaging on evaluation. Exam shows only sensory changes in  the lateral thighs. No red flag findings on discussion or exam.       PLAN:  Schedule MRI lumbar spine   Continue voltaren gel as needed for back pain.  Avoid heavy lifting as able given his type of work  Follow up after MRI to discuss imaging and treatment options    I would like to thank Francisco Mcnally MD for the opportunity to assist in the care of this patient. We had a very nice visit and I look forward to continuing his care. Please let me know if I can be of further assistance.     Adri Dubose  03/13/2023

## 2023-04-15 ENCOUNTER — HOSPITAL ENCOUNTER (OUTPATIENT)
Dept: RADIOLOGY | Facility: OTHER | Age: 63
Discharge: HOME | End: 2023-04-15
Attending: STUDENT IN AN ORGANIZED HEALTH CARE EDUCATION/TRAINING PROGRAM
Payer: COMMERCIAL

## 2023-04-15 DIAGNOSIS — M51.36 DEGENERATIVE DISC DISEASE, LUMBAR: ICD-10-CM

## 2023-04-15 PROCEDURE — 72148 MRI LUMBAR SPINE WITHOUT CONTRAST: ICD-10-PCS | Mod: 26,,, | Performed by: RADIOLOGY

## 2023-04-15 PROCEDURE — 72148 MRI LUMBAR SPINE W/O DYE: CPT | Mod: 26,,, | Performed by: RADIOLOGY

## 2023-04-15 PROCEDURE — 72148 MRI LUMBAR SPINE W/O DYE: CPT | Mod: TC

## 2023-08-03 ENCOUNTER — PATIENT MESSAGE (OUTPATIENT)
Dept: INTERNAL MEDICINE | Facility: CLINIC | Age: 63
End: 2023-08-03
Payer: COMMERCIAL

## 2023-08-03 ENCOUNTER — PATIENT MESSAGE (OUTPATIENT)
Dept: CARDIOLOGY | Facility: CLINIC | Age: 63
End: 2023-08-03
Payer: COMMERCIAL

## 2023-08-03 DIAGNOSIS — Z82.49 FHX: SVT (SUPRAVENTRICULAR TACHYCARDIA): Primary | ICD-10-CM

## 2023-08-05 ENCOUNTER — HOSPITAL ENCOUNTER (OUTPATIENT)
Facility: HOSPITAL | Age: 63
Discharge: HOME | End: 2023-08-07
Attending: EMERGENCY MEDICINE | Admitting: INTERNAL MEDICINE
Payer: COMMERCIAL

## 2023-08-05 DIAGNOSIS — R07.89 CHEST DISCOMFORT: ICD-10-CM

## 2023-08-05 DIAGNOSIS — I47.10 SVT (SUPRAVENTRICULAR TACHYCARDIA): ICD-10-CM

## 2023-08-05 DIAGNOSIS — R07.9 CHEST PAIN: Primary | ICD-10-CM

## 2023-08-05 LAB
ALBUMIN SERPL BCP-MCNC: 4.3 G/DL (ref 3.5–5.2)
ALP SERPL-CCNC: 67 U/L (ref 55–135)
ALT SERPL W/O P-5'-P-CCNC: 12 U/L (ref 10–44)
ANION GAP SERPL CALC-SCNC: 11 MMOL/L (ref 8–16)
AST SERPL-CCNC: 16 U/L (ref 10–40)
BASOPHILS # BLD AUTO: 0.03 K/UL (ref 0–0.2)
BASOPHILS NFR BLD: 0.3 % (ref 0–1.9)
BILIRUB SERPL-MCNC: 0.6 MG/DL (ref 0.1–1)
BNP SERPL-MCNC: 32 PG/ML (ref 0–99)
BUN SERPL-MCNC: 15 MG/DL (ref 8–23)
CALCIUM SERPL-MCNC: 9.7 MG/DL (ref 8.7–10.5)
CHLORIDE SERPL-SCNC: 105 MMOL/L (ref 95–110)
CO2 SERPL-SCNC: 23 MMOL/L (ref 23–29)
CREAT SERPL-MCNC: 1.1 MG/DL (ref 0.5–1.4)
DIFFERENTIAL METHOD: NORMAL
EOSINOPHIL # BLD AUTO: 0.2 K/UL (ref 0–0.5)
EOSINOPHIL NFR BLD: 1.8 % (ref 0–8)
ERYTHROCYTE [DISTWIDTH] IN BLOOD BY AUTOMATED COUNT: 12.6 % (ref 11.5–14.5)
EST. GFR  (NO RACE VARIABLE): >60 ML/MIN/1.73 M^2
GLUCOSE SERPL-MCNC: 95 MG/DL (ref 70–110)
HCT VFR BLD AUTO: 44.4 % (ref 40–54)
HCV AB SERPL QL IA: NORMAL
HGB BLD-MCNC: 15.3 G/DL (ref 14–18)
IMM GRANULOCYTES # BLD AUTO: 0.02 K/UL (ref 0–0.04)
IMM GRANULOCYTES NFR BLD AUTO: 0.2 % (ref 0–0.5)
INR PPP: 1 (ref 0.8–1.2)
LYMPHOCYTES # BLD AUTO: 2.4 K/UL (ref 1–4.8)
LYMPHOCYTES NFR BLD: 25.1 % (ref 18–48)
MCH RBC QN AUTO: 30.2 PG (ref 27–31)
MCHC RBC AUTO-ENTMCNC: 34.5 G/DL (ref 32–36)
MCV RBC AUTO: 88 FL (ref 82–98)
MONOCYTES # BLD AUTO: 0.8 K/UL (ref 0.3–1)
MONOCYTES NFR BLD: 8.1 % (ref 4–15)
NEUTROPHILS # BLD AUTO: 6.1 K/UL (ref 1.8–7.7)
NEUTROPHILS NFR BLD: 64.5 % (ref 38–73)
NRBC BLD-RTO: 0 /100 WBC
PLATELET # BLD AUTO: 207 K/UL (ref 150–450)
PMV BLD AUTO: 11.1 FL (ref 9.2–12.9)
POC CARDIAC TROPONIN I: 0 NG/ML (ref 0–0.08)
POTASSIUM SERPL-SCNC: 4.2 MMOL/L (ref 3.5–5.1)
PROT SERPL-MCNC: 7.7 G/DL (ref 6–8.4)
PROTHROMBIN TIME: 10.7 SEC (ref 9–12.5)
RBC # BLD AUTO: 5.07 M/UL (ref 4.6–6.2)
SAMPLE: NORMAL
SODIUM SERPL-SCNC: 139 MMOL/L (ref 136–145)
TROPONIN I SERPL DL<=0.01 NG/ML-MCNC: 0.01 NG/ML (ref 0–0.03)
WBC # BLD AUTO: 9.48 K/UL (ref 3.9–12.7)

## 2023-08-05 PROCEDURE — G0378 HOSPITAL OBSERVATION PER HR: HCPCS

## 2023-08-05 PROCEDURE — 93005 ELECTROCARDIOGRAM TRACING: CPT

## 2023-08-05 PROCEDURE — 36415 COLL VENOUS BLD VENIPUNCTURE: CPT | Performed by: INTERNAL MEDICINE

## 2023-08-05 PROCEDURE — 85610 PROTHROMBIN TIME: CPT | Performed by: INTERNAL MEDICINE

## 2023-08-05 PROCEDURE — 99285 EMERGENCY DEPT VISIT HI MDM: CPT | Mod: 25

## 2023-08-05 PROCEDURE — 93010 EKG 12-LEAD: ICD-10-PCS | Mod: ,,, | Performed by: INTERNAL MEDICINE

## 2023-08-05 PROCEDURE — 86803 HEPATITIS C AB TEST: CPT | Performed by: PHYSICIAN ASSISTANT

## 2023-08-05 PROCEDURE — 84484 ASSAY OF TROPONIN QUANT: CPT | Mod: 91 | Performed by: PHYSICIAN ASSISTANT

## 2023-08-05 PROCEDURE — 36415 COLL VENOUS BLD VENIPUNCTURE: CPT

## 2023-08-05 PROCEDURE — 83880 ASSAY OF NATRIURETIC PEPTIDE: CPT | Performed by: PHYSICIAN ASSISTANT

## 2023-08-05 PROCEDURE — 99223 PR INITIAL HOSPITAL CARE,LEVL III: ICD-10-PCS | Mod: ,,,

## 2023-08-05 PROCEDURE — 25000003 PHARM REV CODE 250: Performed by: PHYSICIAN ASSISTANT

## 2023-08-05 PROCEDURE — 93010 ELECTROCARDIOGRAM REPORT: CPT | Mod: ,,, | Performed by: INTERNAL MEDICINE

## 2023-08-05 PROCEDURE — 93010 ELECTROCARDIOGRAM REPORT: CPT | Mod: 76,,, | Performed by: INTERNAL MEDICINE

## 2023-08-05 PROCEDURE — 99223 1ST HOSP IP/OBS HIGH 75: CPT | Mod: ,,,

## 2023-08-05 PROCEDURE — 85025 COMPLETE CBC W/AUTO DIFF WBC: CPT | Performed by: PHYSICIAN ASSISTANT

## 2023-08-05 PROCEDURE — 84484 ASSAY OF TROPONIN QUANT: CPT | Mod: 91

## 2023-08-05 PROCEDURE — 80053 COMPREHEN METABOLIC PANEL: CPT | Performed by: PHYSICIAN ASSISTANT

## 2023-08-05 RX ORDER — NAPROXEN SODIUM 220 MG/1
81 TABLET, FILM COATED ORAL DAILY
Status: DISCONTINUED | OUTPATIENT
Start: 2023-08-06 | End: 2023-08-06

## 2023-08-05 RX ORDER — IBUPROFEN 200 MG
16 TABLET ORAL
Status: DISCONTINUED | OUTPATIENT
Start: 2023-08-05 | End: 2023-08-07 | Stop reason: HOSPADM

## 2023-08-05 RX ORDER — ROSUVASTATIN CALCIUM 10 MG/1
10 TABLET, COATED ORAL NIGHTLY
COMMUNITY
End: 2023-08-16 | Stop reason: SDUPTHER

## 2023-08-05 RX ORDER — ASPIRIN 325 MG
325 TABLET ORAL
Status: COMPLETED | OUTPATIENT
Start: 2023-08-05 | End: 2023-08-05

## 2023-08-05 RX ORDER — OMEPRAZOLE 40 MG/1
40 CAPSULE, DELAYED RELEASE ORAL 2 TIMES DAILY
COMMUNITY

## 2023-08-05 RX ORDER — SODIUM CHLORIDE 0.9 % (FLUSH) 0.9 %
5 SYRINGE (ML) INJECTION
Status: DISCONTINUED | OUTPATIENT
Start: 2023-08-05 | End: 2023-08-07 | Stop reason: HOSPADM

## 2023-08-05 RX ORDER — NALOXONE HCL 0.4 MG/ML
0.02 VIAL (ML) INJECTION
Status: DISCONTINUED | OUTPATIENT
Start: 2023-08-05 | End: 2023-08-07 | Stop reason: HOSPADM

## 2023-08-05 RX ORDER — IBUPROFEN 200 MG
24 TABLET ORAL
Status: DISCONTINUED | OUTPATIENT
Start: 2023-08-05 | End: 2023-08-07 | Stop reason: HOSPADM

## 2023-08-05 RX ORDER — ONDANSETRON 4 MG/1
4 TABLET, ORALLY DISINTEGRATING ORAL EVERY 6 HOURS PRN
Status: DISCONTINUED | OUTPATIENT
Start: 2023-08-05 | End: 2023-08-07 | Stop reason: HOSPADM

## 2023-08-05 RX ORDER — MAG HYDROX/ALUMINUM HYD/SIMETH 200-200-20
30 SUSPENSION, ORAL (FINAL DOSE FORM) ORAL 4 TIMES DAILY PRN
Status: DISCONTINUED | OUTPATIENT
Start: 2023-08-05 | End: 2023-08-07 | Stop reason: HOSPADM

## 2023-08-05 RX ORDER — ACETAMINOPHEN 325 MG/1
650 TABLET ORAL EVERY 4 HOURS PRN
Status: DISCONTINUED | OUTPATIENT
Start: 2023-08-05 | End: 2023-08-07 | Stop reason: HOSPADM

## 2023-08-05 RX ORDER — SIMETHICONE 80 MG
1 TABLET,CHEWABLE ORAL 4 TIMES DAILY PRN
Status: DISCONTINUED | OUTPATIENT
Start: 2023-08-05 | End: 2023-08-07 | Stop reason: HOSPADM

## 2023-08-05 RX ORDER — ACETAMINOPHEN 500 MG
1000 TABLET ORAL EVERY 8 HOURS PRN
Status: DISCONTINUED | OUTPATIENT
Start: 2023-08-05 | End: 2023-08-07 | Stop reason: HOSPADM

## 2023-08-05 RX ORDER — IPRATROPIUM BROMIDE AND ALBUTEROL SULFATE 2.5; .5 MG/3ML; MG/3ML
3 SOLUTION RESPIRATORY (INHALATION) EVERY 4 HOURS PRN
Status: DISCONTINUED | OUTPATIENT
Start: 2023-08-05 | End: 2023-08-07 | Stop reason: HOSPADM

## 2023-08-05 RX ORDER — FAMOTIDINE 20 MG/1
20 TABLET, FILM COATED ORAL
Status: COMPLETED | OUTPATIENT
Start: 2023-08-05 | End: 2023-08-05

## 2023-08-05 RX ORDER — SERTRALINE HYDROCHLORIDE 25 MG/1
25 TABLET, FILM COATED ORAL DAILY
COMMUNITY

## 2023-08-05 RX ORDER — FAMOTIDINE 20 MG/1
20 TABLET, FILM COATED ORAL 2 TIMES DAILY PRN
Status: DISCONTINUED | OUTPATIENT
Start: 2023-08-05 | End: 2023-08-07 | Stop reason: HOSPADM

## 2023-08-05 RX ORDER — MAG HYDROX/ALUMINUM HYD/SIMETH 200-200-20
5 SUSPENSION, ORAL (FINAL DOSE FORM) ORAL
Status: COMPLETED | OUTPATIENT
Start: 2023-08-05 | End: 2023-08-05

## 2023-08-05 RX ORDER — PROCHLORPERAZINE EDISYLATE 5 MG/ML
5 INJECTION INTRAMUSCULAR; INTRAVENOUS EVERY 6 HOURS PRN
Status: DISCONTINUED | OUTPATIENT
Start: 2023-08-05 | End: 2023-08-07 | Stop reason: HOSPADM

## 2023-08-05 RX ORDER — ATORVASTATIN CALCIUM 40 MG/1
40 TABLET, FILM COATED ORAL DAILY
Status: DISCONTINUED | OUTPATIENT
Start: 2023-08-06 | End: 2023-08-06

## 2023-08-05 RX ORDER — GLUCAGON 1 MG
1 KIT INJECTION
Status: DISCONTINUED | OUTPATIENT
Start: 2023-08-05 | End: 2023-08-07 | Stop reason: HOSPADM

## 2023-08-05 RX ORDER — METOPROLOL TARTRATE 25 MG/1
25 TABLET, FILM COATED ORAL DAILY PRN
Status: DISCONTINUED | OUTPATIENT
Start: 2023-08-05 | End: 2023-08-07 | Stop reason: HOSPADM

## 2023-08-05 RX ORDER — TALC
6 POWDER (GRAM) TOPICAL NIGHTLY PRN
Status: DISCONTINUED | OUTPATIENT
Start: 2023-08-05 | End: 2023-08-07 | Stop reason: HOSPADM

## 2023-08-05 RX ORDER — NITROGLYCERIN 0.4 MG/1
0.4 TABLET SUBLINGUAL EVERY 5 MIN PRN
Status: DISCONTINUED | OUTPATIENT
Start: 2023-08-05 | End: 2023-08-07 | Stop reason: HOSPADM

## 2023-08-05 RX ORDER — BISACODYL 10 MG
10 SUPPOSITORY, RECTAL RECTAL DAILY PRN
Status: DISCONTINUED | OUTPATIENT
Start: 2023-08-05 | End: 2023-08-07 | Stop reason: HOSPADM

## 2023-08-05 RX ORDER — POLYETHYLENE GLYCOL 3350 17 G/17G
17 POWDER, FOR SOLUTION ORAL 2 TIMES DAILY PRN
Status: DISCONTINUED | OUTPATIENT
Start: 2023-08-05 | End: 2023-08-07 | Stop reason: HOSPADM

## 2023-08-05 RX ADMIN — ASPIRIN 325 MG: 325 TABLET ORAL at 12:08

## 2023-08-05 RX ADMIN — ALUMINUM HYDROXIDE, MAGNESIUM HYDROXIDE, AND SIMETHICONE 5 ML: 200; 200; 20 SUSPENSION ORAL at 12:08

## 2023-08-05 RX ADMIN — FAMOTIDINE 20 MG: 20 TABLET, FILM COATED ORAL at 12:08

## 2023-08-05 NOTE — ED PROVIDER NOTES
Encounter Date: 8/5/2023       History     Chief Complaint   Patient presents with    Chest Pain     Hx tachy ,  chest pain , hx tachycardia, vomiting 5 times     62-year-old male with a PMHx of SVT, HLD, acid reflux presents to ED with intermittent chest pain x3 days.  He noticed chest pain while trimming branches.  He had associated dizziness, epigastric abdominal pain, palpitation and several episodes of vomiting.  Symptoms seemed to be related to activity and resolve with rest. This morning he attempted to trim branches again and experienced chest pain.  Both his daughter and his son or cardiologist recommended he presents to the ED for further evaluation.  Denies personal history of CAD.  His father passed away from an MI around the age of 80.  Denies fever, congestion, lower extremity swelling.    The history is provided by the patient.     Review of patient's allergies indicates:  No Known Allergies  Past Medical History:   Diagnosis Date    Acid reflux     Hypercholesteremia     SVT (supraventricular tachycardia)      Past Surgical History:   Procedure Laterality Date    ABDOMINAL HERNIA REPAIR      HEEL SPUR SURGERY Right     HERNIA REPAIR Bilateral     TONSILLECTOMY       Family History   Problem Relation Age of Onset    Hyperlipidemia Father      Social History     Tobacco Use    Smoking status: Never    Smokeless tobacco: Never   Substance Use Topics    Alcohol use: Not Currently     Alcohol/week: 0.0 standard drinks of alcohol    Drug use: No     Review of Systems   Constitutional:  Positive for diaphoresis. Negative for chills and fever.   HENT:  Negative for congestion and sore throat.    Respiratory:  Positive for cough (chronic) and shortness of breath.    Cardiovascular:  Positive for chest pain and palpitations. Negative for leg swelling.   Gastrointestinal:  Positive for abdominal pain, diarrhea, nausea and vomiting.   Neurological:  Positive for dizziness.       Physical Exam     Initial Vitals  [08/05/23 1037]   BP Pulse Resp Temp SpO2   120/79 75 18 98.2 °F (36.8 °C) 96 %      MAP       --         Physical Exam    Nursing note and vitals reviewed.  Constitutional: He appears well-developed and well-nourished. He is not diaphoretic. No distress.   HENT:   Head: Normocephalic and atraumatic.   Nose: Nose normal.   Eyes: Conjunctivae and EOM are normal.   Neck: Neck supple.   Cardiovascular:  Normal rate, regular rhythm, normal heart sounds and intact distal pulses.           Pulmonary/Chest: Breath sounds normal. No respiratory distress. He exhibits no tenderness.   Abdominal: There is no abdominal tenderness.   Musculoskeletal:      Cervical back: Neck supple.      Right lower leg: No edema.      Left lower leg: No edema.     Neurological: He is alert and oriented to person, place, and time.   Skin: No rash noted.   Psychiatric: He has a normal mood and affect. Thought content normal.         ED Course   Procedures  Labs Reviewed   HEPATITIS C ANTIBODY    Narrative:     Release to patient->Immediate   CBC W/ AUTO DIFFERENTIAL   COMPREHENSIVE METABOLIC PANEL   TROPONIN I   TROPONIN I   B-TYPE NATRIURETIC PEPTIDE   TROPONIN ISTAT   POCT TROPONIN   POCT TROPONIN        ECG Results              EKG 12-lead (Final result)  Result time 08/06/23 08:16:54      Final result by Interface, Lab In [facility] (08/06/23 08:16:54)                   Narrative:    Test Reason :     Vent. Rate : 056 BPM     Atrial Rate : 056 BPM     P-R Int : 188 ms          QRS Dur : 154 ms      QT Int : 454 ms       P-R-T Axes : 057 005 013 degrees     QTc Int : 438 ms    Sinus bradycardia  Right bundle branch block  Abnormal ECG  When compared with ECG of 05-AUG-2023 11:52,  No significant change was found  Confirmed by Nils ARNOLD MD ([id number]) on 8/6/2023 8:16:46 AM    Referred By: [email or web address]   SELF           Confirmed By:Nils ARNOLD MD                                     EKG 12-lead (Final result)  Result time 08/06/23 08:16:58      Final  result by Interface, Lab In [facility] (08/06/23 08:16:58)                   Narrative:    Test Reason : R07.9,    Vent. Rate : 065 BPM     Atrial Rate : 065 BPM     P-R Int : 176 ms          QRS Dur : 156 ms      QT Int : 426 ms       P-R-T Axes : 053 -07 011 degrees     QTc Int : 443 ms    Normal sinus rhythm  Right bundle branch block  Abnormal ECG  When compared with ECG of 05-AUG-2023 11:33,  No significant change was found  Confirmed by Nils ARNOLD MD ([id number]) on 8/6/2023 8:16:51 AM    Referred By: [email or web address]   SELF           Confirmed By:Nils ARNOLD MD                                     EKG 12-lead (Final result)  Result time 08/06/23 08:10:39      Final result by Interface, Lab In [facility] (08/06/23 08:10:39)                   Narrative:    Test Reason : R07.89,    Vent. Rate : 067 BPM     Atrial Rate : 067 BPM     P-R Int : 178 ms          QRS Dur : 156 ms      QT Int : 432 ms       P-R-T Axes : 064 -04 018 degrees     QTc Int : 456 ms    Normal sinus rhythm  Right bundle branch block  Abnormal ECG  When compared with ECG of 05-AUG-2023 10:27,  No significant change was found  Confirmed by Nils ARNOLD MD ([id number]) on 8/6/2023 8:10:35 AM    Referred By: [email or web address]   SELF           Confirmed By:Nils ARNOLD MD                                     EKG 12-lead (Final result)  Result time 08/06/23 08:11:59      Final result by Interface, Lab In [facility] (08/06/23 08:11:59)                   Narrative:    Test Reason :     Vent. Rate : 074 BPM     Atrial Rate : 074 BPM     P-R Int : 160 ms          QRS Dur : 150 ms      QT Int : 412 ms       P-R-T Axes : 040 -04 -02 degrees     QTc Int : 457 ms    Normal sinus rhythm with sinus arrhythmia  Right bundle branch block  Abnormal ECG  When compared with ECG of 26-JUN-2020 10:13,  Inverted T waves have replaced nonspecific T wave abnormality in Inferior  leads  Confirmed by Nils ARNOLD MD ([id number]) on 8/6/2023 8:11:49 AM    Referred By: [email or web address]   SELF           Confirmed By:Nils ARNOLD MD                                   Imaging Results              X-Ray Chest AP Portable (Final result)  Result time 08/05/23 14:01:24      Final result by Juan Carlos Alvarez MD (08/05/23 14:01:24)                   Impression:      1. Interstitial findings are accentuated by habitus and shallow inspiratory effort, no large focal consolidation.      Electronically signed by: Juan Carlos Alvarez MD  Date:    08/05/2023  Time:    14:01               Narrative:    EXAMINATION:  XR CHEST AP PORTABLE    CLINICAL HISTORY:  Chest Pain;    TECHNIQUE:  Single frontal view of the chest was performed.    COMPARISON:  03/07/2016    FINDINGS:  The cardiomediastinal silhouette is not enlarged, magnified by technique..  There is no pleural effusion.  The trachea is midline.  The lungs are symmetrically expanded bilaterally with mildly coarse interstitial attenuation accentuated by habitus.  There is bandlike atelectasis projected over the left lower lung zone..  No large focal consolidation seen.  There is no pneumothorax.  The osseous structures remarkable for degenerative changes..                                       Medications   famotidine tablet 20 mg (has no administration in time range)   sodium chloride 0.9% flush 5 mL (has no administration in time range)   albuterol-ipratropium 2.5 mg-0.5 mg/3 mL nebulizer solution 3 mL (has no administration in time range)   melatonin tablet 6 mg (has no administration in time range)   ondansetron disintegrating tablet 4 mg (has no administration in time range)   prochlorperazine injection Soln 5 mg (has no administration in time range)   bisacodyL suppository 10 mg (has no administration in time range)   simethicone chewable tablet 80 mg (has no administration in time range)   aluminum-magnesium hydroxide-simethicone 200-200-20 mg/5 mL suspension 30 mL (has no administration in time range)   acetaminophen tablet 650 mg (has no administration in time range)   acetaminophen tablet 1,000 mg  (has no administration in time range)   naloxone 0.4 mg/mL injection 0.02 mg (has no administration in time range)   potassium bicarbonate disintegrating tablet 50 mEq (has no administration in time range)   potassium bicarbonate disintegrating tablet 35 mEq (has no administration in time range)   potassium bicarbonate disintegrating tablet 60 mEq (has no administration in time range)   glucose chewable tablet 16 g (has no administration in time range)   glucose chewable tablet 24 g (has no administration in time range)   dextrose 10% bolus 125 mL 125 mL (has no administration in time range)   dextrose 10% bolus 250 mL 250 mL (has no administration in time range)   glucagon (human recombinant) injection 1 mg (has no administration in time range)   polyethylene glycol packet 17 g (has no administration in time range)   nitroGLYCERIN SL tablet 0.4 mg (has no administration in time range)   metoprolol tartrate (LOPRESSOR) tablet 25 mg (has no administration in time range)   aspirin chewable tablet 81 mg (has no administration in time range)   atorvastatin tablet 40 mg (has no administration in time range)   aspirin tablet 325 mg (325 mg Oral Given 8/5/23 1200)   famotidine tablet 20 mg (20 mg Oral Given 8/5/23 1200)   aluminum-magnesium hydroxide-simethicone 200-200-20 mg/5 mL suspension 5 mL (5 mLs Oral Given 8/5/23 1200)   regadenoson injection 0.4 mg (0.4 mg Intravenous Given 8/7/23 1113)     Medical Decision Making:   Initial Assessment:   62-year-old male with a PMHx of SVT, HLD, acid reflux presents to ED with intermittent chest pain x3 days.  He is nontoxic appearing. hemodynamically stable.  I will initiate cardiac workup and reassess.  Differential Diagnosis:   ACS, arrhythmia, electrolyte derangement, anemia, GERD/gastritis  Independently Interpreted Test(s):   I have ordered and independently interpreted X-rays - see prior notes.  I have ordered and independently interpreted EKG Reading(s) - see prior  notes  Clinical Tests:   Lab Tests: Ordered and Reviewed  Radiological Study: Ordered and Reviewed  Medical Tests: Ordered and Reviewed  ED Management:  History concerning for ACS.  Heart score 4.  EKG with normal sinus rhythm. RBBB  which has been observed on prior studies.  Troponin negative x2.     Labs without leukocytosis.  H&H stable.  No significant electrolyte derangement. No pulmonary edema or infiltrate that would explain shortness of breath or chest pain. Normal BNP.    He remains hemodynamically stable during his stay.  On reassessment patient is resting comfortably.  Notes improvement in his pain since arrival.  Discussed with Hospital Medicine and will admit for provocative cardiac testing.   Other:   I discussed test(s) with the performing physician.    Additional MDM:   Heart Score:    History:          Highly suspicious.  ECG:             Normal  Age:               45-65 years  Risk factors: 1-2 risk factors  Troponin:       Less than or equal to normal limit  Final Score: 4           Attending Attestation:     Physician Attestation Statement for NP/PA:   I have directed and reviewed the workup performed by the PA/NP.  I performed the substantive portion of the medical decision making.     Other NP/PA Attestation Additions:    History of Present Illness: 62 year old man presents for evaluation of chest pain for previous 3 days' duration.               ED Course as of 08/07/23 1443   Sat Aug 05, 2023   1256 Troponin I: 0.006 [HM]   1257 BNP: 32 [HM]      ED Course User Index  [HM] Charlotte Schmitt PA-C                 Clinical Impression:   Final diagnoses:  [R07.89] Chest discomfort  [R07.9] Chest pain        ED Disposition Condition    Observation                 Charlotte Schmitt PA-C  08/05/23 1523       Rafa Vasquez MD  08/07/23 1443

## 2023-08-05 NOTE — ASSESSMENT & PLAN NOTE
- interval history and physical exam findings as described above  - EKG in the ED with evidence of sinus bradycardia and RBBB, no previous EKGs for comparison   - initial troponin in the ED 0.007, will continue to trend x3  - BNP 32  - CXR with evidence of interstitial findings are accentuated by habitus and shallow inspiratory effort, no large focal consolidation.  - received 325 mg ASA in the ED  - heart score of 4: highly suspicious story, age, risk factors (HLD)  - NPO at midnight for stress test in the am   - low threshold for cardiology consult pending stress test results   - continue 81 mg ASA daily   - prn nitroglycerin   - prn EKG for further episodes of chest pain  - will continue to monitor on tele

## 2023-08-05 NOTE — H&P
"[facility] - Emergency Dept  [facility] Medicine  History & Physical    Patient Name: Valentin Howell  MRN: [id number]  Patient Class: OP- Observation  Admission Date: 8/5/2023  Attending Physician: Jeffry Verdin MD   Primary Care Provider: Francisco Mcnally MD         Patient information was obtained from patient, past medical records and ER records.     Subjective:     Principal Problem:Chest pain    Chief Complaint:   Chief Complaint   Patient presents with    Chest Pain     Hx tachy ,  chest pain , hx tachycardia, vomiting 5 times        HPI: Valetnin Howell is a 62 y.o. male with a past medical history of HLD, GERD, and pSVT who is being placed in observation for chest pain. Patient presented to the ED with intermittent chest pain x3 days.  He noticed chest pain while trimming branches on Thursday. He describes the pain as a "crushing weight" present on his chest and epigastric region.  He experienced associated dizziness, palpitations, diaphoresis, generalized weakness, and 5 episodes of vomiting.  Patient reports that his symptoms resolved with rest and he took it easy yesterday due to this episode. However, he attempted to trim the branches again this morning and experienced the same symptoms. He was wearing his Apple watch during the episode today and it showed a HR of ~130 bpm, which decreased with rest. His son and daughter are cardiologists and prompted him to come to the ED. Patient denies SOB, smoking hx, fever, chills, or urinary changes.    ED: mildly hypertensive up to 140/92, otherwise VSSAF. CBC and CMP largely unremarkable. No leukocytosis, anemia, or electrolyte abnormalities. Initial troponin negative. BNP negative. EKG without ST changes. CXR with interstitial findings that are accentuated by habitus and shallow inspiratory effort, no large focal consolidation. Given 325 mg ASA, maalox, and pepcid.       Past Medical History:   Diagnosis Date    Acid reflux     Hypercholesteremia     SVT " (supraventricular tachycardia)        Past Surgical History:   Procedure Laterality Date    ABDOMINAL HERNIA REPAIR      HEEL SPUR SURGERY Right     HERNIA REPAIR Bilateral     TONSILLECTOMY         Review of patient's allergies indicates:  No Known Allergies    No current facility-administered medications on file prior to encounter.     Current Outpatient Medications on File Prior to Encounter   Medication Sig    aspirin 81 MG Chew Take 1 tablet (81 mg total) by mouth once daily.    famotidine (PEPCID) 10 MG tablet Take 1 tablet (10 mg total) by mouth 2 (two) times daily as needed.    metoprolol tartrate (LOPRESSOR) 25 MG tablet Take 1 tablet (25 mg total) by mouth daily as needed (For palpitations).    [DISCONTINUED] atorvastatin (LIPITOR) 20 MG tablet Take 1 tablet (20 mg total) by mouth once daily.    rosuvastatin (CRESTOR) 10 MG tablet Take 10 mg by mouth once daily.    tadalafiL (CIALIS) 10 MG tablet Take 1 tablet (10 mg total) by mouth once daily. (Patient not taking: Reported on 3/6/2023)     Family History       Problem Relation (Age of Onset)    Hyperlipidemia Father          Tobacco Use    Smoking status: Never    Smokeless tobacco: Never   Substance and Sexual Activity    Alcohol use: Not Currently     Alcohol/week: 0.0 standard drinks of alcohol    Drug use: No    Sexual activity: Yes     Partners: Female     Review of Systems   Constitutional:  Positive for activity change and fatigue. Negative for chills and fever.   HENT:  Negative for congestion, rhinorrhea and trouble swallowing.    Eyes:  Negative for photophobia and visual disturbance.   Respiratory:  Negative for chest tightness, shortness of breath and wheezing.    Cardiovascular:  Positive for chest pain palpitations (intermittent, with activity). Negative for and leg swelling.   Gastrointestinal:  Positive for abdominal pain (epigastric, intermittent with activity), diarrhea, nausea and vomiting. Negative for constipation.   Genitourinary:   Negative for dysuria, frequency, hematuria and urgency.   Musculoskeletal:  Negative for arthralgias, back pain and gait problem.   Skin:  Negative for color change and rash.   Neurological:  Positive for weakness (generalized). Negative for dizziness, syncope, light-headedness, numbness and headaches.   Psychiatric/Behavioral:  Negative for agitation and confusion. The patient is not nervous/anxious.      Objective:     Vital Signs (Most Recent):  Temp: 98 °F (36.7 °C) (08/05/23 1141)  Pulse: (!) 56 (08/05/23 1323)  Resp: 20 (08/05/23 1321)  BP: (!) 126/91 (08/05/23 1323)  SpO2: 97 % (08/05/23 1323) Vital Signs (24h Range):  Temp:  [98 °F (36.7 °C)-98.2 °F (36.8 °C)] 98 °F (36.7 °C)  Pulse:  [56-75] 56  Resp:  [13-22] 20  SpO2:  [96 %-99 %] 97 %  BP: (120-140)/(79-96) 126/91     Weight: 107 kg (235 lb 14.3 oz)  Body mass index is 27.97 kg/m².     Physical Exam  Vitals and nursing note reviewed.   Constitutional:       General: He is not in acute distress.     Appearance: He is well-developed.   HENT:      Head: Normocephalic and atraumatic.      Mouth/Throat:      Pharynx: No oropharyngeal exudate.   Eyes:      Conjunctiva/sclera: Conjunctivae normal.      Pupils: Pupils are equal, round, and reactive to light.   Cardiovascular:      Rate and Rhythm: Normal rate and regular rhythm.      Heart sounds: Normal heart sounds.   Pulmonary:      Effort: Pulmonary effort is normal. No tachypnea, accessory muscle usage or respiratory distress.      Breath sounds: No wheezing.      Comments: Mild RLL crackles  Abdominal:      General: Bowel sounds are normal. There is no distension.      Palpations: Abdomen is soft.      Tenderness: There is no abdominal tenderness.   Musculoskeletal:         General: No tenderness. Normal range of motion.      Cervical back: Normal range of motion and neck supple.      Right lower leg: No edema.      Left lower leg: No edema.   Lymphadenopathy:      Cervical: No cervical adenopathy.    Skin:     General: Skin is warm and dry.      Capillary Refill: Capillary refill takes less than 2 seconds.      Findings: No rash.   Neurological:      Mental Status: He is alert and oriented to person, place, and time.      Cranial Nerves: No cranial nerve deficit.      Sensory: No sensory deficit.      Coordination: Coordination normal.   Psychiatric:         Behavior: Behavior normal.         Thought Content: Thought content normal.         Judgment: Judgment normal.              CRANIAL NERVES     CN III, IV, VI   Pupils are equal, round, and reactive to light.       Significant Labs: All pertinent labs within the past 24 hours have been reviewed.  CBC:   Recent Labs   Lab 08/05/23  1208   WBC 9.48   HGB 15.3   HCT 44.4        CMP:   Recent Labs   Lab 08/05/23  1208      K 4.2      CO2 23   GLU 95   BUN 15   CREATININE 1.1   CALCIUM 9.7   PROT 7.7   ALBUMIN 4.3   BILITOT 0.6   ALKPHOS 67   AST 16   ALT 12   ANIONGAP 11     Cardiac Markers:   Recent Labs   Lab 08/05/23  1208   BNP 32       Significant Imaging: I have reviewed all pertinent imaging results/findings within the past 24 hours.    Assessment/Plan:     * Chest pain  - interval history and physical exam findings as described above  - EKG in the ED with evidence of sinus bradycardia and RBBB, no previous EKGs for comparison   - initial troponin in the ED 0.007, will continue to trend x3  - BNP 32  - CXR with evidence of interstitial findings are accentuated by habitus and shallow inspiratory effort, no large focal consolidation.  - received 325 mg ASA in the ED  - heart score of 4: highly suspicious story, age, risk factors (HLD)  - NPO at midnight for stress test in the am   - low threshold for cardiology consult pending stress test results   - continue 81 mg ASA daily   - prn nitroglycerin   - prn EKG for further episodes of chest pain  - will continue to monitor on tele    SVT (supraventricular tachycardia)  Patient reports an  episode of tachycardia a few years ago and his HR went up to 208 bpm. He was evaluated by cardiology and was initiated on metoprolol 25 mg prn at this time. He reports that he rarely takes this medication. He reports that his HR went up to ~130 today during his chest pain episode per his Apple watch.  - monitor closely on tele  - continue prn metoprolol    Hyperlipidemia  - on rosuvastatin 10 mg at home, will initiate atorvastatin 40 mg while inpatient    GERD (gastroesophageal reflux disease)  - continue pepcid prn        VTE Risk Mitigation (From admission, onward)           Ordered     IP VTE LOW RISK PATIENT  Once         08/05/23 1354                         On 08/05/2023, patient should be placed in hospital observation services under my care in collaboration with Dr. Jeffry Verdin.      Maryam Perez PA-C  Department of Hospital Medicine  [facility] - Emergency Dept

## 2023-08-05 NOTE — HPI
"Valentin Howell is a 62 y.o. male with a past medical history of HLD, GERD, and pSVT who is being placed in observation for chest pain. Patient presented to the ED with intermittent chest pain x3 days.  He noticed chest pain while trimming branches on Thursday. He describes the pain as a "crushing weight" present on his chest and epigastric region.  He experienced associated dizziness, palpitations, diaphoresis, generalized weakness, and 5 episodes of vomiting.  Patient reports that his symptoms resolved with rest and he took it easy yesterday due to this episode. However, he attempted to trim the branches again this morning and experienced the same symptoms. He was wearing his Apple watch during the episode today and it showed a HR of ~130 bpm, which decreased with rest. His son and daughter are cardiologists and prompted him to come to the ED. Patient denies SOB, smoking hx, fever, chills, or urinary changes.    ED: mildly hypertensive up to 140/92, otherwise VSSAF. CBC and CMP largely unremarkbale. No leukocytosis, anemia, or electrolyte abnormalities. Initial troponin negative. BNP negative. EKG without ST changes. CXR with interstitial findings that are accentuated by habitus and shallow inspiratory effort, no large focal consolidation. Given 325 mg ASA, maalox, and pepcid.   "

## 2023-08-05 NOTE — ED TRIAGE NOTES
C/o SOB with exertion associated with nausea and vomiting for 3 days. Self reported history of SVT, says he was prescribed PRN metoprolol for SVT but has not ever felt SOB with exertion. Denies sick contacts.

## 2023-08-05 NOTE — PHARMACY MED REC
"Admission Medication History     The home medication history was taken by Madelyn Vieira.    You may go to "Admission" then "Reconcile Home Medications" tabs to review and/or act upon these items.     The home medication list has been updated by the Pharmacy department.   Please read ALL comments highlighted in yellow.   Please address this information as you see fit.    Feel free to contact us if you have any questions or require assistance.      Medications listed below were obtained from: Patient/family and Analytic software- [employer] [facility]ations   Medication Sig    aspirin 81 MG Chew   Take 81 mg by mouth every other day.    famotidine (PEPCID) 10 MG tablet   Take 10 mg by mouth daily as needed for Heartburn.    metoprolol tartrate (LOPRESSOR) 25 MG tablet   Take 1 tablet (25 mg total) by mouth daily as needed (For palpitations).    omeprazole (PRILOSEC) 40 MG capsule   Take 40 mg by mouth 2 (two) times a day.    rosuvastatin (CRESTOR) 10 MG tablet   Take 10 mg by mouth every evening.    sertraline (ZOLOFT) 25 MG tablet   Take 25 mg by mouth once daily.    tadalafiL (CIALIS) 10 MG tablet   Take 1 tablet (10 mg total) by mouth once daily.       Potential issues to be addressed PRIOR TO DISCHARGE  Please discuss with the patient barriers to adherence with medication treatment plans  Patient requires education regarding drug therapies     Madelyn Vieira  EXT [id number]                  .          "

## 2023-08-05 NOTE — SUBJECTIVE & OBJECTIVE
Past Medical History:   Diagnosis Date    Acid reflux     Hypercholesteremia     SVT (supraventricular tachycardia)        Past Surgical History:   Procedure Laterality Date    ABDOMINAL HERNIA REPAIR      HEEL SPUR SURGERY Right     HERNIA REPAIR Bilateral     TONSILLECTOMY         Review of patient's allergies indicates:  No Known Allergies    No current facility-administered medications on file prior to encounter.     Current Outpatient Medications on File Prior to Encounter   Medication Sig    aspirin 81 MG Chew Take 1 tablet (81 mg total) by mouth once daily.    famotidine (PEPCID) 10 MG tablet Take 1 tablet (10 mg total) by mouth 2 (two) times daily as needed.    metoprolol tartrate (LOPRESSOR) 25 MG tablet Take 1 tablet (25 mg total) by mouth daily as needed (For palpitations).    [DISCONTINUED] atorvastatin (LIPITOR) 20 MG tablet Take 1 tablet (20 mg total) by mouth once daily.    rosuvastatin (CRESTOR) 10 MG tablet Take 10 mg by mouth once daily.    tadalafiL (CIALIS) 10 MG tablet Take 1 tablet (10 mg total) by mouth once daily. (Patient not taking: Reported on 3/6/2023)     Family History       Problem Relation (Age of Onset)    Hyperlipidemia Father          Tobacco Use    Smoking status: Never    Smokeless tobacco: Never   Substance and Sexual Activity    Alcohol use: Not Currently     Alcohol/week: 0.0 standard drinks of alcohol    Drug use: No    Sexual activity: Yes     Partners: Female     Review of Systems   Constitutional:  Positive for activity change and fatigue. Negative for chills and fever.   HENT:  Negative for congestion, rhinorrhea and trouble swallowing.    Eyes:  Negative for photophobia and visual disturbance.   Respiratory:  Negative for chest tightness, shortness of breath and wheezing.    Cardiovascular:  Positive for chest pain (intermittent, with activity). Negative for palpitations and leg swelling.   Gastrointestinal:  Positive for abdominal pain (epigastric, intermittent with  activity), diarrhea, nausea and vomiting. Negative for constipation.   Genitourinary:  Negative for dysuria, frequency, hematuria and urgency.   Musculoskeletal:  Negative for arthralgias, back pain and gait problem.   Skin:  Negative for color change and rash.   Neurological:  Positive for weakness (generalized). Negative for dizziness, syncope, light-headedness, numbness and headaches.   Psychiatric/Behavioral:  Negative for agitation and confusion. The patient is not nervous/anxious.      Objective:     Vital Signs (Most Recent):  Temp: 98 °F (36.7 °C) (08/05/23 1141)  Pulse: (!) 56 (08/05/23 1323)  Resp: 20 (08/05/23 1321)  BP: (!) 126/91 (08/05/23 1323)  SpO2: 97 % (08/05/23 1323) Vital Signs (24h Range):  Temp:  [98 °F (36.7 °C)-98.2 °F (36.8 °C)] 98 °F (36.7 °C)  Pulse:  [56-75] 56  Resp:  [13-22] 20  SpO2:  [96 %-99 %] 97 %  BP: (120-140)/(79-96) 126/91     Weight: 107 kg (235 lb 14.3 oz)  Body mass index is 27.97 kg/m².     Physical Exam  Vitals and nursing note reviewed.   Constitutional:       General: He is not in acute distress.     Appearance: He is well-developed.   HENT:      Head: Normocephalic and atraumatic.      Mouth/Throat:      Pharynx: No oropharyngeal exudate.   Eyes:      Conjunctiva/sclera: Conjunctivae normal.      Pupils: Pupils are equal, round, and reactive to light.   Cardiovascular:      Rate and Rhythm: Normal rate and regular rhythm.      Heart sounds: Normal heart sounds.   Pulmonary:      Effort: Pulmonary effort is normal. No tachypnea, accessory muscle usage or respiratory distress.      Breath sounds: No wheezing.      Comments: Mild RLL crackles  Abdominal:      General: Bowel sounds are normal. There is no distension.      Palpations: Abdomen is soft.      Tenderness: There is no abdominal tenderness.   Musculoskeletal:         General: No tenderness. Normal range of motion.      Cervical back: Normal range of motion and neck supple.      Right lower leg: No edema.      Left  lower leg: No edema.   Lymphadenopathy:      Cervical: No cervical adenopathy.   Skin:     General: Skin is warm and dry.      Capillary Refill: Capillary refill takes less than 2 seconds.      Findings: No rash.   Neurological:      Mental Status: He is alert and oriented to person, place, and time.      Cranial Nerves: No cranial nerve deficit.      Sensory: No sensory deficit.      Coordination: Coordination normal.   Psychiatric:         Behavior: Behavior normal.         Thought Content: Thought content normal.         Judgment: Judgment normal.              CRANIAL NERVES     CN III, IV, VI   Pupils are equal, round, and reactive to light.       Significant Labs: All pertinent labs within the past 24 hours have been reviewed.  CBC:   Recent Labs   Lab 08/05/23  1208   WBC 9.48   HGB 15.3   HCT 44.4        CMP:   Recent Labs   Lab 08/05/23  1208      K 4.2      CO2 23   GLU 95   BUN 15   CREATININE 1.1   CALCIUM 9.7   PROT 7.7   ALBUMIN 4.3   BILITOT 0.6   ALKPHOS 67   AST 16   ALT 12   ANIONGAP 11     Cardiac Markers:   Recent Labs   Lab 08/05/23  1208   BNP 32       Significant Imaging: I have reviewed all pertinent imaging results/findings within the past 24 hours.

## 2023-08-05 NOTE — ASSESSMENT & PLAN NOTE
Patient reports an episode of tachycardia a few years ago and his HR went up to 208 bpm. He was evaluated by cardiology and was initiated on metoprolol 25 mg prn at this time. He reports that he rarely takes this medication. He reports that his HR went up to ~130 today during his chest pain episode per his Apple watch.  - monitor closely on tele  - continue prn metoprolol

## 2023-08-06 LAB
ALBUMIN SERPL BCP-MCNC: 3.8 G/DL (ref 3.5–5.2)
ALP SERPL-CCNC: 62 U/L (ref 55–135)
ALT SERPL W/O P-5'-P-CCNC: 14 U/L (ref 10–44)
ANION GAP SERPL CALC-SCNC: 10 MMOL/L (ref 8–16)
ASCENDING AORTA: 4.07 CM
AST SERPL-CCNC: 20 U/L (ref 10–40)
AV INDEX (PROSTH): 0.99
AV MEAN GRADIENT: 3 MMHG
AV PEAK GRADIENT: 4 MMHG
AV VALVE AREA BY VELOCITY RATIO: 4.21 CM²
AV VALVE AREA: 4.49 CM²
AV VELOCITY RATIO: 0.93
BILIRUB SERPL-MCNC: 0.5 MG/DL (ref 0.1–1)
BSA FOR ECHO PROCEDURE: 2.41 M2
BUN SERPL-MCNC: 20 MG/DL (ref 8–23)
CALCIUM SERPL-MCNC: 9 MG/DL (ref 8.7–10.5)
CHLORIDE SERPL-SCNC: 106 MMOL/L (ref 95–110)
CO2 SERPL-SCNC: 22 MMOL/L (ref 23–29)
CREAT SERPL-MCNC: 0.9 MG/DL (ref 0.5–1.4)
CV ECHO LV RWT: 0.41 CM
DOP CALC AO PEAK VEL: 1 M/S
DOP CALC AO VTI: 20.8 CM
DOP CALC LVOT AREA: 4.5 CM2
DOP CALC LVOT DIAMETER: 2.4 CM
DOP CALC LVOT PEAK VEL: 0.93 M/S
DOP CALC LVOT STROKE VOLUME: 93.37 CM3
DOP CALCLVOT PEAK VEL VTI: 20.65 CM
E WAVE DECELERATION TIME: 329.56 MSEC
E/A RATIO: 0.93
E/E' RATIO: 4.15 M/S
ECHO LV POSTERIOR WALL: 1.03 CM (ref 0.6–1.1)
EST. GFR  (NO RACE VARIABLE): >60 ML/MIN/1.73 M^2
FRACTIONAL SHORTENING: 31 % (ref 28–44)
GLUCOSE SERPL-MCNC: 97 MG/DL (ref 70–110)
INTERVENTRICULAR SEPTUM: 1.16 CM (ref 0.6–1.1)
IVRT: 148.43 MSEC
LA MAJOR: 5.45 CM
LA MINOR: 5.63 CM
LA WIDTH: 4.63 CM
LEFT ATRIUM SIZE: 4.97 CM
LEFT ATRIUM VOLUME INDEX MOD: 25.1 ML/M2
LEFT ATRIUM VOLUME INDEX: 45.1 ML/M2
LEFT ATRIUM VOLUME MOD: 60.35 CM3
LEFT ATRIUM VOLUME: 108.33 CM3
LEFT INTERNAL DIMENSION IN SYSTOLE: 3.47 CM (ref 2.1–4)
LEFT VENTRICLE DIASTOLIC VOLUME INDEX: 49.41 ML/M2
LEFT VENTRICLE DIASTOLIC VOLUME: 118.59 ML
LEFT VENTRICLE MASS INDEX: 86 G/M2
LEFT VENTRICLE SYSTOLIC VOLUME INDEX: 20.7 ML/M2
LEFT VENTRICLE SYSTOLIC VOLUME: 49.77 ML
LEFT VENTRICULAR INTERNAL DIMENSION IN DIASTOLE: 5.01 CM (ref 3.5–6)
LEFT VENTRICULAR MASS: 206.52 G
LV LATERAL E/E' RATIO: 4 M/S
LV SEPTAL E/E' RATIO: 4.31 M/S
MV PEAK A VEL: 0.6 M/S
MV PEAK E VEL: 0.56 M/S
MV STENOSIS PRESSURE HALF TIME: 95.57 MS
MV VALVE AREA P 1/2 METHOD: 2.3 CM2
POTASSIUM SERPL-SCNC: 4.1 MMOL/L (ref 3.5–5.1)
PROT SERPL-MCNC: 6.9 G/DL (ref 6–8.4)
RA MAJOR: 5.06 CM
RA PRESSURE ESTIMATED: 3 MMHG
RA WIDTH: 4 CM
SINUS: 3.87 CM
SODIUM SERPL-SCNC: 138 MMOL/L (ref 136–145)
STJ: 3.19 CM
TDI LATERAL: 0.14 M/S
TDI SEPTAL: 0.13 M/S
TDI: 0.14 M/S
TRICUSPID ANNULAR PLANE SYSTOLIC EXCURSION: 2.81 CM
Z-SCORE OF LEFT VENTRICULAR DIMENSION IN END DIASTOLE: -7.81
Z-SCORE OF LEFT VENTRICULAR DIMENSION IN END SYSTOLE: -5.02

## 2023-08-06 PROCEDURE — 99233 SBSQ HOSP IP/OBS HIGH 50: CPT | Mod: ,,, | Performed by: PHYSICIAN ASSISTANT

## 2023-08-06 PROCEDURE — 99233 PR SUBSEQUENT HOSPITAL CARE,LEVL III: ICD-10-PCS | Mod: ,,, | Performed by: PHYSICIAN ASSISTANT

## 2023-08-06 PROCEDURE — 36415 COLL VENOUS BLD VENIPUNCTURE: CPT

## 2023-08-06 PROCEDURE — 80053 COMPREHEN METABOLIC PANEL: CPT

## 2023-08-06 PROCEDURE — G0378 HOSPITAL OBSERVATION PER HR: HCPCS

## 2023-08-06 PROCEDURE — 25000003 PHARM REV CODE 250

## 2023-08-06 RX ORDER — NAPROXEN SODIUM 220 MG/1
81 TABLET, FILM COATED ORAL NIGHTLY
Status: DISCONTINUED | OUTPATIENT
Start: 2023-08-07 | End: 2023-08-07 | Stop reason: HOSPADM

## 2023-08-06 RX ORDER — ATORVASTATIN CALCIUM 40 MG/1
40 TABLET, FILM COATED ORAL NIGHTLY
Status: DISCONTINUED | OUTPATIENT
Start: 2023-08-07 | End: 2023-08-07 | Stop reason: HOSPADM

## 2023-08-06 RX ADMIN — ATORVASTATIN CALCIUM 40 MG: 40 TABLET, FILM COATED ORAL at 08:08

## 2023-08-06 RX ADMIN — ASPIRIN 81 MG 81 MG: 81 TABLET ORAL at 08:08

## 2023-08-06 NOTE — NURSING
Stress testing unable to be completed today due to schedule limitations. Testing will be completed tomorrow.

## 2023-08-06 NOTE — PROGRESS NOTES
"[facility] [address] [place] [address] Medicine  Progress Note    Patient Name: Valentin Howell  MRN: [id number]  Patient Class: OP- Observation   Admission Date: 8/5/2023  Length of Stay: 0 days  Attending Physician: Jeffry Verdin MD  Primary Care Provider: Francisco Mcnally MD        Subjective:     Principal Problem:Chest pain        HPI:  Valentin Howell is a 62 y.o. male with a past medical history of HLD, GERD, and pSVT who is being placed in observation for chest pain. Patient presented to the ED with intermittent chest pain x3 days.  He noticed chest pain while trimming branches on Thursday. He describes the pain as a "crushing weight" present on his chest and epigastric region.  He experienced associated dizziness, palpitations, diaphoresis, generalized weakness, and 5 episodes of vomiting.  Patient reports that his symptoms resolved with rest and he took it easy yesterday due to this episode. However, he attempted to trim the branches again this morning and experienced the same symptoms. He was wearing his Apple watch during the episode today and it showed a HR of ~130 bpm, which decreased with rest. His son and daughter are cardiologists and prompted him to come to the ED. Patient denies SOB, smoking hx, fever, chills, or urinary changes.    ED: mildly hypertensive up to 140/92, otherwise VSSAF. CBC and CMP largely unremarkbale. No leukocytosis, anemia, or electrolyte abnormalities. Initial troponin negative. BNP negative. EKG without ST changes. CXR with interstitial findings that are accentuated by habitus and shallow inspiratory effort, no large focal consolidation. Given 325 mg ASA, maalox, and pepcid.       Overview/Hospital Course:  Mr Mikey Howell was admitted to observation for chest pain rule out. Troponin negative X 3. Dobutamine stress echo pending.       Interval History: Patient seen at bedside with wife. No episodes of chest pain this am. Echo pending. Stress echo unable to be completed 2/2 " scheduling issues. Will go tomorrow am. Dr Verdin discussed this with family who is agreeable to staying. NPO midnight.     Review of Systems   Constitutional:  Negative for fatigue and fever.   Respiratory:  Negative for cough and shortness of breath.    Cardiovascular:  Negative for chest pain, palpitations and leg swelling.   Gastrointestinal:  Negative for abdominal pain and nausea.   Musculoskeletal:  Negative for back pain.   Neurological:  Negative for dizziness and headaches.     Objective:     Vital Signs (Most Recent):  Temp: 97.7 °F (36.5 °C) (08/06/23 1208)  Pulse: (!) 55 (08/06/23 1208)  Resp: 18 (08/06/23 1208)  BP: 120/80 (08/06/23 1208)  SpO2: 96 % (08/06/23 1208) Vital Signs (24h Range):  Temp:  [96.8 °F (36 °C)-98.3 °F (36.8 °C)] 97.7 °F (36.5 °C)  Pulse:  [53-63] 55  Resp:  [16-18] 18  SpO2:  [94 %-99 %] 96 %  BP: (102-128)/(60-81) 120/80     Weight: 107 kg (235 lb 14.3 oz)  Body mass index is 27.97 kg/m².  No intake or output data in the 24 hours ending 08/06/23 1434      Physical Exam  Vitals and nursing note reviewed.   Constitutional:       General: He is not in acute distress.     Appearance: Normal appearance.   HENT:      Head: Normocephalic and atraumatic.   Cardiovascular:      Rate and Rhythm: Normal rate and regular rhythm.   Pulmonary:      Effort: Pulmonary effort is normal.   Neurological:      General: No focal deficit present.      Mental Status: He is alert and oriented to person, place, and time.   Psychiatric:         Mood and Affect: Mood normal.         Behavior: Behavior normal.             Significant Labs: All pertinent labs within the past 24 hours have been reviewed.  CBC:   Recent Labs   Lab 08/05/23  1208   WBC 9.48   HGB 15.3   HCT 44.4        CMP:   Recent Labs   Lab 08/05/23  1208 08/06/23  0618    138   K 4.2 4.1    106   CO2 23 22*   GLU 95 97   BUN 15 20   CREATININE 1.1 0.9   CALCIUM 9.7 9.0   PROT 7.7 6.9   ALBUMIN 4.3 3.8   BILITOT 0.6 0.5    ALKPHOS 67 62   AST 16 20   ALT 12 14   ANIONGAP 11 10     Troponin:   Recent Labs   Lab 08/05/23  1208 08/05/23  1826   TROPONINI 0.006  0.007 0.006       Significant Imaging: I have reviewed all pertinent imaging results/findings within the past 24 hours.      Assessment/Plan:      * Chest pain  - interval history and physical exam findings as described above  - EKG in the ED with evidence of sinus bradycardia and RBBB, no previous EKGs for comparison   - initial troponin in the ED 0.007, will continue to trend x3  - BNP 32  - CXR with evidence of interstitial findings are accentuated by habitus and shallow inspiratory effort, no large focal consolidation.  - received 325 mg ASA in the ED  - heart score of 4: highly suspicious story, age, risk factors (HLD)  - NPO at midnight for stress test in the am   - TTE pending  - low threshold for cardiology consult pending stress test results   - continue 81 mg ASA daily   - prn nitroglycerin   - prn EKG for further episodes of chest pain  - will continue to monitor on tele          Hyperlipidemia  - on rosuvastatin 10 mg at home, will initiate atorvastatin 40 mg while inpatient      SVT (supraventricular tachycardia)  Patient reports an episode of tachycardia a few years ago and his HR went up to 208 bpm. He was evaluated by cardiology and was initiated on metoprolol 25 mg prn at this time. He reports that he rarely takes this medication. He reports that his HR went up to ~130 today during his chest pain episode per his Apple watch.  - monitor closely on tele  - continue prn metoprolol      GERD (gastroesophageal reflux disease)  - continue pepcid prn        VTE Risk Mitigation (From admission, onward)         Ordered     IP VTE LOW RISK PATIENT  Once         08/05/23 1354                Discharge Planning   JEAN: 8/7/2023     Code Status: Full Code   Is the patient medically ready for discharge?: No    Reason for patient still in hospital (select all that apply):  Patient trending condition, Laboratory test and Imaging                     Manasa Syed PA-C  Department of Hospital Medicine   [facility]

## 2023-08-06 NOTE — SUBJECTIVE & OBJECTIVE
Interval History: Patient seen at bedside with wife. No episodes of chest pain this am. Echo pending. Stress echo unable to be completed 2/2 scheduling issues. Will go tomorrow am. Dr Verdin discussed this with family who is agreeable to staying. NPO midnight.     Review of Systems   Constitutional:  Negative for fatigue and fever.   Respiratory:  Negative for cough and shortness of breath.    Cardiovascular:  Negative for chest pain, palpitations and leg swelling.   Gastrointestinal:  Negative for abdominal pain and nausea.   Musculoskeletal:  Negative for back pain.   Neurological:  Negative for dizziness and headaches.     Objective:     Vital Signs (Most Recent):  Temp: 97.7 °F (36.5 °C) (08/06/23 1208)  Pulse: (!) 55 (08/06/23 1208)  Resp: 18 (08/06/23 1208)  BP: 120/80 (08/06/23 1208)  SpO2: 96 % (08/06/23 1208) Vital Signs (24h Range):  Temp:  [96.8 °F (36 °C)-98.3 °F (36.8 °C)] 97.7 °F (36.5 °C)  Pulse:  [53-63] 55  Resp:  [16-18] 18  SpO2:  [94 %-99 %] 96 %  BP: (102-128)/(60-81) 120/80     Weight: 107 kg (235 lb 14.3 oz)  Body mass index is 27.97 kg/m².  No intake or output data in the 24 hours ending 08/06/23 1434      Physical Exam  Vitals and nursing note reviewed.   Constitutional:       General: He is not in acute distress.     Appearance: Normal appearance.   HENT:      Head: Normocephalic and atraumatic.   Cardiovascular:      Rate and Rhythm: Normal rate and regular rhythm.   Pulmonary:      Effort: Pulmonary effort is normal.   Neurological:      General: No focal deficit present.      Mental Status: He is alert and oriented to person, place, and time.   Psychiatric:         Mood and Affect: Mood normal.         Behavior: Behavior normal.             Significant Labs: All pertinent labs within the past 24 hours have been reviewed.  CBC:   Recent Labs   Lab 08/05/23  1208   WBC 9.48   HGB 15.3   HCT 44.4        CMP:   Recent Labs   Lab 08/05/23  1208 08/06/23  0618    138   K 4.2 4.1     106   CO2 23 22*   GLU 95 97   BUN 15 20   CREATININE 1.1 0.9   CALCIUM 9.7 9.0   PROT 7.7 6.9   ALBUMIN 4.3 3.8   BILITOT 0.6 0.5   ALKPHOS 67 62   AST 16 20   ALT 12 14   ANIONGAP 11 10     Troponin:   Recent Labs   Lab 08/05/23  1208 08/05/23  1826   TROPONINI 0.006  0.007 0.006       Significant Imaging: I have reviewed all pertinent imaging results/findings within the past 24 hours.

## 2023-08-06 NOTE — HOSPITAL COURSE
Mr Mikey Howell was admitted to observation for chest pain rule out. Pt afebrile and hemodynamically stable. Troponin negative X 3. TTE>>There is concentric remodeling. Normal wall motion. There is normal systolic function with a visually estimated ejection fraction of 55 - 70%. There is normal diastolic function. Dobutamine stress echo>>The ECG portion of the study is negative for ischemia. The ECG portion of the study is negative for ischemia. There were no arrhythmias during stress. No convincing scintigraphic evidence for ischemia or infarct. The global left ventricular systolic function is normal with an LV ejection fraction of 62 % noting mild LV dilatation. Wall motion is normal. Chest pain resolved. Pt to follow up w/ OP cardiologist. Given cardiac event monitor prior to discharge. Pt med rec'd. Pt medically ready for discharge home. Pt and family educated on hospital course and plan, verbally agrees and understands. All questions answered.

## 2023-08-06 NOTE — PLAN OF CARE
[facility]  Discharge Assessment    Primary Care Provider: Francisco Mcnally MD     Discharge Assessment (most recent)       BRIEF DISCHARGE ASSESSMENT - 08/06/23 1720          Discharge Planning    Assessment Type Discharge Planning Brief Assessment     Resource/Environmental Concerns none     Support Systems Spouse/significant other;Children     Assistance Needed none     Equipment Currently Used at Home none     Current Living Arrangements home     Patient/Family Anticipates Transition to home     Patient/Family Anticipated Services at Transition none     DME Needed Upon Discharge  other (see comments)   TBD    Discharge Plan A Home with family     Discharge Plan B Home        Physical Activity    On average, how many days per week do you engage in moderate to strenuous exercise (like a brisk walk)? 3 days     On average, how many minutes do you engage in exercise at this level? 30 min        Financial Resource Strain    How hard is it for you to pay for the very basics like food, housing, medical care, and heating? Not hard at all        Housing Stability    In the last 12 months, was there a time when you were not able to pay the mortgage or rent on time? No     In the last 12 months, how many places have you lived? 1     In the last 12 months, was there a time when you did not have a steady place to sleep or slept in a shelter (including now)? No        Transportation Needs    In the past 12 months, has lack of transportation kept you from medical appointments or from getting medications? No     In the past 12 months, has lack of transportation kept you from meetings, work, or from getting things needed for daily living? No        Food Insecurity    Within the past 12 months, you worried that your food would run out before you got the money to buy more. Never true     Within the past 12 months, the food you bought just didn't last and you didn't have money to get more. Never true         Stress    Do you feel stress - tense, restless, nervous, or anxious, or unable to sleep at night because your mind is troubled all the time - these days? Only a little        Social Connections    In a typical week, how many times do you talk on the phone with family, friends, or neighbors? Three times a week     How often do you get together with friends or relatives? Three times a week     How often do you attend Worship or Yazidi services? 1 to 4 times per year     Do you belong to any clubs or organizations such as Worship groups, unions, fraternal or athletic groups, or school groups? No     How often do you attend meetings of the clubs or organizations you belong to? Never     Are you , , , , never , or living with a partner? Living with partner        Alcohol Use    Q1: How often do you have a drink containing alcohol? Never     Q2: How many drinks containing alcohol do you have on a typical day when you are drinking? Patient does not drink     Q3: How often do you have six or more drinks on one occasion? Never                   SW met with pt at bedside. Pt confirmed face sheet information. Lives with SO. No DME. No HH. No coumadin. No dialysis. Ok with bedside delivery. SW/CM to follow.     Megan Suresh, MSBHARAT, LCSW  [place] -[facility] [place] [facility] [phone]

## 2023-08-06 NOTE — ASSESSMENT & PLAN NOTE
- interval history and physical exam findings as described above  - EKG in the ED with evidence of sinus bradycardia and RBBB, no previous EKGs for comparison   - initial troponin in the ED 0.007, will continue to trend x3  - BNP 32  - CXR with evidence of interstitial findings are accentuated by habitus and shallow inspiratory effort, no large focal consolidation.  - received 325 mg ASA in the ED  - heart score of 4: highly suspicious story, age, risk factors (HLD)  - NPO at midnight for stress test in the am   - TTE pending  - low threshold for cardiology consult pending stress test results   - continue 81 mg ASA daily   - prn nitroglycerin   - prn EKG for further episodes of chest pain  - will continue to monitor on tele

## 2023-08-07 ENCOUNTER — CLINICAL SUPPORT (OUTPATIENT)
Dept: CARDIOLOGY | Facility: HOSPITAL | Age: 63
End: 2023-08-07
Attending: EMERGENCY MEDICINE
Payer: COMMERCIAL

## 2023-08-07 VITALS
HEIGHT: 77 IN | TEMPERATURE: 98 F | SYSTOLIC BLOOD PRESSURE: 135 MMHG | HEART RATE: 54 BPM | OXYGEN SATURATION: 100 % | BODY MASS INDEX: 27.75 KG/M2 | DIASTOLIC BLOOD PRESSURE: 94 MMHG | WEIGHT: 235 LBS | RESPIRATION RATE: 18 BRPM

## 2023-08-07 DIAGNOSIS — I47.10 SVT (SUPRAVENTRICULAR TACHYCARDIA): ICD-10-CM

## 2023-08-07 DIAGNOSIS — R07.9 CHEST PAIN: ICD-10-CM

## 2023-08-07 LAB
ALBUMIN SERPL BCP-MCNC: 3.7 G/DL (ref 3.5–5.2)
ALP SERPL-CCNC: 61 U/L (ref 55–135)
ALT SERPL W/O P-5'-P-CCNC: 18 U/L (ref 10–44)
ANION GAP SERPL CALC-SCNC: 10 MMOL/L (ref 8–16)
AST SERPL-CCNC: 20 U/L (ref 10–40)
BILIRUB SERPL-MCNC: 0.3 MG/DL (ref 0.1–1)
BUN SERPL-MCNC: 19 MG/DL (ref 8–23)
CALCIUM SERPL-MCNC: 9 MG/DL (ref 8.7–10.5)
CHLORIDE SERPL-SCNC: 105 MMOL/L (ref 95–110)
CO2 SERPL-SCNC: 22 MMOL/L (ref 23–29)
CREAT SERPL-MCNC: 0.8 MG/DL (ref 0.5–1.4)
CV STRESS BASE HR: 56 BPM
DIASTOLIC BLOOD PRESSURE: 81 MMHG
EST. GFR  (NO RACE VARIABLE): >60 ML/MIN/1.73 M^2
GLUCOSE SERPL-MCNC: 100 MG/DL (ref 70–110)
OHS CV CPX 1 MINUTE RECOVERY HEART RATE: 87 BPM
OHS CV CPX 85 PERCENT MAX PREDICTED HEART RATE MALE: 135
OHS CV CPX MAX PREDICTED HEART RATE: 159
OHS CV CPX PATIENT IS FEMALE: 0
OHS CV CPX PATIENT IS MALE: 1
OHS CV CPX PEAK DIASTOLIC BLOOD PRESSURE: 84 MMHG
OHS CV CPX PEAK HEAR RATE: 86 BPM
OHS CV CPX PEAK RATE PRESSURE PRODUCT: NORMAL
OHS CV CPX PEAK SYSTOLIC BLOOD PRESSURE: 120 MMHG
OHS CV CPX PERCENT MAX PREDICTED HEART RATE ACHIEVED: 54
OHS CV CPX RATE PRESSURE PRODUCT PRESENTING: 6888
POTASSIUM SERPL-SCNC: 4.1 MMOL/L (ref 3.5–5.1)
PROT SERPL-MCNC: 6.8 G/DL (ref 6–8.4)
SODIUM SERPL-SCNC: 137 MMOL/L (ref 136–145)
SYSTOLIC BLOOD PRESSURE: 123 MMHG

## 2023-08-07 PROCEDURE — 36415 COLL VENOUS BLD VENIPUNCTURE: CPT

## 2023-08-07 PROCEDURE — 93270 REMOTE 30 DAY ECG REV/REPORT: CPT

## 2023-08-07 PROCEDURE — 93271 ECG/MONITORING AND ANALYSIS: CPT

## 2023-08-07 PROCEDURE — 99239 PR HOSPITAL DISCHARGE DAY,>30 MIN: ICD-10-PCS | Mod: ,,, | Performed by: PHYSICIAN ASSISTANT

## 2023-08-07 PROCEDURE — 93272 ECG/REVIEW INTERPRET ONLY: CPT | Mod: ,,, | Performed by: INTERNAL MEDICINE

## 2023-08-07 PROCEDURE — 94761 N-INVAS EAR/PLS OXIMETRY MLT: CPT

## 2023-08-07 PROCEDURE — 99239 HOSP IP/OBS DSCHRG MGMT >30: CPT | Mod: ,,, | Performed by: PHYSICIAN ASSISTANT

## 2023-08-07 PROCEDURE — 93272 CARDIAC EVENT MONITOR (CUPID ONLY): ICD-10-PCS | Mod: ,,, | Performed by: INTERNAL MEDICINE

## 2023-08-07 PROCEDURE — 80053 COMPREHEN METABOLIC PANEL: CPT

## 2023-08-07 PROCEDURE — G0378 HOSPITAL OBSERVATION PER HR: HCPCS

## 2023-08-07 PROCEDURE — 63600175 PHARM REV CODE 636 W HCPCS: Performed by: INTERNAL MEDICINE

## 2023-08-07 RX ORDER — REGADENOSON 0.08 MG/ML
0.4 INJECTION, SOLUTION INTRAVENOUS
Status: COMPLETED | OUTPATIENT
Start: 2023-08-07 | End: 2023-08-07

## 2023-08-07 RX ADMIN — REGADENOSON 0.4 MG: 0.08 INJECTION, SOLUTION INTRAVENOUS at 11:08

## 2023-08-07 NOTE — NURSING
Patient verified by 2 identifiers and allergies reviewed.  18 g IV in place to Lt AC.  Lexiscan testing explained to patient, patient verbalized understanding, consent obtained & testing completed.  Pt tolerated testing well except for C/O mild SOB after Regadenoson administration which resolved during the recovery period.  IV flushed post testing.  Post study discharge instructions reviewed with patient, patient verbalized understanding.  Patient taken to [facility] by [facility] for completion of pictures.

## 2023-08-07 NOTE — NURSING
Pt and his wife both state that he understands his discharge paperwork as well as his follow-up care.

## 2023-08-07 NOTE — PLAN OF CARE
CM attended huddle earlier today. Patient is medically ready to discharge home with family. No post acute needs. Patient's wife will provide ride home. Patient is ready to discharger from case management standpoint.    Sara Evans RN  Weekend  - [facility] [place] [address]  Spectralink: [phone]

## 2023-08-08 NOTE — PLAN OF CARE
[facility]  Discharge Final Note    Primary Care Provider: Francisco Mcnally MD    Expected Discharge Date: 8/7/2023    Final Discharge Note (most recent)       Final Note - 08/07/23 1859          Final Note    Assessment Type Final Discharge Note     Anticipated Discharge Disposition Home or Self Care     Hospital Resources/Appts/Education Provided Provided patient/caregiver with written discharge plan information;Appointments scheduled by Navigator/Coordinator        Post-Acute Status    Discharge Delays None known at this time                   Important Message from Medicare         Sara Evans RN  Weekend  - [facility] [place]  Spectralink: [phone]

## 2023-08-09 NOTE — DISCHARGE SUMMARY
"[facility] [address] [place] [address] Medicine  Discharge Summary      Patient Name: Valentin Howell  MRN: [id number]  JUSTYNA: [id number]  Patient Class: OP- Observation  Admission Date: 8/5/2023  Hospital Length of Stay: 0 days  Discharge Date and Time: 8/7/2023  3:45 PM  Attending Physician: Jeffry Verdin MD  Discharging Provider: Jonathan Juarez PA-C  Primary Care Provider: Francisco Mcnally MD  Hospital Medicine Team: [facility] [place] [facility] MED Y Jonathan Juarez PA-C  Primary Care Team: [facility] MED Y    HPI:   Valentin Howell is a 62 y.o. male with a past medical history of HLD, GERD, and pSVT who is being placed in observation for chest pain. Patient presented to the ED with intermittent chest pain x3 days.  He noticed chest pain while trimming branches on Thursday. He describes the pain as a "crushing weight" present on his chest and epigastric region.  He experienced associated dizziness, palpitations, diaphoresis, generalized weakness, and 5 episodes of vomiting.  Patient reports that his symptoms resolved with rest and he took it easy yesterday due to this episode. However, he attempted to trim the branches again this morning and experienced the same symptoms. He was wearing his Apple watch during the episode today and it showed a HR of ~130 bpm, which decreased with rest. His son and daughter are cardiologists and prompted him to come to the ED. Patient denies SOB, smoking hx, fever, chills, or urinary changes.    ED: mildly hypertensive up to 140/92, otherwise VSSAF. CBC and CMP largely unremarkbale. No leukocytosis, anemia, or electrolyte abnormalities. Initial troponin negative. BNP negative. EKG without ST changes. CXR with interstitial findings that are accentuated by habitus and shallow inspiratory effort, no large focal consolidation. Given 325 mg ASA, maalox, and pepcid.       * No surgery found *      Hospital Course:   Mr Mikey Howell was admitted to observation for chest pain rule out. Pt afebrile and " hemodynamically stable. Troponin negative X 3. TTE>>There is concentric remodeling. Normal wall motion. There is normal systolic function with a visually estimated ejection fraction of 55 - 70%. There is normal diastolic function. Dobutamine stress echo>>The ECG portion of the study is negative for ischemia. The ECG portion of the study is negative for ischemia. There were no arrhythmias during stress. No convincing scintigraphic evidence for ischemia or infarct. The global left ventricular systolic function is normal with an LV ejection fraction of 62 % noting mild LV dilatation. Wall motion is normal. Chest pain resolved. Pt to follow up w/ OP cardiologist. Given cardiac event monitor prior to discharge. Pt med rec'd. Pt medically ready for discharge home. Pt and family educated on hospital course and plan, verbally agrees and understands. All questions answered.         Goals of Care Treatment Preferences:  Code Status: Full Code      Consults:     Cardiac/Vascular  * Chest pain  - interval history and physical exam findings as described above  - EKG in the ED with evidence of sinus bradycardia and RBBB, no previous EKGs for comparison   - initial troponin in the ED 0.007, will continue to trend x3  - BNP 32  - CXR with evidence of interstitial findings are accentuated by habitus and shallow inspiratory effort, no large focal consolidation.  - received 325 mg ASA in the ED  - heart score of 4: highly suspicious story, age, risk factors (HLD)  - NPO at midnight for stress test in the am   - TTE pending  - low threshold for cardiology consult pending stress test results   - continue 81 mg ASA daily   - prn nitroglycerin   - prn EKG for further episodes of chest pain  - will continue to monitor on tele      Final Active Diagnoses:    Diagnosis Date Noted POA    PRINCIPAL PROBLEM:  Chest pain [R07.9] 08/05/2023 Yes    Hyperlipidemia [E78.5] 04/05/2021 Yes    SVT (supraventricular tachycardia) [I47.1] 03/31/2021  Yes    GERD (gastroesophageal reflux disease) [K21.9] 03/02/2015 Yes      Problems Resolved During this Admission:       Discharged Condition: good    Disposition: Home or Self Care    Follow Up:    Patient Instructions:      Notify your health care provider if you experience any of the following:  temperature >100.4     Notify your health care provider if you experience any of the following:  severe uncontrolled pain     Notify your health care provider if you experience any of the following:  persistent dizziness, light-headedness, or visual disturbances     Notify your health care provider if you experience any of the following:  increased confusion or weakness     Cardiac event monitor   Standing Status: Future Number of Occurrences: 1 Standing Exp. Date: 08/07/24     Order Specific Question Answer Comments   Cardiac Event Monitor Auto Trigger    Release to patient Immediate      Activity as tolerated       Significant Diagnostic Studies: Labs: All labs within the past 24 hours have been reviewed    Pending Diagnostic Studies:     None         Medications:  Reconciled Home Medications:      Medication List      CHANGE how you take these medications    aspirin 81 MG Chew  Take 1 tablet (81 mg total) by mouth once daily.  What changed: when to take this     famotidine 10 MG tablet  Commonly known as: PEPCID  Take 1 tablet (10 mg total) by mouth 2 (two) times daily as needed.  What changed:   · when to take this  · reasons to take this        CONTINUE taking these medications    metoprolol tartrate 25 MG tablet  Commonly known as: LOPRESSOR  Take 1 tablet (25 mg total) by mouth daily as needed (For palpitations).     omeprazole 40 MG capsule  Commonly known as: PRILOSEC  Take 40 mg by mouth 2 (two) times a day.     rosuvastatin 10 MG tablet  Commonly known as: CRESTOR  Take 10 mg by mouth every evening.     sertraline 25 MG tablet  Commonly known as: ZOLOFT  Take 25 mg by mouth once daily.     tadalafiL 10 MG  tablet  Commonly known as: CIALIS  Take 1 tablet (10 mg total) by mouth once daily.            Indwelling Lines/Drains at time of discharge:   Lines/Drains/Airways     None                 Time spent on the discharge of patient: 36 minutes         Jonathan Juarez PA-C  Department of Hospital Medicine  [facility]

## 2023-08-10 NOTE — TELEPHONE ENCOUNTER
Refill Routing Note   Medication(s) are not appropriate for processing by [facility] for the following reason(s):      Medication outside of protocol    [facility] action(s):  Route Care Due:  None identified              Appointments  past 12m or future 3m with PCP    Date Provider   Last Visit   3/6/2023 Francisco Mcnally MD   Next Visit   Visit date not found Francisco Mcnally MD   ED visits in past 90 days: 0        Note composed:3:34 PM 08/10/2023

## 2023-08-11 RX ORDER — NAPROXEN SODIUM 220 MG/1
81 TABLET, FILM COATED ORAL DAILY
Qty: 90 TABLET | Refills: 3 | Status: SHIPPED | OUTPATIENT
Start: 2023-08-11

## 2023-08-14 ENCOUNTER — OFFICE VISIT (OUTPATIENT)
Dept: SPINE | Facility: CLINIC | Age: 63
End: 2023-08-14
Payer: COMMERCIAL

## 2023-08-14 DIAGNOSIS — M54.16 LUMBAR RADICULOPATHY: Primary | ICD-10-CM

## 2023-08-14 DIAGNOSIS — M54.9 BACK PAIN, UNSPECIFIED BACK LOCATION, UNSPECIFIED BACK PAIN LATERALITY, UNSPECIFIED CHRONICITY: ICD-10-CM

## 2023-08-14 DIAGNOSIS — M54.51 VERTEBROGENIC LOW BACK PAIN: ICD-10-CM

## 2023-08-14 PROCEDURE — 3044F HG A1C LEVEL LT 7.0%: CPT | Mod: CPTII,95,, | Performed by: ANESTHESIOLOGY

## 2023-08-14 PROCEDURE — 99214 OFFICE O/P EST MOD 30 MIN: CPT | Mod: 95,ICN,, | Performed by: ANESTHESIOLOGY

## 2023-08-14 PROCEDURE — 99214 PR OFFICE/OUTPT VISIT, EST, LEVL IV, 30-39 MIN: ICD-10-PCS | Mod: 95,ICN,, | Performed by: ANESTHESIOLOGY

## 2023-08-14 PROCEDURE — 3044F PR MOST RECENT HEMOGLOBIN A1C LEVEL <7.0%: ICD-10-PCS | Mod: CPTII,95,, | Performed by: ANESTHESIOLOGY

## 2023-08-14 RX ORDER — LIDOCAINE 50 MG/G
1 PATCH TOPICAL DAILY
Qty: 30 PATCH | Refills: 2 | Status: SHIPPED | OUTPATIENT
Start: 2023-08-14

## 2023-08-14 NOTE — PROGRESS NOTES
Chronic Pain-Tele-Medicine-Established Note (Follow up visit)      The patient location is: Home  The chief complaint leading to consultation is: Back pain  Visit type: Virtual visit with synchronous audio and video  Total time spent with patient: 15 min  Each patient to whom he or she provides medical services by telemedicine is:  (1) informed of the relationship between the physician and patient and the respective role of any other health care provider with respect to management of the patient; and (2) notified that he or she may decline to receive medical services by telemedicine and may withdraw from such care at any time.    Notes:     SUBJECTIVE:    Interval History (8/14/23):  Valentin Howell presents tele-medicine appointment for a follow-up appointment for low back pain. Since the last visit, Valentin Howell states the pain has been persistant. Patient continues to endorse low back pain with radicular symptoms (R>L) that extends from his low back down the posterior lateral aspects of is legs to his calves bilaterally. Patient reports significant pain over the weekend after driving to [place] with difficulty walking after the drive. His son was able to get him a patch (patient unsure what kind of patch, but sounds like lidocaine patch) for the ride home which significantly improved his pain and he is doing well at this time. Current pain intensity is 4/10. Patient denies red flags including weakness, unexpected weight loss/gain, night sweats/fevers, saddle anesthesia, and symptoms of CATY.       Original HISTORY OF PRESENT ILLNESS (3/13/23):   Valentin Howell presents to the clinic for the evaluation of the above pain. The pain started over 10 years ago.     Original Pain Description:  The pain is located in the low back and does not radiate. It is associated with numbness of the right lateral thigh and numbness of the left lateral calf. He states both of these come on suddenly while standing for longer than 1-2  hours but do not cause any pain. The back pain is described as aching and dull. Exacerbating factors: Standing, Bending, Extension, Flexing, Lifting, and Getting out of bed/chair. Mitigating factors laying down, medications (voltaren gel), and rest. Symptoms interfere with daily activity, sleeping, and work. The patient feels like symptoms have been worsening. Patient denies night fever/night sweats, urinary incontinence, bowel incontinence, and significant motor weakness. This is affecting his daily life as he has trouble standing for long durations at work.     Original PAIN SCORES:  Best: Pain is 2  Worst: Pain is 8  Current: Pain is 4     6 weeks of Conservative therapy:  PT: December 2021 - January 2022   Chiro: none  HEP: none currently, states that most recent physical therapy did not help symptoms     Treatments / Medications: (Ice/Heat/NSAIDS/APAP/etc):  Voltaren gel  Lidocaine patches        Interventional Pain Procedures: (Previous injections)  None     report:  Not applicable      Past Medical History:   Diagnosis Date    Acid reflux     Hypercholesteremia     SVT (supraventricular tachycardia)      Past Surgical History:   Procedure Laterality Date    ABDOMINAL HERNIA REPAIR      HEEL SPUR SURGERY Right     HERNIA REPAIR Bilateral     TONSILLECTOMY       Social History     Socioeconomic History    Marital status:    Tobacco Use    Smoking status: Never    Smokeless tobacco: Never   Substance and Sexual Activity    Alcohol use: Not Currently     Alcohol/week: 0.0 standard drinks of alcohol    Drug use: No    Sexual activity: Yes     Partners: Female     Social Determinants of Health     Financial Resource Strain: Low Risk  (8/6/2023)    Overall Financial Resource Strain (CARDIA)     Difficulty of Paying Living Expenses: Not hard at all   Food Insecurity: No Food Insecurity (8/6/2023)    Hunger Vital Sign     Worried About Running Out of Food in the Last Year: Never true     Ran Out of Food in  the Last Year: Never true   Transportation Needs: No Transportation Needs (8/6/2023)    PRAPARE - Transportation     Lack of Transportation (Medical): No     Lack of Transportation (Non-Medical): No   Physical Activity: Insufficiently Active (8/6/2023)    Exercise Vital Sign     Days of Exercise per Week: 3 days     Minutes of Exercise per Session: 30 min   Stress: No Stress Concern Present (8/6/2023)    [employer] - Occupational Stress Questionnaire     Feeling of Stress : Only a little   Social Connections: Moderately Integrated (8/6/2023)    Social Connection and Isolation Panel [NHANES]     Frequency of Communication with Friends and Family: Three times a week     Frequency of Social Gatherings with Friends and Family: Three times a week     Attends Islam Services: 1 to 4 times per year     Active Member of Clubs or Organizations: No     Attends Club or Organization Meetings: Never     Marital Status: Living with partner   Housing Stability: Low Risk  (8/6/2023)    Housing Stability Vital Sign     Unable to Pay for Housing in the Last Year: No     Number of Places Lived in the Last Year: 1     Unstable Housing in the Last Year: No     Family History   Problem Relation Age of Onset    Hyperlipidemia Father        Review of patient's allergies indicates:  No Known Allergies    Current Outpatient Medications   Medication Sig    aspirin 81 MG Chew Take 1 tablet (81 mg total) by mouth once daily.    famotidine (PEPCID) 10 MG tablet Take 1 tablet (10 mg total) by mouth 2 (two) times daily as needed.    LIDOcaine (LIDODERM) 5 % Place 1 patch onto the skin once daily. Remove & Discard patch within 12 hours or as directed by MD    metoprolol tartrate (LOPRESSOR) 25 MG tablet Take 1 tablet (25 mg total) by mouth daily as needed (For palpitations).    omeprazole (PRILOSEC) 40 MG capsule Take 40 mg by mouth 2 (two) times a day.    rosuvastatin (CRESTOR) 10 MG tablet Take 10 mg by mouth  every evening.    sertraline (ZOLOFT) 25 MG tablet Take 25 mg by mouth once daily.    tadalafiL (CIALIS) 10 MG tablet Take 1 tablet (10 mg total) by mouth once daily.     No current facility-administered medications for this visit.       REVIEW OF SYSTEMS:    GENERAL:  No weight loss, malaise or fevers.  HEENT:   No recent changes in vision or hearing  NECK:  Negative for lumps, no difficulty with swallowing.  RESPIRATORY:  Negative for cough, wheezing or shortness of breath, patient denies any recent URI.  CARDIOVASCULAR:  Negative for chest pain, leg swelling or palpitations.  GI:  Negative for abdominal discomfort, blood in stools or black stools or change in bowel habits.  MUSCULOSKELETAL:  See HPI.  SKIN:  Negative for lesions, rash, and itching.  PSYCH:  No mood disorder or recent psychosocial stressors.  Patients sleep is not disturbed secondary to pain.  HEMATOLOGY/LYMPHOLOGY:  Negative for prolonged bleeding, bruising easily or swollen nodes.  Patient is not currently taking any anti-coagulants  NEURO:   No history of headaches, syncope, paralysis, seizures or tremors.  All other reviewed and negative other than HPI.    OBJECTIVE:    General appearance: Well appearing, in no acute distress, alert and oriented x3.  Psych:  Mood and affect appropriate.    Imaging:    MRI Lumbar Spine Without Contrast  Order: [id number]  Status: Final result     Visible to patient: Yes (seen)     Next appt: 08/16/2023 at 03:30 PM in Cardiology (EKG, APPT)     Dx: Degenerative disc disease, lumbar     0 Result Notes  Details    Reading Physician Reading Date Result Priority   Kurtis Ramírez MD  [phone] 4/16/2023 Routine     Narrative & Impression  EXAMINATION:  MRI LUMBAR SPINE WITHOUT CONTRAST     CLINICAL HISTORY:  Low back pain, symptoms persist with > 6wks conservative treatment; Other intervertebral disc degeneration, lumbar region     TECHNIQUE:  Multiplanar, multisequence MR images were acquired from the  thoracolumbar junction to the sacrum without the administration of contrast.     COMPARISON:  None.     FINDINGS:  Lumbar spine alignment demonstrates minimal retrolisthesis of L1 on L2.  No spondylolysis.  Vertebral body heights are well maintained without evidence for fracture.  Benign osseous hemangiomas at the L2 and L5 vertebral bodies.  No marrow signal abnormality to suggest an infiltrative process.     There is degenerative disc desiccation throughout the thoracolumbar spine with mild associated height loss at L4-L5.  Mild L1-L2 and L5-S1 moderate L4-L5 degenerative endplate edema.  Posterior annular fissure at L4-L5.     Distal spinal cord demonstrates normal contour and signal intensity.  Cauda equina appears normal without findings to suggest arachnoiditis.  Conus medullaris terminates at T12-L1.     Paraspinal musculature demonstrates normal bulk and signal intensity.  SI joints are symmetric.  Limited evaluation of the visualized abdominal organs demonstrates no significant abnormalities.     T12-L1: Posterior broad-based disc bulge.  No spinal canal stenosis.  No neural foraminal narrowing.     L1-L2: Minimal retrolisthesis of L1 on L2.  Circumferential disc bulge.  Trace facet joint effusions.  No spinal canal stenosis.  No neural foraminal narrowing.     L2-L3: Posterior broad-based disc bulge.  No spinal canal stenosis.  No neural foraminal narrowing.     L3-L4: Circumferential disc bulge.  Bilateral facet arthropathy and bilateral ligamentum flavum buckling.  Mild left lateral recess stenosis.  No neural foraminal narrowing.     L4-L5: Circumferential disc bulge.  Bilateral facet arthropathy and bilateral ligamentum flavum buckling.  No spinal canal stenosis.  Mild right neural foraminal narrowing.     L5-S1: Circumferential disc bulge.  No spinal canal stenosis.  No neural foraminal narrowing.     Impression:     1. Lumbar degenerative changes contributing to mild right neural foraminal narrowing  at L4-L5.  No spinal canal stenosis  2. Mild-to-moderate degenerative endplate edema at L1-L2, L4-L5 and L5-S1.        Electronically signed by: Kurtis Ramírez MD  Date:                                            04/16/2023  Time:                                           10:07         ASSESSMENT: 62 y.o. year old male with low back pain, consistent with     1. Lumbar radiculopathy  Ambulatory referral/consult to [facility]      2. Vertebrogenic low back pain  Ambulatory referral/consult to [facility]      3. Back pain, unspecified back location, unspecified back pain laterality, unspecified chronicity  Ambulatory referral/consult to [facility]    LIDOcaine (LIDODERM) 5 %            DISCUSSION: Mr. Howell is a  in [place] in overall very good health. He has 3 children who are all physicians. He comes to us with low back pain intermittently over time with bending and lifting that has been increasing in frequency and duration. He notes difficulty rising from a seated position and develops sensory changes in the lateral legs with prolonged standing. No red flag findings on discussion or exam. MRI revealed Modic changes at L4, L5, S1. Patient benefiting from voltaren gel and lidocaine patiches and would like to explore conservative treatment with HBP prior to any interventions.       PLAN:   1) Reviewed MRI  2) Referral to [facility]   3) Prescribed lidocaine patches for low back pain, refill sent to pharmacy  4) RTC after healthy [facility]    The above plan and management options were discussed at length with patient. Patient is in agreement with the above and verbalized understanding. It will be communicated with the referring physician via electronic record, fax, or mail.    Kavin Adam MD  08/14/2023

## 2023-08-16 ENCOUNTER — OFFICE VISIT (OUTPATIENT)
Dept: CARDIOLOGY | Facility: CLINIC | Age: 63
End: 2023-08-16
Payer: COMMERCIAL

## 2023-08-16 ENCOUNTER — HOSPITAL ENCOUNTER (OUTPATIENT)
Dept: CARDIOLOGY | Facility: CLINIC | Age: 63
Discharge: HOME | End: 2023-08-16
Payer: COMMERCIAL

## 2023-08-16 VITALS
SYSTOLIC BLOOD PRESSURE: 130 MMHG | WEIGHT: 245.56 LBS | HEIGHT: 77 IN | DIASTOLIC BLOOD PRESSURE: 80 MMHG | BODY MASS INDEX: 28.99 KG/M2 | OXYGEN SATURATION: 93 % | HEART RATE: 68 BPM

## 2023-08-16 DIAGNOSIS — I47.10 SVT (SUPRAVENTRICULAR TACHYCARDIA): Primary | ICD-10-CM

## 2023-08-16 DIAGNOSIS — R07.9 CHEST PAIN, UNSPECIFIED TYPE: ICD-10-CM

## 2023-08-16 DIAGNOSIS — E78.2 MIXED HYPERLIPIDEMIA: ICD-10-CM

## 2023-08-16 DIAGNOSIS — Z82.49 FHX: SVT (SUPRAVENTRICULAR TACHYCARDIA): ICD-10-CM

## 2023-08-16 PROCEDURE — 93000 ELECTROCARDIOGRAM COMPLETE: CPT | Mod: S$GLB,ICN,, | Performed by: INTERNAL MEDICINE

## 2023-08-16 PROCEDURE — 3079F PR MOST RECENT DIASTOLIC BLOOD PRESSURE 80-89 MM HG: ICD-10-PCS | Mod: CPTII,S$GLB,, | Performed by: STUDENT IN AN ORGANIZED HEALTH CARE EDUCATION/TRAINING PROGRAM

## 2023-08-16 PROCEDURE — 3075F SYST BP GE 130 - 139MM HG: CPT | Mod: CPTII,S$GLB,, | Performed by: STUDENT IN AN ORGANIZED HEALTH CARE EDUCATION/TRAINING PROGRAM

## 2023-08-16 PROCEDURE — 99214 PR OFFICE/OUTPT VISIT, EST, LEVL IV, 30-39 MIN: ICD-10-PCS | Mod: 25,S$GLB,, | Performed by: STUDENT IN AN ORGANIZED HEALTH CARE EDUCATION/TRAINING PROGRAM

## 2023-08-16 PROCEDURE — 3075F PR MOST RECENT SYSTOLIC BLOOD PRESS GE 130-139MM HG: ICD-10-PCS | Mod: CPTII,S$GLB,, | Performed by: STUDENT IN AN ORGANIZED HEALTH CARE EDUCATION/TRAINING PROGRAM

## 2023-08-16 PROCEDURE — 93000 EKG 12-LEAD: ICD-10-PCS | Mod: S$GLB,ICN,, | Performed by: INTERNAL MEDICINE

## 2023-08-16 PROCEDURE — 3008F PR BODY MASS INDEX (BMI) DOCUMENTED: ICD-10-PCS | Mod: CPTII,S$GLB,, | Performed by: STUDENT IN AN ORGANIZED HEALTH CARE EDUCATION/TRAINING PROGRAM

## 2023-08-16 PROCEDURE — 3008F BODY MASS INDEX DOCD: CPT | Mod: CPTII,S$GLB,, | Performed by: STUDENT IN AN ORGANIZED HEALTH CARE EDUCATION/TRAINING PROGRAM

## 2023-08-16 PROCEDURE — 3044F HG A1C LEVEL LT 7.0%: CPT | Mod: CPTII,S$GLB,, | Performed by: STUDENT IN AN ORGANIZED HEALTH CARE EDUCATION/TRAINING PROGRAM

## 2023-08-16 PROCEDURE — 3044F PR MOST RECENT HEMOGLOBIN A1C LEVEL <7.0%: ICD-10-PCS | Mod: CPTII,S$GLB,, | Performed by: STUDENT IN AN ORGANIZED HEALTH CARE EDUCATION/TRAINING PROGRAM

## 2023-08-16 PROCEDURE — 99999 PR PBB SHADOW E&M-EST. PATIENT-LVL III: ICD-10-PCS | Mod: PBBFAC,,, | Performed by: STUDENT IN AN ORGANIZED HEALTH CARE EDUCATION/TRAINING PROGRAM

## 2023-08-16 PROCEDURE — 1159F MED LIST DOCD IN RCRD: CPT | Mod: CPTII,S$GLB,, | Performed by: STUDENT IN AN ORGANIZED HEALTH CARE EDUCATION/TRAINING PROGRAM

## 2023-08-16 PROCEDURE — 99999 PR PBB SHADOW E&M-EST. PATIENT-LVL III: CPT | Mod: PBBFAC,,, | Performed by: STUDENT IN AN ORGANIZED HEALTH CARE EDUCATION/TRAINING PROGRAM

## 2023-08-16 PROCEDURE — 3079F DIAST BP 80-89 MM HG: CPT | Mod: CPTII,S$GLB,, | Performed by: STUDENT IN AN ORGANIZED HEALTH CARE EDUCATION/TRAINING PROGRAM

## 2023-08-16 PROCEDURE — 99214 OFFICE O/P EST MOD 30 MIN: CPT | Mod: 25,S$GLB,, | Performed by: STUDENT IN AN ORGANIZED HEALTH CARE EDUCATION/TRAINING PROGRAM

## 2023-08-16 PROCEDURE — 1159F PR MEDICATION LIST DOCUMENTED IN MEDICAL RECORD: ICD-10-PCS | Mod: CPTII,S$GLB,, | Performed by: STUDENT IN AN ORGANIZED HEALTH CARE EDUCATION/TRAINING PROGRAM

## 2023-08-16 RX ORDER — METOPROLOL SUCCINATE 25 MG/1
25 TABLET, EXTENDED RELEASE ORAL DAILY
Qty: 90 TABLET | Refills: 3 | Status: SHIPPED | OUTPATIENT
Start: 2023-08-16 | End: 2023-08-16

## 2023-08-16 RX ORDER — ROSUVASTATIN CALCIUM 20 MG/1
20 TABLET, COATED ORAL NIGHTLY
Qty: 90 TABLET | Refills: 3 | Status: SHIPPED | OUTPATIENT
Start: 2023-08-16 | End: 2023-08-16

## 2023-08-16 RX ORDER — NITROGLYCERIN 0.4 MG/1
0.4 TABLET SUBLINGUAL EVERY 5 MIN PRN
Qty: 15 TABLET | Refills: 3 | Status: SHIPPED | OUTPATIENT
Start: 2023-08-16 | End: 2023-08-16

## 2023-08-16 RX ORDER — NITROGLYCERIN 0.4 MG/1
0.4 TABLET SUBLINGUAL EVERY 5 MIN PRN
Qty: 15 TABLET | Refills: 3 | Status: SHIPPED | OUTPATIENT
Start: 2023-08-16

## 2023-08-16 RX ORDER — ROSUVASTATIN CALCIUM 20 MG/1
20 TABLET, COATED ORAL NIGHTLY
Qty: 90 TABLET | Refills: 3 | Status: SHIPPED | OUTPATIENT
Start: 2023-08-16 | End: 2024-08-15

## 2023-08-16 RX ORDER — METOPROLOL SUCCINATE 25 MG/1
25 TABLET, EXTENDED RELEASE ORAL DAILY
Qty: 90 TABLET | Refills: 3 | Status: SHIPPED | OUTPATIENT
Start: 2023-08-16 | End: 2024-08-15

## 2023-08-16 NOTE — PROGRESS NOTES
PCP - Francisco Mcnally MD  Referring Physician:     Subjective:   Patient ID:  Valentin Howell is a 62 y.o. y.o. male with paroxysmal SVT, HLD, GERD who presents to cardiology clinic for follow up of SVT. He was last seen in our clinic in 3/2021 by Dr. Ramsey and was given metoprolol tartrate 25mg daily prn at the time.     He recently presented to the ED with multiple episodes of reported crushing substernal chest pressure that came on with activity and resolved with rest (x2). He was admitted. Trop negative x3, EKG with sinus bradycardia, RBBB. TTE with normal wall motion. Regadenoson stress test negative and he was discharged home. No chest pain during the stress test. He was sent home with a cardiac event monitor. No results are available yet for the event monitor.    He describes the chest pain episode to me today. He was on a ladder trimming branches when he got sudden substernal and epigastric heaviness, like 500kg were sitting on his chest. When he rested the chest heaviness resolved over several minutes. His daughter told him to go to the ED that day. He did not go for 2 days, but went when this recurred (was worse than before). Since being home he has not had chest pain but has not exerted himself either, due to musculoskeletal back pain.    He has been wearing an apple watch. He has had maybe 3-4 episodes per year of fast heart rate. Most of these are heart rates in the low 100s while his true SVT episodes in the past were at least 200 bpm. He states his heart rates of 200+ have happened maybe 3-4 times in the past 2.5 years. They didn't really respond to his metoprolol or valsalva maneuver well, but was responsive to carotid massage.         History:     Social History     Tobacco Use    Smoking status: Never    Smokeless tobacco: Never   Substance Use Topics    Alcohol use: Not Currently     Alcohol/week: 0.0 standard drinks of alcohol     Family History   Problem Relation Age of Onset     "Hyperlipidemia Father        Meds:   Review of patient's allergies indicates:  No Known Allergies    Current Outpatient Medications:     aspirin 81 MG Chew, Take 1 tablet (81 mg total) by mouth once daily., Disp: 90 tablet, Rfl: 3    famotidine (PEPCID) 10 MG tablet, Take 1 tablet (10 mg total) by mouth 2 (two) times daily as needed., Disp: 90 tablet, Rfl: 3    LIDOcaine (LIDODERM) 5 %, Place 1 patch onto the skin once daily. Remove & Discard patch within 12 hours or as directed by MD, Disp: 30 patch, Rfl: 2    metoprolol tartrate (LOPRESSOR) 25 MG tablet, Take 1 tablet (25 mg total) by mouth daily as needed (For palpitations)., Disp: 60 tablet, Rfl: 6    omeprazole (PRILOSEC) 40 MG capsule, Take 40 mg by mouth 2 (two) times a day., Disp: , Rfl:     rosuvastatin (CRESTOR) 10 MG tablet, Take 10 mg by mouth every evening., Disp: , Rfl:     sertraline (ZOLOFT) 25 MG tablet, Take 25 mg by mouth once daily., Disp: , Rfl:     tadalafiL (CIALIS) 10 MG tablet, Take 1 tablet (10 mg total) by mouth once daily., Disp: 90 tablet, Rfl: 3    Review of Systems   Constitutional:  Negative for diaphoresis and fever.   HENT:  Negative for congestion and sore throat.    Eyes:  Negative for blurred vision and discharge.   Respiratory:  Negative for cough and shortness of breath.    Cardiovascular:  Negative for chest pain and palpitations.   Gastrointestinal:  Negative for nausea and vomiting.   Genitourinary:  Negative for dysuria and hematuria.   Musculoskeletal:  Negative for joint pain and myalgias.   Skin:  Negative for itching and rash.   Neurological:  Negative for weakness and headaches.       Objective:   /80   Pulse 68   Ht 6' 5" (1.956 m)   Wt 111.4 kg (245 lb 9.5 oz)   SpO2 (!) 93%   BMI 29.12 kg/m²   Physical Exam  Vitals reviewed.   Constitutional:       Appearance: He is not ill-appearing.   HENT:      Head: Normocephalic and atraumatic.   Cardiovascular:      Rate and Rhythm: Normal rate and regular rhythm. "      Heart sounds: Normal heart sounds.   Pulmonary:      Effort: Pulmonary effort is normal. No respiratory distress.      Breath sounds: No rales.   Abdominal:      General: Bowel sounds are normal.      Palpations: Abdomen is soft.      Tenderness: There is no abdominal tenderness.   Musculoskeletal:         General: No swelling or tenderness. Normal range of motion.      Cervical back: Normal range of motion and neck supple. No rigidity.      Right lower leg: No edema.      Left lower leg: No edema.   Skin:     General: Skin is warm and dry.   Neurological:      Mental Status: He is alert.         Labs:     Lab Results   Component Value Date     08/07/2023    K 4.1 08/07/2023     08/07/2023    CO2 22 (L) 08/07/2023    BUN 19 08/07/2023    CREATININE 0.8 08/07/2023    ANIONGAP 10 08/07/2023     Lab Results   Component Value Date    HGBA1C 5.4 03/06/2023     Lab Results   Component Value Date    BNP 32 08/05/2023       Lab Results   Component Value Date    WBC 9.48 08/05/2023    HGB 15.3 08/05/2023    HCT 44.4 08/05/2023     08/05/2023    GRAN 6.1 08/05/2023    GRAN 64.5 08/05/2023     Lab Results   Component Value Date    CHOL 182 03/06/2023    HDL 57 03/06/2023    LDLCALC 107.8 03/06/2023    TRIG 86 03/06/2023       Lab Results   Component Value Date     08/07/2023    K 4.1 08/07/2023     08/07/2023    CO2 22 (L) 08/07/2023    BUN 19 08/07/2023    CREATININE 0.8 08/07/2023    ANIONGAP 10 08/07/2023     Lab Results   Component Value Date    HGBA1C 5.4 03/06/2023     Lab Results   Component Value Date    BNP 32 08/05/2023    Lab Results   Component Value Date    WBC 9.48 08/05/2023    HGB 15.3 08/05/2023    HCT 44.4 08/05/2023     08/05/2023    GRAN 6.1 08/05/2023    GRAN 64.5 08/05/2023     Lab Results   Component Value Date    CHOL 182 03/06/2023    HDL 57 03/06/2023    LDLCALC 107.8 03/06/2023    TRIG 86 03/06/2023                Cardiovascular Imaging:     EKG: Sinus with  RBBB    Echo:   EF   Date Value Ref Range Status   04/15/2021 60 % Final     Nuc Stress EF   Date Value Ref Range Status   06/26/2020 68 % Final     Nuc Rest EF   Date Value Ref Range Status   06/26/2020 60  Final       TTE 8/5/23:    Left Ventricle: The left ventricle is normal in size. Ventricular mass is normal. Normal wall thickness. There is concentric remodeling. Normal wall motion. There is normal systolic function with a visually estimated ejection fraction of 55 - 70%. There is normal diastolic function.    Left Atrium: Left atrium is mildly dilated.    Right Ventricle: Normal right ventricular cavity size. Systolic function is normal.    Aortic Valve: Mild annular calcification.    IVC/SVC: Normal venous pressure at 3 mmHg.    Nuclear (regadenoson) stress 8/7/23:    The ECG portion of the study is negative for ischemia.    The patient reported no chest pain during the stress test.    There were no arrhythmias during stress.    The nuclear portion of this study will be reported separately. The patient was infused intravenously with regadenoson at 0.08 mg/ml for a total duration of 10 seconds. A total regadenoson dose of 0.4 mg was injected. The test was stopped because the end of the protocol was reached.    Baseline ECG: The Baseline ECG reveals sinus bradycardia with RBBB. The axis is normal.    Stress ECG: There are no ST segment deviation identified during the protocol. There are no arrhythmias during stress. There is normal blood pressure response with stress.    The ECG portion of the study is negative for ischemia.  Nuclear Stress Imaging Impression:     1. No convincing scintigraphic evidence for ischemia or infarct.     2. The global left ventricular systolic function is normal with an LV ejection            fraction of 62 % noting mild LV dilatation. Wall motion is normal.      Assessment & Plan:     1. SVT (supraventricular tachycardia)    2. Mixed hyperlipidemia    3. Chest pain, unspecified  type        #SVT  Stable from an SVT standpoint  Aborts rare SVT (maybe once yearly) with carotid massage successfully  Will consider ablation if more frequent, not aborting, or bothering patient more symptomatically    #HLD   on Rosuvastatin 10mg qd  ASCVD 8.4%  Increase Rosuvastatin to 20mg qd. Previously had muscle cramps with Lipitor, keep an eye on symptoms with Crestor increase.    #Chest pain  Story concerning for angina. No proven CAD at this time. Trop negative x3 in ED, EKG with baseline RBBB, SPECT negative.  ASA, statin  Lipid control with increased Crestor  Adding Toprol XL 25mg qd  NTG prn for anginal pain  BP at goal. BP diary at home to ensure ( today)    Attempting to medically manage anginal pain after negative SPECT. If still having angina despite medical mgmt, consider LHC.      Discussed plan and saw the patient with Dr. Ramsey  RTC with Dr. Ramsey in 6-8 weeks (pt request due to travel). Continue assessing symptom control. Repeat Lipid panel at that time. Goal <55.      Connor Gillies, DO  PGY-IV, Cardiology

## 2023-08-17 NOTE — PROGRESS NOTES
I have reviewed the notes, assessments, and/or procedures performed by Dr Gillies, I concur with her/his documentation of Valentin Howell.   Discussed no use of tadalafil with NTG.

## 2023-10-23 ENCOUNTER — CLINICAL SUPPORT (OUTPATIENT)
Dept: REHABILITATION | Facility: OTHER | Age: 63
End: 2023-10-23
Payer: COMMERCIAL

## 2023-10-23 DIAGNOSIS — M25.69 DECREASED ROM OF TRUNK AND BACK: Primary | ICD-10-CM

## 2023-10-23 DIAGNOSIS — R68.89 DECREASED FUNCTIONAL ACTIVITY TOLERANCE: ICD-10-CM

## 2023-10-23 DIAGNOSIS — M54.16 LUMBAR RADICULOPATHY: ICD-10-CM

## 2023-10-23 DIAGNOSIS — M54.9 BACK PAIN, UNSPECIFIED BACK LOCATION, UNSPECIFIED BACK PAIN LATERALITY, UNSPECIFIED CHRONICITY: ICD-10-CM

## 2023-10-23 DIAGNOSIS — M54.51 VERTEBROGENIC LOW BACK PAIN: ICD-10-CM

## 2023-10-23 PROCEDURE — 97112 NEUROMUSCULAR REEDUCATION: CPT

## 2023-10-23 PROCEDURE — 97161 PT EVAL LOW COMPLEX 20 MIN: CPT

## 2023-10-23 PROCEDURE — 97530 THERAPEUTIC ACTIVITIES: CPT

## 2023-10-23 NOTE — PLAN OF CARE
[facility]  Physical Therapy Lumbar Evaluation      Name: Valentin Jensen Hajj  Clinic Number: [id number]    Therapy Diagnosis:   Encounter Diagnoses   Name Primary?    Lumbar radiculopathy     Vertebrogenic low back pain     Back pain, unspecified back location, unspecified back pain laterality, unspecified chronicity     Decreased ROM of trunk and back Yes    Decreased functional activity tolerance      Physician: Kavin Adam MD    Physician Orders: PT Eval and Treat  Medical Diagnosis from Referral:   1. Decreased ROM of trunk and back    2. Lumbar radiculopathy    3. Vertebrogenic low back pain    4. Back pain, unspecified back location, unspecified back pain laterality, unspecified chronicity    5. Decreased functional activity tolerance      Evaluation Date: 10/23/2023  Authorization Period Expiration: 8/13/2023  Plan of Care Expiration: 1/23/2024  Reassessment Due: 11/23/2023  Visit # / Visits authorized: 1/1    Time In: 10:10  Time Out: 11:30  Total Billable Time: 80 minutes  INSURANCE and OUTCOMES: Fee for Service with FOTO Outcomes 1/3    Precautions: standard [place] on L ear    Pattern of pain determined: 1 Movement responder    Subjective     Date of onset: 10 + years  History of current condition: Valentin reports He has been here (healthy back) to before and once he starts feeling better he stopped and stayed without back pain for one year and could lift anything. The other times it didn't help as much. The back pain is up and down. Sometimes it's to bad he had difficulty climbing stairs and has to pull himself up with the railing and there are times where he has to sleep early because the back hurts and sleeping helps. Bilat low back pain but more on the R side. If he stands too long (about 1 hour) he will feel discomfort down into the leg. About a month ago he was visiting his daughter and maybe did some heavy lifting and couldn't walk for 3 days. He used some topical  creams, pain patch, and massage the back. There are times when it will shoot down into the legs in the winter when he is sleeping. With prolonged standing the legs feel like they they get numb. Not confidnet in his movements because he doesn't trust his body    PER MD:  Interval History (8/14/23):  Valentin Howell presents tele-medicine appointment for a follow-up appointment for low back pain. Since the last visit, Valentin Howell states the pain has been persistant. Patient continues to endorse low back pain with radicular symptoms (R>L) that extends from his low back down the posterior lateral aspects of is legs to his calves bilaterally. Patient reports significant pain over the weekend after driving to [place] with difficulty walking after the drive. His son was able to get him a patch (patient unsure what kind of patch, but sounds like lidocaine patch) for the ride home which significantly improved his pain and he is doing well at this time. Current pain intensity is 4/10. Patient denies red flags including weakness, unexpected weight loss/gain, night sweats/fevers, saddle anesthesia, and symptoms of CATY.        Medical History:   Past Medical History:   Diagnosis Date    Acid reflux     Hypercholesteremia     SVT (supraventricular tachycardia)        Surgical History:   Valentin Howell  has a past surgical history that includes Tonsillectomy; Abdominal hernia repair; Hernia repair (Bilateral); and Heel spur surgery (Right).    Medications:   Valentin has a current medication list which includes the following prescription(s): aspirin, famotidine, lidocaine, metoprolol succinate, nitroglycerin, omeprazole, rosuvastatin, sertraline, and tadalafil.    Allergies:   Review of patient's allergies indicates:  No Known Allergies     Imaging: MRI 3/2023  EXAMINATION:  MRI LUMBAR SPINE WITHOUT CONTRAST     CLINICAL HISTORY:  Low back pain, symptoms persist with > 6wks conservative treatment; Other intervertebral disc  degeneration, lumbar region     TECHNIQUE:  Multiplanar, multisequence MR images were acquired from the thoracolumbar junction to the sacrum without the administration of contrast.     COMPARISON:  None.     FINDINGS:  Lumbar spine alignment demonstrates minimal retrolisthesis of L1 on L2.  No spondylolysis.  Vertebral body heights are well maintained without evidence for fracture.  Benign osseous hemangiomas at the L2 and L5 vertebral bodies.  No marrow signal abnormality to suggest an infiltrative process.     There is degenerative disc desiccation throughout the thoracolumbar spine with mild associated height loss at L4-L5.  Mild L1-L2 and L5-S1 moderate L4-L5 degenerative endplate edema.  Posterior annular fissure at L4-L5.     Distal spinal cord demonstrates normal contour and signal intensity.  Cauda equina appears normal without findings to suggest arachnoiditis.  Conus medullaris terminates at T12-L1.     Paraspinal musculature demonstrates normal bulk and signal intensity.  SI joints are symmetric.  Limited evaluation of the visualized abdominal organs demonstrates no significant abnormalities.     T12-L1: Posterior broad-based disc bulge.  No spinal canal stenosis.  No neural foraminal narrowing.     L1-L2: Minimal retrolisthesis of L1 on L2.  Circumferential disc bulge.  Trace facet joint effusions.  No spinal canal stenosis.  No neural foraminal narrowing.     L2-L3: Posterior broad-based disc bulge.  No spinal canal stenosis.  No neural foraminal narrowing.     L3-L4: Circumferential disc bulge.  Bilateral facet arthropathy and bilateral ligamentum flavum buckling.  Mild left lateral recess stenosis.  No neural foraminal narrowing.     L4-L5: Circumferential disc bulge.  Bilateral facet arthropathy and bilateral ligamentum flavum buckling.  No spinal canal stenosis.  Mild right neural foraminal narrowing.     L5-S1: Circumferential disc bulge.  No spinal canal stenosis.  No neural foraminal narrowing.      Impression:     1. Lumbar degenerative changes contributing to mild right neural foraminal narrowing at L4-L5.  No spinal canal stenosis  2. Mild-to-moderate degenerative endplate edema at L1-L2, L4-L5 and L5-S1.       Prior Therapy: [facility] x2 up to 4-5 visits  Prior Treatment: None  Social History:  lives with their spouse  Occupation: Construction - doing small jobs  Leisure: used to be active in sports, sometimes fishing      Prior Level of Function: I with ADL  Current Level of Function: Worse with a flair, difficult with prolonged standing  DME owned/used: Treadmill. Bike, bench  Gym Membership: no    Pain:  Current 3/10, worst 6/10, best 2/10   Location: bilateral low back  Description: Burning  Aggravating Factors: Standing too long, sudden movements, lifting heavy, sitting on soft surface, sleeping on soft mattress  Easing Factors: massage, laying down, sitting with good posture  Disturbed Sleep: varies, difficulty sleeping on sides    Pattern of pain questions:  1.  Where is your pain the worst? back  2.  Is your pain constant or intermittent? constant  3.  Does bending forward make your typical pain worse? yes  4.  Since the start of your back pain, has there been a change in your bowel or bladder? no  5.  What can't you do now that you use to be able to do? Standing, bending to lift heavy items    Pts goals: To make the back better    Red Flag Screening:   Cough/Sneeze Strain: (--)  Bladder/Bowel: (--)  Falls: (--)  Night pain: (--)  Unexplained weight loss: (--)  General health: Good    Objective      Postural examination/scapula alignment: Rounded shoulder and Head forward  Joint integrity: Firm end feeling  Skin integrity:WNL   Edema: None  Correction of posture: better with lumbar roll  Sitting: fair  Standing: fair    GAIT:  Assistive Device used: none  Level of Assistance: independent  Patient displays the following gait deviations: no gait deviations observed.    MOVEMENT LOSS - Lumbar   Norms ROM  Loss Initial   Flexion Fingers touch toes, sacral angle >/= 70 deg, uniform spinal curvature, posterior weight shift  minimal loss   Extension ASIS surpasses toes, spine of scapulae surpasses heels, uniform spinal curve moderate loss   Side glide Right  minimal loss   Side glide Left  moderate loss P! R   Rotation Right PT observes contralateral shoulder within functional limits   Rotation Left PT observes contralateral shoulder within functional limits     Lower Extremity Strength  Right LE  Left LE    Hip flexion: 4+/5 Hip flexion: 4/5   Hip extension:  4/5 Hip extension: 4/5   Hip abduction: 4/5 Hip abduction: 4/5   Hip adduction:  5/5 Hip adduction:  5/5   Knee Flexion 5/5 Knee Flexion 5/5   Knee Extension 5/5 Knee Extension 5/5   Ankle dorsiflexion: 5/5 Ankle dorsiflexion: 5/5   Ankle plantarflexion: 5/5 Ankle plantarflexion: 5/5       Special Tests:   Test Name  Test Result   Prone Instability Test (--)   SI Joint Provocation Test (--)   Thigh thrust (--)   Sacral Thrust (--)   Straight Leg Raise (--)   Neural Tension Test (--)     NEUROLOGICAL SCREENING:     Sensory deficits:  Intact to light touch    Reflexes:    Left Right   Patella Tendon 2+ 2+   Clonus (--) (--)     REPEATED TEST MOVEMENTS:    Baseline symptoms:  Repeated Flexion in Standing better mobility, no pain   Repeated Extension in Standing better   Repeated Flexion in lying better   Repeated Extension in lying  better       STATIC TESTS and other movements:   Prone lie better   Prone lie on elbows better   Sitting slouched  no effect   Sitting erect no effect       Baseline Isometric Testing on Med X equipment: Testing administered by PT    Date of testing: 10/23/2023  ROM 0-54 deg   Max Peak Torque 202    Min Peak Torque 101    Flex/Ext Ratio 2:1   % below normative data 20     Intake/Outcome for FOTO Lumbar Surveys    Therapist reviewed FOTO scores for Valentin Terantasha on 10/23/2023  FOTO documents entered into [employer] - see Media section.    Intake  Score: 32%   Visit 5 Score:   Visit 10 Score:   Discharge Score:   Goal Score: 25%       Treatment     Treatment Time In: 11:00  Treatment Time Out: 11:30  Total Treatment time separate from Evaluation: 30 minutes      Valentin received neuromuscular education to engage spinal musculature correctly for motor control and engagement of musculature for 15 minutes including the MedX exercise component and practice and standard testing. MedX dynamic exercise and baseline isometric test performed with instructions to guide the patient safely through the testing procedure. Patient instructed to perform isometric test correctly and safely while building to an optimal force with a pain-free effort. Patient also instructed that he should feel support/pressure from MedX restraints but no pain/discomfort. Patient demonstrated appropriate understanding of information.         10/23/2023     1:01 PM   [facility]   Visit Number 1   VAS Pain Rating 4   Lumbar Stretches - Slouch Overcorrection 10   Extension in Lying 10   Extension in Standing 10   Flexion in Lying 10   Lumbar Extension Seat Pad 0   Femur Restraint 6   Top Dead Center 24   Counterweight 249   Lumbar Flexion 54   Lumbar Extension 0   Lumbar Peak Torque 202 ft. lbs.   Min Torque 101   Test Percent Below Normative Data 20 %   Ice - Z Lie (in min.) 10          Therapeutic Education/Activity provided for 10 minutes:   - Patient was given an [facility] Visit 1 handout which discusses the following:  - what to expect in therapy  - an overview of the program, including health coaching and wellness  - importance of spinal hygiene, proper posture, lifting mechanics, sleep quality, and nutrition/hydration   - Jose jauregui trialed, recommended, and purchase information was provided.  - Patient received a handout regarding anticipated muscular soreness following the isometric test and strategies for management were reviewed with patient including stretching,  using ice and scheduled rest.   - Patient received verbal education on the following:   - [facility] program   - purpose of the isometric test  - safe progression of lumbar strengthening, wellness approach, and systemic strengthening.   - safe usage of MedX machine and testing protocols.    Valentin received cold pack for 10 minutes to low back in Z-lie.    Written Home Exercises Provided: yes.    HEP AS FOLLOWS:  Extension in Lying   Extension in standing   Flexion in lying   Bridges    Exercises were reviewed and Valentin was able to demonstrate them prior to the end of the session.  Valentin demonstrated good  understanding of the education provided.     See EMR under Patient Instructions for exercises provided 10/23/2023.    Assessment     Valentin is a 63 y.o. male referred to [facility] with a medical diagnosis of M54.9 (ICD-10-CM) - Back pain, unspecified back location, unspecified back pain laterality, unspecified chronicity , M54.51 (ICD-10-CM) - Vertebrogenic low back pain , M54.16 (ICD-10-CM) - Lumbar radiculopathy . Pt presents with complaints of constant low back pain that gets worse with lifting or prolonged standing. He has some limitations on mobility with pain in L side gliding (closing R). He did well with the repeated movements in standing, supine, and prone.He was able to decrease pain and increased strength in the LLE with repeated movements. He was able to test on the lumbar medx and is 20% below averages, discussed there was the last point where he used his legs. Plan to work on mobility, strength, and heavy education to continue with stretches at home.     Pain Pattern:  Movement responder       Pt prognosis is Excellent.     Pt will benefit from skilled outpatient Physical Therapy to address the deficits stated above and in the chart below, to provide pt/family education, and to maximize pt's level of independence. Based on the above history and physical examination an active physical  therapy program is recommended.      Plan of care discussed with patient: Yes  Pt's spiritual, cultural and educational needs considered and patient is agreeable to the plan of care and goals as stated below:     Anticipated Barriers for therapy: None    PT Evaluation Completed? Yes    Medical necessity is demonstrated by the following problem list:    History  Co-morbidities and personal factors that may impact the plan of care [x] LOW: no personal factors / co-morbidities  [] MODERATE: 1-2 personal factors / co-morbidities  [] HIGH: 3+ personal factors / co-morbidities    Moderate / High Support Documentation:   Co-morbidities affecting plan of care: None    Personal Factors:   lifestyle     Examination  Body Structures and Functions, activity limitations and participation restrictions that may impact the plan of care [] LOW: addressing 1-2 elements  [x] MODERATE: 3+ elements  [] HIGH: 4+ elements (please support below)    Moderate / High Support Documentation: Static standing, sitting, heavy lifting     Clinical Presentation [x] LOW: stable  [] MODERATE: Evolving  [] HIGH: Unstable     Decision Making/ Complexity Score: low         GOALS: Pt is in agreement with the following goals.    Short term goals:  6 weeks or 10 visits     - Pt will demonstrate increased MedX average isometric strength value by 12% from initial test resulting in improved ability to perform bending, lifting, and carrying activities safely, confidently. Appropriate and Ongoing  - Pt will report a reduction in worst pain score by 1-2 points for improved tolerance for sitting tolerance . Appropriate and Ongoing  - Pt able to perform HEP correctly with minimal cueing or supervision from therapist to encourage independent management of symptoms. Appropriate and Ongoing    Long term goals: 10 weeks or 20 visits   - Pt will demonstrate increased lumbar ROM by at least 3 degrees from initial ROM value, resulting in improved ability to perform  functional forward bending while standing and sitting. Appropriate and Ongoing  - Pt will demonstrate increased MedX average isometric strength value by 20% from initial test resulting in improved ability to perform bending, lifting, and carrying activities safely and confidently. Appropriate and Ongoing  - Pt to demonstrate ability to independently control and reduce their pain through posture positioning and mechanical movements throughout a typical day. Appropriate and Ongoing  - Pt will demonstrate reduced pain and improved functional outcomes as reported on the Oswestry Disability Index by reaching a score of 25 or less in order to demonstrate subjective improvement in pt's condition.   Appropriate and Ongoing  - Pt will demonstrate independence with the HEP at discharge. Appropriate and Ongoing  - Pt will be able to confidently lift a case of water without increase in low back pain (patient goal) Appropriate and Ongoing    Plan     Outpatient physical therapy 2x week for 10 weeks or 20 visits to include the following:   - Patient education  - Therapeutic exercise  - Manual therapy  - Performance testing   - Neuromuscular Re-education  - Therapeutic activity   - Modalities    Pt may be seen by PTA as part of the rehabilitation team.     Therapist: David Norris, PT  10/23/2023

## 2023-10-26 ENCOUNTER — CLINICAL SUPPORT (OUTPATIENT)
Dept: REHABILITATION | Facility: HOSPITAL | Age: 63
End: 2023-10-26
Payer: COMMERCIAL

## 2023-10-26 DIAGNOSIS — R29.898 DECREASED STRENGTH OF TRUNK AND BACK: Primary | ICD-10-CM

## 2023-10-26 DIAGNOSIS — R68.89 DECREASED FUNCTIONAL ACTIVITY TOLERANCE: ICD-10-CM

## 2023-10-26 PROCEDURE — 97110 THERAPEUTIC EXERCISES: CPT

## 2023-10-26 PROCEDURE — 97112 NEUROMUSCULAR REEDUCATION: CPT

## 2023-10-26 NOTE — PROGRESS NOTES
[facility] Back Physical Therapy Treatment      Name: Valentin Jensen Hajj  Clinic Number: [id number]    Therapy Diagnosis:   Encounter Diagnoses   Name Primary?    Decreased strength of trunk and back Yes    Decreased functional activity tolerance      Physician: Kavin Adam MD    Visit Date: 10/26/2023    Physician Orders: PT Eval and Treat  Medical Diagnosis:    Lumbar radiculopathy      Vertebrogenic low back pain      Back pain, unspecified back location, unspecified back pain laterality, unspecified chronicity    Evaluation Date: 10/23/2023  Authorization Period Expiration: 12/31/2023  Reassessment Due: 11/23/2023  Plan of Care Certification Period: 1/23/2024  Visit #/Visits authorized: 2/ 20     Time In: 1:40 PM  Time Out: 2:35 PM  Total Billable Time: 50 minutes  INSURANCE and OUTCOMES: Fee for Service with FOTO Outcomes 1/3    Precautions: standard, [place] on L ear    Pattern of pain determined: 1 Movement responder    Subjective   Valentin reports back pain continues to come and go; usually worse in morning time. He reports he drove to and from [place] earlier today and is not feeling any back pain currently.     Patient reports tolerating previous visit   Patient reports their pain to be 0/10 on a 0-10 scale with 0 being no pain and 10 being the worst pain imaginable.  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, sometimes fishing  Pt goals: To make the back better    Objective     Baseline Isometric Testing on Med X equipment: Testing administered by PT     Date of testing: 10/23/2023  ROM 0-54 deg   Max Peak Torque 202    Min Peak Torque 101    Flex/Ext Ratio 2:1   % below normative data 20     Outcomes:  Initial score: 32% limitation  Visit 5 score:  Visit 10 score:  Goal: 25% limitation     Treatment    Valentin received the treatments listed below:      Valentin received neuromuscular education  to isolate and engage spinal  stabilization musculature correctly for motor control and coordination to aid in function and posture for 10 minutes on the Medical Medx Machine.  Patient performed MedX dynamic exercise with emphasis on spinal muscular control using pacer throughout  active range of motion. Therapist assisted patient in achieving optimal exertion for neural reeducation and endurance training by using the  Ehsan Exertion Rating scale, by instructing the patient to aim for mid range of exertion, performing 15-20 repetitions, slowly, correctly,and safely.           10/26/2023     3:58 PM   [facility]   Visit Number 2   VAS Pain Rating 0   Treadmill Time (in min.) 5 min   Extension in Lying 10   Extension in Standing 10   Flexion in Lying 10   Lumbar Weight 80 lbs   Repetitions 20   Rating of Perceived Exertion 2   Ice - Z Lie (in min.) 5     therapeutic exercises to develop strength, endurance, ROM, and core stabilization for 40 minutes including:    LTR x10  DKTC 5 sec hold x10  PPT 3 sec hold x10 (cues)  Bridges x10  EIL x10  EIS x10      Peripheral muscle strengthening which included 1 set of 15-20 repetitions at a slow, controlled 10-13 second per rep pace focused on strengthening supporting musculature for improved body mechanics and functional mobility.  Pt and therapist focused on proper form during treatment to ensure optimal strengthening of each targeted muscle group.  Machines were utilized including torso rotation, leg press, hip abd and hip add, leg ext.  Leg curl, triceps, biceps, chest and row added visit 3    therapeutic activities to improve functional performance for 00  minutes, including:    manual therapy techniques:  were applied to the:  for 00 minutes, including:       cold pack for 5 minutes to low back in Z-lie.    Home Exercises Provided and Patient Education Provided   Home exercises include: DKTC, bridges, EIL, EIS  Cardio program: visit 5  Lifting education date: visit 11  Posture/Lumbar roll:    Fridge Magnet Discharge handout (date given):  Equipment at home/gym membership:       Education provided:   - cues with exercises  - MedX pacing  - RPE scale     Written Home Exercises Provided: Patient instructed to cont prior HEP.  Exercises were reviewed and Valentin was able to demonstrate them prior to the end of the session.  Valentin demonstrated good  understanding of the education provided.     See EMR under Patient Instructions for exercises provided prior visit.    Assessment   Pt presents to second healthy back visit reporting no c/o back pain currently, he was able to demo HEP with Mod VC for form. Added PPT and LTR which he tolerated well. Pt was able to start neuro reeducation training, strengthening, and endurance training on the lumbar MedX at 80 ft/lbs. Pt was able to complete 20 reps, with 2/10 RPE. Pt was also able to complete half of the peripheral strengthening exercises without increased discomfort and will complete the complete circuit next visit as tolerated. Increase MedX by 10% next visit.       Patient is making  progress towards established goals.  Pt will continue to benefit from skilled outpatient physical therapy to address the deficits stated in the impairment chart, provide pt/family education and to maximize pt's level of independence in the home and community environment.     Anticipated Barriers for therapy: none  Pt's spiritual, cultural and educational needs considered and pt agreeable to plan of care and goals as stated below:     Goals:     Short term goals:  6 weeks or 10 visits      - Pt will demonstrate increased MedX average isometric strength value by 12% from initial test resulting in improved ability to perform bending, lifting, and carrying activities safely, confidently. Appropriate and Ongoing  - Pt will report a reduction in worst pain score by 1-2 points for improved tolerance for sitting tolerance . Appropriate and Ongoing  - Pt able to perform HEP correctly with  minimal cueing or supervision from therapist to encourage independent management of symptoms. Appropriate and Ongoing     Long term goals: 10 weeks or 20 visits   - Pt will demonstrate increased lumbar ROM by at least 3 degrees from initial ROM value, resulting in improved ability to perform functional forward bending while standing and sitting. Appropriate and Ongoing  - Pt will demonstrate increased MedX average isometric strength value by 20% from initial test resulting in improved ability to perform bending, lifting, and carrying activities safely and confidently. Appropriate and Ongoing  - Pt to demonstrate ability to independently control and reduce their pain through posture positioning and mechanical movements throughout a typical day. Appropriate and Ongoing  - Pt will demonstrate reduced pain and improved functional outcomes as reported on the Oswestry Disability Index by reaching a score of 25 or less in order to demonstrate subjective improvement in pt's condition.   Appropriate and Ongoing  - Pt will demonstrate independence with the HEP at discharge. Appropriate and Ongoing  - Pt will be able to confidently lift a case of water without increase in low back pain (patient goal) Appropriate and Ongoing      Plan   Continue with established Plan of Care towards established PT goals.       Therapist: Renetta Juarez, PT  10/26/2023

## 2023-10-31 ENCOUNTER — CLINICAL SUPPORT (OUTPATIENT)
Dept: REHABILITATION | Facility: HOSPITAL | Age: 63
End: 2023-10-31
Payer: COMMERCIAL

## 2023-10-31 DIAGNOSIS — R29.898 DECREASED STRENGTH OF TRUNK AND BACK: Primary | ICD-10-CM

## 2023-10-31 DIAGNOSIS — R68.89 DECREASED FUNCTIONAL ACTIVITY TOLERANCE: ICD-10-CM

## 2023-10-31 PROCEDURE — 97112 NEUROMUSCULAR REEDUCATION: CPT

## 2023-10-31 PROCEDURE — 97110 THERAPEUTIC EXERCISES: CPT

## 2023-10-31 NOTE — PROGRESS NOTES
[facility] Back Physical Therapy Treatment      Name: Valentin Jensen Hajj  Clinic Number: [id number]    Therapy Diagnosis:   Encounter Diagnoses   Name Primary?    Decreased strength of trunk and back Yes    Decreased functional activity tolerance      Physician: Kavin Adam MD    Visit Date: 10/31/2023    Physician Orders: PT Eval and Treat  Medical Diagnosis:    Lumbar radiculopathy      Vertebrogenic low back pain      Back pain, unspecified back location, unspecified back pain laterality, unspecified chronicity    Evaluation Date: 10/23/2023  Authorization Period Expiration: 12/31/2023  Reassessment Due: 11/23/2023  Plan of Care Certification Period: 1/23/2024  Visit #/Visits authorized: 3/ 20     Time In: 2:30 PM  Time Out: 3:30 PM  Total Billable Time: 60 minutes  INSURANCE and OUTCOMES: Fee for Service with FOTO Outcomes 1/3    Precautions: standard, [place] on L ear    Pattern of pain determined: 1 Movement responder    Subjective   Valentin reports he drove to and from [place] this past weekend and had increased back pain with the prolonged driving. He put a pain patch on his back yesterday which helped to relieve the pain.     Patient reports tolerating previous visit   Patient reports their pain to be 0/10 on a 0-10 scale with 0 being no pain and 10 being the worst pain imaginable.  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, sometimes fishing  Pt goals: To make the back better    Objective     Baseline Isometric Testing on Med X equipment: Testing administered by PT     Date of testing: 10/23/2023  ROM 0-54 deg   Max Peak Torque 202    Min Peak Torque 101    Flex/Ext Ratio 2:1   % below normative data 20     Outcomes:  Initial score: 32% limitation  Visit 5 score:  Visit 10 score:  Goal: 25% limitation     Treatment    Valentin received the treatments listed below:      Valentin received neuromuscular education  to isolate and engage spinal  stabilization musculature correctly for motor control and coordination to aid in function and posture for 10 minutes on the Medical Medx Machine.  Patient performed MedX dynamic exercise with emphasis on spinal muscular control using pacer throughout  active range of motion. Therapist assisted patient in achieving optimal exertion for neural reeducation and endurance training by using the  Ehsan Exertion Rating scale, by instructing the patient to aim for mid range of exertion, performing 15-20 repetitions, slowly, correctly,and safely.           10/31/2023     3:44 PM   [facility]   Visit Number 3   VAS Pain Rating 0   Treadmill Time (in min.) 5 min   Extension in Lying 10   Extension in Standing 10   Flexion in Lying 10   Lumbar Weight 90 lbs   Repetitions 18   Rating of Perceived Exertion 4        therapeutic exercises to develop strength, endurance, ROM, and core stabilization for 50 minutes including:    LTR x10  DKTC c/ SB 5 sec hold x10  PPT 3 sec hold x10 (cues)  Bridges x10  +SL clamshells x10  EIL x10  EIS x10      Peripheral muscle strengthening which included 1 set of 15-20 repetitions at a slow, controlled 10-13 second per rep pace focused on strengthening supporting musculature for improved body mechanics and functional mobility.  Pt and therapist focused on proper form during treatment to ensure optimal strengthening of each targeted muscle group.  Machines were utilized including torso rotation, leg press, hip abd and hip add, leg ext.  Leg curl, triceps, biceps, chest and row added visit 3    therapeutic activities to improve functional performance for 00  minutes, including:    manual therapy techniques:  were applied to the:  for 00 minutes, including:       cold pack for 5 minutes to low back in Z-lie.    Home Exercises Provided and Patient Education Provided   Home exercises include: DKTC, bridges, EIL, EIS  Cardio program: visit 5  Lifting education date: visit 11  Posture/Lumbar roll:    Fridge Magnet Discharge handout (date given):  Equipment at home/gym membership:       Education provided:   - cues with exercises  - MedX pacing  - RPE scale     Written Home Exercises Provided: Patient instructed to cont prior HEP.  Exercises were reviewed and Valentin was able to demonstrate them prior to the end of the session.  Valentin demonstrated good  understanding of the education provided.     See EMR under Patient Instructions for exercises provided prior visit.    Assessment   Valentin returns with no c/o back pain upon arrival to session. Treatment continued with lumbopelvic mobility, neuro re-education, and strengthening exercises. Added sidelying clamshells which pt tolerated well. Lumbar MedX resistance was increased to 90 ft/lbs with pt completing 18 reps with RPE = 4/10. Pt was also able to complete the full peripheral strengthening exercises without c/o increased pain. Will continue to progress per HB protocol and pt's tolerance.       Patient is making  progress towards established goals.  Pt will continue to benefit from skilled outpatient physical therapy to address the deficits stated in the impairment chart, provide pt/family education and to maximize pt's level of independence in the home and community environment.     Anticipated Barriers for therapy: none  Pt's spiritual, cultural and educational needs considered and pt agreeable to plan of care and goals as stated below:     Goals:     Short term goals:  6 weeks or 10 visits      - Pt will demonstrate increased MedX average isometric strength value by 12% from initial test resulting in improved ability to perform bending, lifting, and carrying activities safely, confidently. Appropriate and Ongoing  - Pt will report a reduction in worst pain score by 1-2 points for improved tolerance for sitting tolerance . Appropriate and Ongoing  - Pt able to perform HEP correctly with minimal cueing or supervision from therapist to encourage independent  management of symptoms. Appropriate and Ongoing     Long term goals: 10 weeks or 20 visits   - Pt will demonstrate increased lumbar ROM by at least 3 degrees from initial ROM value, resulting in improved ability to perform functional forward bending while standing and sitting. Appropriate and Ongoing  - Pt will demonstrate increased MedX average isometric strength value by 20% from initial test resulting in improved ability to perform bending, lifting, and carrying activities safely and confidently. Appropriate and Ongoing  - Pt to demonstrate ability to independently control and reduce their pain through posture positioning and mechanical movements throughout a typical day. Appropriate and Ongoing  - Pt will demonstrate reduced pain and improved functional outcomes as reported on the Oswestry Disability Index by reaching a score of 25 or less in order to demonstrate subjective improvement in pt's condition.   Appropriate and Ongoing  - Pt will demonstrate independence with the HEP at discharge. Appropriate and Ongoing  - Pt will be able to confidently lift a case of water without increase in low back pain (patient goal) Appropriate and Ongoing      Plan   Continue with established Plan of Care towards established PT goals.       Therapist: Renetta Juarez, PT  10/31/2023

## 2023-11-02 ENCOUNTER — CLINICAL SUPPORT (OUTPATIENT)
Dept: REHABILITATION | Facility: HOSPITAL | Age: 63
End: 2023-11-02
Payer: COMMERCIAL

## 2023-11-02 DIAGNOSIS — R68.89 DECREASED FUNCTIONAL ACTIVITY TOLERANCE: ICD-10-CM

## 2023-11-02 DIAGNOSIS — R29.898 DECREASED STRENGTH OF TRUNK AND BACK: Primary | ICD-10-CM

## 2023-11-02 PROCEDURE — 97110 THERAPEUTIC EXERCISES: CPT | Mod: CQ

## 2023-11-02 PROCEDURE — 97112 NEUROMUSCULAR REEDUCATION: CPT | Mod: CQ

## 2023-11-02 NOTE — PROGRESS NOTES
[facility] Back Physical Therapy Treatment      Name: Valentin Teranjj  Clinic Number: [id number]    Therapy Diagnosis:   Encounter Diagnoses   Name Primary?    Decreased strength of trunk and back Yes    Decreased functional activity tolerance      Physician: Kavin Adam MD    Visit Date: 11/2/2023    Physician Orders: PT Eval and Treat  Medical Diagnosis:    Lumbar radiculopathy      Vertebrogenic low back pain      Back pain, unspecified back location, unspecified back pain laterality, unspecified chronicity    Evaluation Date: 10/23/2023  Authorization Period Expiration: 12/31/2023  Reassessment Due: 11/23/2023  Plan of Care Certification Period: 1/23/2024  Visit #/Visits authorized: 4/ 20     Time In: 2:30 PM  Time Out: 3:25 PM  Total Billable Time: 55 minutes  INSURANCE and OUTCOMES: Fee for Service with FOTO Outcomes 1/3    Precautions: standard, [place] on L ear    Pattern of pain determined: 1 Movement responder    Subjective   Valentin reports chronic lower back pain along with tendency for R anterolateral symptoms of pain/numbness with prolonged walking/standing and with sit<->stand transition.    Patient reports tolerating previous visit: Muscular soreness  Patient reports their pain to be 3/10 on a 0-10 scale with 0 being no pain and 10 being the worst pain imaginable.  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, sometimes fishing  Pt goals: To make the back better    Objective     Baseline Isometric Testing on Med X equipment: Testing administered by PT     Date of testing: 10/23/2023  ROM 0-54 deg   Max Peak Torque 202    Min Peak Torque 101    Flex/Ext Ratio 2:1   % below normative data 20     Outcomes:  Initial score: 32% limitation  Visit 5 score:  Visit 10 score:  Goal: 25% limitation     Treatment    Valentin received the treatments listed below:      Valentin received neuromuscular education  to isolate and  engage spinal stabilization musculature correctly for motor control and coordination to aid in function and posture for 10 minutes on the Medical Medx Machine.  Patient performed MedX dynamic exercise with emphasis on spinal muscular control using pacer throughout  active range of motion. Therapist assisted patient in achieving optimal exertion for neural reeducation and endurance training by using the  Ehsan Exertion Rating scale, by instructing the patient to aim for mid range of exertion, performing 15-20 repetitions, slowly, correctly,and safely.           11/2/2023     2:45 PM   [facility] - [facility]   Visit Number 4   VAS Pain Rating 3   Treadmill Time (in min.) 5 min   Lumbar Stretches - Slouch 10   Extension in Lying 10   Extension in Standing 10   Flexion in Lying 10   Lumbar Weight 90 lbs   Repetitions 20   Rating of Perceived Exertion 3       therapeutic exercises to develop strength, endurance, ROM, and core stabilization for 45 minutes including:    LTR x10  DKTC c/ SB 5 sec hold x10  PPT 3 sec hold x10 (cues)  Bridges + GTB x15  SL clamshells + GTB x10  EIL x10  + SOC x 10 (Cues)  EIS x10      Peripheral muscle strengthening which included 1 set of 15-20 repetitions at a slow, controlled 10-13 second per rep pace focused on strengthening supporting musculature for improved body mechanics and functional mobility.  Pt and therapist focused on proper form during treatment to ensure optimal strengthening of each targeted muscle group.  Machines were utilized including torso rotation, leg press, hip abd and hip add, leg ext.  Leg curl, triceps, biceps, chest and row added visit 3    therapeutic activities to improve functional performance for 00  minutes, including:    manual therapy techniques:  were applied to the:  for 00 minutes, including:       cold pack for 5 minutes to low back in Z-lie.    Home Exercises Provided and Patient Education Provided   Home exercises include: DKTC, bridges, EIL, EIS,  SOC  Cardio program: visit 5  Lifting education date: visit 11  Posture/Lumbar roll:   Fridge Magnet Discharge handout (date given):  Equipment at home/gym membership:       Education provided:   - cues with exercises  - MedX performance  - Precor ex performance  - HB Protocol  - Cues w/ex's      Written Home Exercises Provided: Patient instructed to cont prior HEP. Added SOC 11/2/23  Exercises were reviewed and Valentin was able to demonstrate them prior to the end of the session.  Valentin demonstrated good  understanding of the education provided.     See EMR under Patient Instructions for exercises provided prior visit.    Assessment   Valentin returns with chronic c/o Back and R anterolateral thigh discomfort which is rated as minimal currently. Treatment continued with  mobility, strengthening and neuromuscular reeducation exercises. He was progressed to GTB for bridging with band and clamshells. Also added SOC for mobility. He was able to perform ex's with min cues and without increased pain. Lumbar MedX resistance was maintained at 90 ft/lbs with pt completing 20 reps with RPE =3/10. Pt was also able to complete the full peripheral strengthening exercises without c/o increased pain. Will continue to progress per HB protocol and pt's tolerance.     Patient is making  progress towards established goals.  Pt will continue to benefit from skilled outpatient physical therapy to address the deficits stated in the impairment chart, provide pt/family education and to maximize pt's level of independence in the home and community environment.     Anticipated Barriers for therapy: none  Pt's spiritual, cultural and educational needs considered and pt agreeable to plan of care and goals as stated below:     Goals:     Short term goals:  6 weeks or 10 visits      - Pt will demonstrate increased MedX average isometric strength value by 12% from initial test resulting in improved ability to perform bending, lifting, and carrying  activities safely, confidently. Appropriate and Ongoing  - Pt will report a reduction in worst pain score by 1-2 points for improved tolerance for sitting tolerance . Appropriate and Ongoing  - Pt able to perform HEP correctly with minimal cueing or supervision from therapist to encourage independent management of symptoms. Appropriate and Ongoing     Long term goals: 10 weeks or 20 visits   - Pt will demonstrate increased lumbar ROM by at least 3 degrees from initial ROM value, resulting in improved ability to perform functional forward bending while standing and sitting. Appropriate and Ongoing  - Pt will demonstrate increased MedX average isometric strength value by 20% from initial test resulting in improved ability to perform bending, lifting, and carrying activities safely and confidently. Appropriate and Ongoing  - Pt to demonstrate ability to independently control and reduce their pain through posture positioning and mechanical movements throughout a typical day. Appropriate and Ongoing  - Pt will demonstrate reduced pain and improved functional outcomes as reported on the Oswestry Disability Index by reaching a score of 25 or less in order to demonstrate subjective improvement in pt's condition.   Appropriate and Ongoing  - Pt will demonstrate independence with the HEP at discharge. Appropriate and Ongoing  - Pt will be able to confidently lift a case of water without increase in low back pain (patient goal) Appropriate and Ongoing    Plan   Continue with established Plan of Care towards established PT goals.     Therapist: Stanford Avelar, PTA  11/2/2023

## 2023-11-06 ENCOUNTER — CLINICAL SUPPORT (OUTPATIENT)
Dept: REHABILITATION | Facility: HOSPITAL | Age: 63
End: 2023-11-06
Payer: COMMERCIAL

## 2023-11-06 DIAGNOSIS — R29.898 DECREASED STRENGTH OF TRUNK AND BACK: Primary | ICD-10-CM

## 2023-11-06 DIAGNOSIS — R68.89 DECREASED FUNCTIONAL ACTIVITY TOLERANCE: ICD-10-CM

## 2023-11-06 PROCEDURE — 97110 THERAPEUTIC EXERCISES: CPT | Mod: CQ

## 2023-11-06 PROCEDURE — 97112 NEUROMUSCULAR REEDUCATION: CPT | Mod: CQ

## 2023-11-06 NOTE — PROGRESS NOTES
[facility]      Name: Valentin Teranjj  Clinic Number: [id number]    Therapy Diagnosis:   Encounter Diagnoses   Name Primary?    Decreased strength of trunk and back Yes    Decreased functional activity tolerance      Physician: Kavin Adam MD    Visit Date: 11/6/2023    Physician Orders: PT Eval and Treat  Medical Diagnosis:    Lumbar radiculopathy      Vertebrogenic low back pain      Back pain, unspecified back location, unspecified back pain laterality, unspecified chronicity    Evaluation Date: 10/23/2023  Authorization Period Expiration: 12/31/2023  Reassessment Due: 11/23/2023  Plan of Care Certification Period: 1/23/2024  Visit #/Visits authorized: 5/ 20     Time In: 2:25 PM  Time Out:  3:25 PM  Total Billable Time: 55 minutes  INSURANCE and OUTCOMES: Fee for Service with FOTO Outcomes 1/3    Precautions: standard, [place] on L ear    Pattern of pain determined: 1 Movement responder    Subjective   Valentin reports chronic lower back pain which is moderate currently. Still with tendency for increased pain with sit->stand transition.    Patient reports tolerating previous visit: Muscular soreness  Patient reports their pain to be 5/10 on a 0-10 scale with 0 being no pain and 10 being the worst pain imaginable.  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, sometimes fishing  Pt goals: To make the back better    Objective     Baseline Isometric Testing on Med X equipment: Testing administered by PT     Date of testing: 10/23/2023  ROM 0-54 deg   Max Peak Torque 202    Min Peak Torque 101    Flex/Ext Ratio 2:1   % below normative data 20     Outcomes:  Initial score: 32% limitation  Visit 5 score:  Visit 10 score:  Goal: 25% limitation     Treatment    Valentin received the treatments listed below:      Valentin received neuromuscular education  to isolate and engage spinal stabilization  musculature correctly for motor control and coordination to aid in function and posture for 10 minutes on the Medical Medx Machine.  Patient performed MedX dynamic exercise with emphasis on spinal muscular control using pacer throughout  active range of motion. Therapist assisted patient in achieving optimal exertion for neural reeducation and endurance training by using the  Ehsan Exertion Rating scale, by instructing the patient to aim for mid range of exertion, performing 15-20 repetitions, slowly, correctly,and safely.           11/6/2023     2:52 PM   [facility] - [facility]   Visit Number 5   VAS Pain Rating 5   Treadmill Time (in min.) 5 min   Lumbar Stretches - Slouch 10   Extension in Lying 10   Extension in Standing 10   Flexion in Lying 10   Lumbar Weight 95 lbs   Repetitions 15   Rating of Perceived Exertion 3        therapeutic exercises to develop strength, endurance, ROM, and core stabilization for 40 minutes including:    LTR x10  DKTC c/ SB 5 sec hold x10  PPT 3 sec hold x10 (cues)  Bridges + GTB x 20  SL clamshells + GTB x10  EIL x10  SOC x 10 (Cues)  EIS x10      Peripheral muscle strengthening which included 1 set of 15-20 repetitions at a slow, controlled 10-13 second per rep pace focused on strengthening supporting musculature for improved body mechanics and functional mobility.  Pt and therapist focused on proper form during treatment to ensure optimal strengthening of each targeted muscle group.  Machines were utilized including torso rotation, leg press, hip abd and hip add, leg ext.  Leg curl, triceps, biceps, chest and row added visit 3    manual therapy techniques:  were applied to the:  for  5 minutes, including:  Long axis distraction (B)LE, Short axis distraction (R)LE       cold pack for 5 minutes to low back in Z-lie.    Home Exercises Provided and Patient Education Provided   Home exercises include: DKTC, bridges, EIL, EIS, SOC  Cardio program: visit 5 11/6/23  Lifting education  date: visit 11  Posture/Lumbar roll:  obtained   [employer] Discharge handout (date given):  Equipment at home/gym membership:       Education provided:   - cues with exercises  - MedX performance  - Precor ex performance    Written Home Exercises Provided: Patient instructed to cont prior HEP. Added SOC 11/2/23  Exercises were reviewed and Valentin was able to demonstrate them prior to the end of the session.  Valentin demonstrated good  understanding of the education provided.     See EMR under Patient Instructions for exercises provided prior visit.    Assessment   Valentin returns with chronic c/o Back and R anterolateral thigh discomfort which is rated as moderate currently. Treatment continued with  mobility, strengthening and neuromuscular reeducation exercises. He was progressed with increased reps for bridging with band. Also added manual techniques to include Long axis distraction B LE and Short axis distraction (R)LE which provides some relief.. He was able to perform ex's with min cues and without increased pain. He was educated on the benefits of cardiovascular ex stating that he doesn't do much currently but will look to try more walking. Lumbar MedX resistance was Increased to 95 ft/lbs with pt completing 15 reps with RPE = 3/10. Pt was also able to complete the full peripheral strengthening exercises without c/o increased pain. Will continue to progress per HB protocol and pt's tolerance. Pain level diminished to 3/10 post session.    Patient is making  progress towards established goals.  Pt will continue to benefit from skilled outpatient physical therapy to address the deficits stated in the impairment chart, provide pt/family education and to maximize pt's level of independence in the home and community environment.     Anticipated Barriers for therapy: none  Pt's spiritual, cultural and educational needs considered and pt agreeable to plan of care and goals as stated below:     Goals:     Short term  goals:  6 weeks or 10 visits      - Pt will demonstrate increased MedX average isometric strength value by 12% from initial test resulting in improved ability to perform bending, lifting, and carrying activities safely, confidently. Appropriate and Ongoing  - Pt will report a reduction in worst pain score by 1-2 points for improved tolerance for sitting tolerance . Appropriate and Ongoing  - Pt able to perform HEP correctly with minimal cueing or supervision from therapist to encourage independent management of symptoms. Appropriate and Ongoing     Long term goals: 10 weeks or 20 visits   - Pt will demonstrate increased lumbar ROM by at least 3 degrees from initial ROM value, resulting in improved ability to perform functional forward bending while standing and sitting. Appropriate and Ongoing  - Pt will demonstrate increased MedX average isometric strength value by 20% from initial test resulting in improved ability to perform bending, lifting, and carrying activities safely and confidently. Appropriate and Ongoing  - Pt to demonstrate ability to independently control and reduce their pain through posture positioning and mechanical movements throughout a typical day. Appropriate and Ongoing  - Pt will demonstrate reduced pain and improved functional outcomes as reported on the Oswestry Disability Index by reaching a score of 25 or less in order to demonstrate subjective improvement in pt's condition.   Appropriate and Ongoing  - Pt will demonstrate independence with the HEP at discharge. Appropriate and Ongoing  - Pt will be able to confidently lift a case of water without increase in low back pain (patient goal) Appropriate and Ongoing    Plan   Continue with established Plan of Care towards established PT goals.     Therapist: Stanford Avelar, PTA  11/6/2023

## 2023-11-21 ENCOUNTER — CLINICAL SUPPORT (OUTPATIENT)
Dept: REHABILITATION | Facility: HOSPITAL | Age: 63
End: 2023-11-21
Payer: COMMERCIAL

## 2023-11-21 DIAGNOSIS — R68.89 DECREASED FUNCTIONAL ACTIVITY TOLERANCE: ICD-10-CM

## 2023-11-21 DIAGNOSIS — R29.898 DECREASED STRENGTH OF TRUNK AND BACK: Primary | ICD-10-CM

## 2023-11-21 PROCEDURE — 97112 NEUROMUSCULAR REEDUCATION: CPT

## 2023-11-21 PROCEDURE — 97110 THERAPEUTIC EXERCISES: CPT

## 2023-11-21 NOTE — PROGRESS NOTES
[facility] Physical Therapy Treatment      Name: Valentin Jensen Hajj  Clinic Number: [id number]    Therapy Diagnosis:   Encounter Diagnoses   Name Primary?    Decreased strength of trunk and back Yes    Decreased functional activity tolerance      Physician: Kavin Adam MD    Visit Date: 11/21/2023    Physician Orders: PT Eval and Treat  Medical Diagnosis:    Lumbar radiculopathy      Vertebrogenic low back pain      Back pain, unspecified back location, unspecified back pain laterality, unspecified chronicity    Evaluation Date: 10/23/2023  Authorization Period Expiration: 12/31/2023  Reassessment Due: 12/21/2023  Plan of Care Certification Period: 1/23/2024  Visit #/Visits authorized: 6/ 20     Time In: 2:00 PM  Time Out:  3:00 PM  Total Billable Time: 55 minutes  INSURANCE and OUTCOMES: Fee for Service with FOTO Outcomes 1/3    Precautions: standard, [place] on L ear    Pattern of pain determined: 1 Movement responder    Subjective   Valentin reports he had to cancel sessions last week due to having the flu. He reports low back pain continues to come and go ; he felt increased pain for a couple days after last visit. Pain mostly on R side of low back     Patient reports tolerating previous visit: Muscular soreness  Patient reports their pain to be 5/10 on a 0-10 scale with 0 being no pain and 10 being the worst pain imaginable.  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 R minimal loss, pain R low back   Rotation Right PT observes contralateral shoulder within functional limits within functional limits   Rotation Left PT observes contralateral shoulder within functional limits within functional limits       Baseline Isometric Testing on Med X equipment: Testing administered by PT     Date of testing: 10/23/2023  ROM 0-54 deg   Max Peak Torque 202    Min Peak Torque 101    Flex/Ext Ratio 2:1   % below normative data 20     Outcomes:  Initial score: 32% limitation  Visit 5 score:  Visit 10 score:  Goal: 25% limitation     Treatment    Valentin received the treatments listed below:      Valentin received neuromuscular education  to isolate and engage spinal stabilization musculature correctly for motor control and coordination to aid in function and posture for 10 minutes on the Medical Medx Machine.  Patient performed MedX dynamic exercise with emphasis on spinal muscular control using pacer throughout  active range of motion. Therapist assisted patient in achieving optimal exertion for neural reeducation and endurance training by using the  Ehsan Exertion Rating scale, by instructing the patient to aim for mid range of exertion, performing 15-20 repetitions, slowly, correctly,and safely.           11/21/2023     4:01 PM   [facility]   Visit Number 6   VAS Pain Rating 5   Treadmill Time (in min.) 5 min   Lumbar Stretches - Slouch Overcorrection 10   Extension in Standing 10   Flexion in Lying 10   Lumbar Weight 90 lbs   Repetitions 20   Rating of Perceived Exertion 3   Ice - Z Lie (in min.) 5        therapeutic exercises to develop strength, endurance, ROM, and core stabilization for 45 minutes including:    LTR x10  DKTC c/ SB 5 sec hold x10  PPT 3 sec hold x10 (cues)  Bridges + GTB x 20  SL clamshells + GTB x10  EIL x10  SOC x 10 (Cues)  EIS x10      Peripheral muscle strengthening which included 1 set of 15-20 repetitions at a slow, controlled 10-13 second per  rep pace focused on strengthening supporting musculature for improved body mechanics and functional mobility.  Pt and therapist focused on proper form during treatment to ensure optimal strengthening of each targeted muscle group.  Machines were utilized including torso rotation, leg press, hip abd and hip add, leg ext.  Leg curl, triceps, biceps, chest and row added visit 3    manual therapy techniques:  were applied to the:  for 00 minutes, including:  Long axis distraction (B)LE, Short axis distraction (R)LE - NP       cold pack for 5 minutes to low back in Z-lie.    Home Exercises Provided and Patient Education Provided   Home exercises include: DKTC, bridges, EIL, EIS, SOC  Cardio program: visit 5 11/6/23  Lifting education date: visit 11  Posture/Lumbar roll:  obtained   [employer] Discharge handout (date given):  Equipment at home/gym membership:       Education provided:   - cues with exercises  - MedX performance  - Precor ex performance    Written Home Exercises Provided: Patient instructed to cont prior HEP. Added SOC 11/2/23  Exercises were reviewed and Valentin was able to demonstrate them prior to the end of the session.  Valentin demonstrated good  understanding of the education provided.     See EMR under Patient Instructions for exercises provided prior visit.    Assessment   Valentin returns after 2 week lapse in visits with continued c/o chronic back pain. He reports increased pain following last visit. Treatment continued with  mobility, strengthening and neuromuscular reeducation exercises. He was able to perform mat ex's with min cues and without increased pain. Due to lapse in visits, Lumbar MedX resistance was decreased to 90 ft/lbs with pt completing 20 reps with RPE = 3/10. He was able to complete the full peripheral strengthening exercises without c/o increased pain. Pt reported feeling better with decreased pain post session. Will continue to progress per HB protocol and pt's  tolerance.    Patient is making  progress towards established goals.  Pt will continue to benefit from skilled outpatient physical therapy to address the deficits stated in the impairment chart, provide pt/family education and to maximize pt's level of independence in the home and community environment.     Anticipated Barriers for therapy: none  Pt's spiritual, cultural and educational needs considered and pt agreeable to plan of care and goals as stated below:     Goals:     Short term goals:  6 weeks or 10 visits      - Pt will demonstrate increased MedX average isometric strength value by 12% from initial test resulting in improved ability to perform bending, lifting, and carrying activities safely, confidently. Appropriate and Ongoing  - Pt will report a reduction in worst pain score by 1-2 points for improved tolerance for sitting tolerance . Appropriate and Ongoing  - Pt able to perform HEP correctly with minimal cueing or supervision from therapist to encourage independent management of symptoms. Appropriate and Ongoing     Long term goals: 10 weeks or 20 visits   - Pt will demonstrate increased lumbar ROM by at least 3 degrees from initial ROM value, resulting in improved ability to perform functional forward bending while standing and sitting. Appropriate and Ongoing  - Pt will demonstrate increased MedX average isometric strength value by 20% from initial test resulting in improved ability to perform bending, lifting, and carrying activities safely and confidently. Appropriate and Ongoing  - Pt to demonstrate ability to independently control and reduce their pain through posture positioning and mechanical movements throughout a typical day. Appropriate and Ongoing  - Pt will demonstrate reduced pain and improved functional outcomes as reported on the Oswestry Disability Index by reaching a score of 25 or less in order to demonstrate subjective improvement in pt's condition.   Appropriate and Ongoing  - Pt  will demonstrate independence with the HEP at discharge. Appropriate and Ongoing  - Pt will be able to confidently lift a case of water without increase in low back pain (patient goal) Appropriate and Ongoing    Plan   Continue with established Plan of Care towards established PT goals.     Therapist: Renetta Juarez, PT  11/22/2023

## 2023-11-28 ENCOUNTER — CLINICAL SUPPORT (OUTPATIENT)
Dept: REHABILITATION | Facility: HOSPITAL | Age: 63
End: 2023-11-28
Payer: COMMERCIAL

## 2023-11-28 DIAGNOSIS — R29.898 DECREASED STRENGTH OF TRUNK AND BACK: Primary | ICD-10-CM

## 2023-11-28 DIAGNOSIS — R68.89 DECREASED FUNCTIONAL ACTIVITY TOLERANCE: ICD-10-CM

## 2023-11-28 PROCEDURE — 97112 NEUROMUSCULAR REEDUCATION: CPT

## 2023-11-28 PROCEDURE — 97110 THERAPEUTIC EXERCISES: CPT

## 2023-11-28 NOTE — PROGRESS NOTES
[facility]      Name: Valentin Jensen Hajj  Clinic Number: [id number]    Therapy Diagnosis:   Encounter Diagnoses   Name Primary?    Decreased strength of trunk and back Yes    Decreased functional activity tolerance      Physician: Kavin Adam MD    Visit Date: 11/28/2023    Physician Orders: PT Eval and Treat  Medical Diagnosis:    Lumbar radiculopathy      Vertebrogenic low back pain      Back pain, unspecified back location, unspecified back pain laterality, unspecified chronicity    Evaluation Date: 10/23/2023  Authorization Period Expiration: 12/31/2023  Reassessment Due: 12/21/2023  Plan of Care Certification Period: 1/23/2024  Visit #/Visits authorized: 7/20     Time In: 2:00 PM  Time Out: 3:05 PM  Total Billable Time: 60 minutes  INSURANCE and OUTCOMES: Fee for Service with FOTO Outcomes 1/3    Precautions: standard, Hughes on L ear    Pattern of pain determined: 1 Movement responder    Subjective   Valentin reports he is feeling better today, but still having some pain in Right low back.     Patient reports tolerating previous visit: Muscular soreness  Patient reports their pain to be 3-4/10 on a 0-10 scale with 0 being no pain and 10 being the worst pain imaginable.  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 R minimal loss, pain R low back   Rotation Right PT observes contralateral shoulder within functional limits within  functional limits   Rotation Left PT observes contralateral shoulder within functional limits within functional limits     Baseline Isometric Testing on Med X equipment: Testing administered by PT     Date of testing: 10/23/2023  ROM 0-54 deg   Max Peak Torque 202    Min Peak Torque 101    Flex/Ext Ratio 2:1   % below normative data 20     Outcomes:  Initial score: 32% limitation  Visit 5 score:  Visit 10 score:  Goal: 25% limitation     Treatment    Valentin received the treatments listed below:      Valentin received neuromuscular education  to isolate and engage spinal stabilization musculature correctly for motor control and coordination to aid in function and posture for 10 minutes on the Medical Medx Machine.  Patient performed MedX dynamic exercise with emphasis on spinal muscular control using pacer throughout  active range of motion. Therapist assisted patient in achieving optimal exertion for neural reeducation and endurance training by using the  Ehsan Exertion Rating scale, by instructing the patient to aim for mid range of exertion, performing 15-20 repetitions, slowly, correctly,and safely.           11/28/2023     3:12 PM   [facility]   Visit Number 7   VAS Pain Rating 3   Treadmill Time (in min.) 5 min   Lumbar Stretches - Slouch Overcorrection 10   Extension in Lying 10   Extension in Standing 10   Flexion in Lying 10   Lumbar Weight 95 lbs   Repetitions 20   Rating of Perceived Exertion 3   Ice - Z Lie (in min.) 5        therapeutic exercises to develop strength, endurance, ROM, and core stabilization for 45 minutes including:    LTR x10  DKTC c/ SB 5 sec hold x10  +Sidelying open book x10 each side  +Quadraped cat/cow x10  PPT 3 sec hold x10 (cues)  Bridges + GTB x 20  EIL x10  SOC x 10  EIS x10    Not performed:  SL clamshells + GTB x10    Peripheral muscle strengthening which included 1 set of 15-20 repetitions at a slow, controlled 10-13 second per rep pace focused on strengthening supporting  musculature for improved body mechanics and functional mobility.  Pt and therapist focused on proper form during treatment to ensure optimal strengthening of each targeted muscle group.  Machines were utilized including torso rotation, leg press, hip abd and hip add, leg ext.  Leg curl, triceps, biceps, chest and row added visit 3    manual therapy techniques: were applied for 5 minutes, including:    Lumbar Right UPAs L2-L5 in prone, grade III-IV oscillations    cold pack for 5 minutes to low back in Z-lie.    Home Exercises Provided and Patient Education Provided   Home exercises include: DKTC, bridges, EIL, EIS, SOC  Cardio program: visit 5 11/6/23  Lifting education date: visit 11  Posture/Lumbar roll:  obtained   [employer] Discharge handout (date given):  Equipment at home/gym membership:       Education provided:   - cues with exercises  - MedX performance  - Precor ex performance    Written Home Exercises Provided: Patient instructed to cont prior HEP. Added SOC 11/2/23  Exercises were reviewed and Valentin was able to demonstrate them prior to the end of the session.  Valentin demonstrated good  understanding of the education provided.     See EMR under Patient Instructions for exercises provided prior visit.    Assessment   Valentin returns with Right low back pain rated 3-4/10 today. Treatment continued with lumbopelvic mobility, strengthening, and neuromuscular reeducation exercises. Performed lumbar joint mobs to address pain and pt responded well. Added open book and quadraped cat/cow stretch. Pt c/o increased pain with bridges today which calmed after completion of exercise. Lumbar MedX resistance was increased to 95 ft/lbs with pt completing 20 reps with RPE = 3/10. Increase resistance by 5% next visit. Pt was able to complete the full peripheral strengthening exercises without c/o increased discomfort. He reported feeling better with decreased pain from 3-4/10 to 0/10 post treatment. Will continue to  progress per HB protocol and pt's tolerance.    Patient is making  progress towards established goals.  Pt will continue to benefit from skilled outpatient physical therapy to address the deficits stated in the impairment chart, provide pt/family education and to maximize pt's level of independence in the home and community environment.     Anticipated Barriers for therapy: none  Pt's spiritual, cultural and educational needs considered and pt agreeable to plan of care and goals as stated below:     Goals:     Short term goals:  6 weeks or 10 visits      - Pt will demonstrate increased MedX average isometric strength value by 12% from initial test resulting in improved ability to perform bending, lifting, and carrying activities safely, confidently. Appropriate and Ongoing  - Pt will report a reduction in worst pain score by 1-2 points for improved tolerance for sitting tolerance . Appropriate and Ongoing  - Pt able to perform HEP correctly with minimal cueing or supervision from therapist to encourage independent management of symptoms. Appropriate and Ongoing     Long term goals: 10 weeks or 20 visits   - Pt will demonstrate increased lumbar ROM by at least 3 degrees from initial ROM value, resulting in improved ability to perform functional forward bending while standing and sitting. Appropriate and Ongoing  - Pt will demonstrate increased MedX average isometric strength value by 20% from initial test resulting in improved ability to perform bending, lifting, and carrying activities safely and confidently. Appropriate and Ongoing  - Pt to demonstrate ability to independently control and reduce their pain through posture positioning and mechanical movements throughout a typical day. Appropriate and Ongoing  - Pt will demonstrate reduced pain and improved functional outcomes as reported on the Oswestry Disability Index by reaching a score of 25 or less in order to demonstrate subjective improvement in pt's  condition.   Appropriate and Ongoing  - Pt will demonstrate independence with the HEP at discharge. Appropriate and Ongoing  - Pt will be able to confidently lift a case of water without increase in low back pain (patient goal) Appropriate and Ongoing    Plan   Continue with established Plan of Care towards established PT goals.     Therapist: Renetta Juarez, PT  11/28/2023

## 2023-11-30 ENCOUNTER — CLINICAL SUPPORT (OUTPATIENT)
Dept: REHABILITATION | Facility: HOSPITAL | Age: 63
End: 2023-11-30
Payer: COMMERCIAL

## 2023-11-30 ENCOUNTER — DOCUMENTATION ONLY (OUTPATIENT)
Dept: REHABILITATION | Facility: HOSPITAL | Age: 63
End: 2023-11-30
Payer: COMMERCIAL

## 2023-11-30 DIAGNOSIS — R68.89 DECREASED FUNCTIONAL ACTIVITY TOLERANCE: ICD-10-CM

## 2023-11-30 DIAGNOSIS — R29.898 DECREASED STRENGTH OF TRUNK AND BACK: Primary | ICD-10-CM

## 2023-11-30 PROCEDURE — 97112 NEUROMUSCULAR REEDUCATION: CPT | Mod: CQ

## 2023-11-30 PROCEDURE — 97110 THERAPEUTIC EXERCISES: CPT | Mod: CQ

## 2023-11-30 NOTE — PROGRESS NOTES
PT/PTA met face to face to discuss pt's treatment plan and progress towards established goals. Pt will be seen by a physical therapist minimally every 6th visit or every 30 days.      Stanford Avelar PTA

## 2023-11-30 NOTE — PROGRESS NOTES
"[facility]      Name: Valentin Jensen jj  Clinic Number: [id number]    Therapy Diagnosis:   Encounter Diagnoses   Name Primary?    Decreased strength of trunk and back Yes    Decreased functional activity tolerance      Physician: Kavin Adam MD    Visit Date: 11/30/2023    Physician Orders: PT Eval and Treat  Medical Diagnosis:    Lumbar radiculopathy      Vertebrogenic low back pain      Back pain, unspecified back location, unspecified back pain laterality, unspecified chronicity    Evaluation Date: 10/23/2023  Authorization Period Expiration: 12/31/2023  Reassessment Due: 12/21/2023  Plan of Care Certification Period: 1/23/2024  Visit #/Visits authorized: 7/20     Time In: 2:00 PM  Time Out: 3:05 PM  Total Billable Time: 60 minutes  INSURANCE and OUTCOMES: Fee for Service with FOTO Outcomes 1/3    Precautions: standard, [place] on L ear    Pattern of pain determined: 1 Movement responder    Subjective   Valentin reports that his back pain/R thigh pain is "still there" but slightly better.     Patient reports tolerating previous visit: Muscular soreness  Patient reports their pain to be 2-3/10 on a 0-10 scale with 0 being no pain and 10 being the worst pain imaginable.  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 R minimal loss, pain R low back   Rotation Right PT observes contralateral shoulder within functional limits within " functional limits   Rotation Left PT observes contralateral shoulder within functional limits within functional limits     Baseline Isometric Testing on Med X equipment: Testing administered by PT     Date of testing: 10/23/2023  ROM 0-54 deg   Max Peak Torque 202    Min Peak Torque 101    Flex/Ext Ratio 2:1   % below normative data 20     Outcomes:  Initial score: 32% limitation  Visit 5 score:  Visit 10 score:  Goal: 25% limitation     Treatment    Valentin received the treatments listed below:      Valentin received neuromuscular education  to isolate and engage spinal stabilization musculature correctly for motor control and coordination to aid in function and posture for 10 minutes on the Medical Medx Machine.  Patient performed MedX dynamic exercise with emphasis on spinal muscular control using pacer throughout  active range of motion. Therapist assisted patient in achieving optimal exertion for neural reeducation and endurance training by using the  Ehsan Exertion Rating scale, by instructing the patient to aim for mid range of exertion, performing 15-20 repetitions, slowly, correctly,and safely.           11/30/2023     3:27 PM   [facility]   Visit Number 8   VAS Pain Rating 3   Treadmill Time (in min.) 5 min   Extension in Lying 10   Extension in Standing 10   Flexion in Lying 10   Lumbar Weight 100 lbs   Repetitions 20   Rating of Perceived Exertion 3         therapeutic exercises to develop strength, endurance, ROM, and core stabilization for 45 minutes including:    LTR x10  DKTC c/ SB 5 sec hold x10  open book x10 each side  PPT 3 sec hold x10 (cues)  +TrA + SLR x 10  Bridges + BTB  x 20  EIL x10, x 10 with therapist overpressure  Quadruped cat/cow x10 (cues)  SOC x 10--NP  EIS w/towel x10    Not performed:  SL clamshells + GTB x10    Peripheral muscle strengthening which included 1 set of 15-20 repetitions at a slow, controlled 10-13 second per rep pace focused on strengthening supporting  musculature for improved body mechanics and functional mobility.  Pt and therapist focused on proper form during treatment to ensure optimal strengthening of each targeted muscle group.  Machines were utilized including torso rotation, leg press, hip abd and hip add, leg ext.  Leg curl, triceps, biceps, chest and row added visit 3    manual therapy techniques: were applied for 5 minutes, including:  Long axis distraction (B)LE, Short axis distraction (R)LE     Lumbar Right UPAs L2-L5 in prone, grade III-IV oscillations--NP    cold pack for 5 minutes to low back in Z-lie.    Home Exercises Provided and Patient Education Provided   Home exercises include: DKTC, bridges, EIL, EIS, SOC  Cardio program: visit 5 11/6/23  Lifting education date: visit 11  Posture/Lumbar roll:  obtained   [employer] Magn[employer] Discharge handout (date given):  Equipment at home/gym membership:       Education provided:   - cues with exercises  - MedX performance  - Precor ex performance    Written Home Exercises Provided: Patient instructed to cont prior HEP. Added SOC 11/2/23  Exercises were reviewed and Valentin was able to demonstrate them prior to the end of the session.  Valentin demonstrated good  understanding of the education provided.     See EMR under Patient Instructions for exercises provided prior visit.    Assessment   Valentin returns with chronic lower back and R thigh discomfort which is rated 2-3/10 today. Treatment continued with lumbopelvic mobility, strengthening, and neuromuscular reeducation exercises. Continued with manual techniques and also added therapist overpressure with EIL which felt good per subjective report.  Lumbar MedX resistance was increased to 100 ft/lbs with pt completing 20 reps with RPE = 3/10. Pt was able to complete the full peripheral strengthening exercises without c/o increased discomfort. Will continue to progress per HB protocol and pt's tolerance.    Patient is making  progress towards established goals.  Pt  will continue to benefit from skilled outpatient physical therapy to address the deficits stated in the impairment chart, provide pt/family education and to maximize pt's level of independence in the home and community environment.     Anticipated Barriers for therapy: none  Pt's spiritual, cultural and educational needs considered and pt agreeable to plan of care and goals as stated below:     Goals:   Short term goals:  6 weeks or 10 visits      - Pt will demonstrate increased MedX average isometric strength value by 12% from initial test resulting in improved ability to perform bending, lifting, and carrying activities safely, confidently. Appropriate and Ongoing  - Pt will report a reduction in worst pain score by 1-2 points for improved tolerance for sitting tolerance . Appropriate and Ongoing  - Pt able to perform HEP correctly with minimal cueing or supervision from therapist to encourage independent management of symptoms. Appropriate and Ongoing     Long term goals: 10 weeks or 20 visits   - Pt will demonstrate increased lumbar ROM by at least 3 degrees from initial ROM value, resulting in improved ability to perform functional forward bending while standing and sitting. Appropriate and Ongoing  - Pt will demonstrate increased MedX average isometric strength value by 20% from initial test resulting in improved ability to perform bending, lifting, and carrying activities safely and confidently. Appropriate and Ongoing  - Pt to demonstrate ability to independently control and reduce their pain through posture positioning and mechanical movements throughout a typical day. Appropriate and Ongoing  - Pt will demonstrate reduced pain and improved functional outcomes as reported on the Oswestry Disability Index by reaching a score of 25 or less in order to demonstrate subjective improvement in pt's condition.   Appropriate and Ongoing  - Pt will demonstrate independence with the HEP at discharge. Appropriate and  Ongoing  - Pt will be able to confidently lift a case of water without increase in low back pain (patient goal) Appropriate and Ongoing    Plan   Continue with established Plan of Care towards established PT goals.     Therapist: Stanford Avelar PTA  11/30/2023

## 2023-12-05 ENCOUNTER — CLINICAL SUPPORT (OUTPATIENT)
Dept: REHABILITATION | Facility: HOSPITAL | Age: 63
End: 2023-12-05
Payer: COMMERCIAL

## 2023-12-05 DIAGNOSIS — R29.898 DECREASED STRENGTH OF TRUNK AND BACK: Primary | ICD-10-CM

## 2023-12-05 DIAGNOSIS — R68.89 DECREASED FUNCTIONAL ACTIVITY TOLERANCE: ICD-10-CM

## 2023-12-05 PROCEDURE — 97110 THERAPEUTIC EXERCISES: CPT

## 2023-12-05 PROCEDURE — 97112 NEUROMUSCULAR REEDUCATION: CPT

## 2023-12-05 NOTE — PROGRESS NOTES
[facility]      Name: Valentin Jensen Hajj  Clinic Number: [id number]    Therapy Diagnosis:   Encounter Diagnoses   Name Primary?    Decreased strength of trunk and back Yes    Decreased functional activity tolerance      Physician: Kavin Adam MD    Visit Date: 12/5/2023    Physician Orders: PT Eval and Treat  Medical Diagnosis:    Lumbar radiculopathy      Vertebrogenic low back pain      Back pain, unspecified back location, unspecified back pain laterality, unspecified chronicity    Evaluation Date: 10/23/2023  Authorization Period Expiration: 12/31/2023  Reassessment Due: 12/21/2023  Plan of Care Certification Period: 1/23/2024  Visit #/Visits authorized: 9/20     Time In: 2:05 PM  Time Out: 3:00 PM  Total Billable Time: 55 minutes  INSURANCE and OUTCOMES: Fee for Service with FOTO Outcomes 1/3    Precautions: standard, [place] on L ear    Pattern of pain determined: 1 Movement responder    Subjective   Valentin reports his back is feeling good today with no c/o pain upon arrival to session.     Patient reports tolerating previous visit: Muscular soreness  Patient reports their pain to be 0/10 on a 0-10 scale with 0 being no pain and 10 being the worst pain imaginable.  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 R minimal loss, pain R low back   Rotation Right PT observes contralateral shoulder within functional limits within  functional limits   Rotation Left PT observes contralateral shoulder within functional limits within functional limits     Baseline Isometric Testing on Med X equipment: Testing administered by PT     Date of testing: 10/23/2023  ROM 0-54 deg   Max Peak Torque 202    Min Peak Torque 101    Flex/Ext Ratio 2:1   % below normative data 20     Outcomes:  Initial score: 32% limitation  Visit 5 score:  Visit 10 score:  Goal: 25% limitation     Treatment    Valentin received the treatments listed below:      Valentin received neuromuscular education  to isolate and engage spinal stabilization musculature correctly for motor control and coordination to aid in function and posture for 10 minutes on the Medical Medx Machine.  Patient performed MedX dynamic exercise with emphasis on spinal muscular control using pacer throughout  active range of motion. Therapist assisted patient in achieving optimal exertion for neural reeducation and endurance training by using the  Ehsan Exertion Rating scale, by instructing the patient to aim for mid range of exertion, performing 15-20 repetitions, slowly, correctly,and safely.           12/5/2023     3:03 PM   [facility]   Visit Number 9   VAS Pain Rating 0   Treadmill Time (in min.) 5 min   Extension in Lying 20   Extension in Standing 10   Flexion in Lying 10   Lumbar Weight 105 lbs   Repetitions 20   Rating of Perceived Exertion 3         therapeutic exercises to develop strength, endurance, ROM, and core stabilization for 45 minutes including:    LTR x10  DKTC c/ SB 5 sec hold x10  open book x10 each side  PPT 3 sec hold x10 (cues)  TrA + SLR x 15  Bridges + BTB  x 20  EIL x10, x 10 with therapist overpressure  Quadruped cat/cow x10 (cues)  EIS w/towel x10  +Paloff press at cable column 10# x 10 (Next visit 15#)    Not performed:  SL clamshells + GTB x10  SOC x 10    Peripheral muscle strengthening which included 1 set of 15-20 repetitions at a slow, controlled 10-13 second per rep  pace focused on strengthening supporting musculature for improved body mechanics and functional mobility.  Pt and therapist focused on proper form during treatment to ensure optimal strengthening of each targeted muscle group.  Machines were utilized including torso rotation, leg press, hip abd and hip add, leg ext.  Leg curl, triceps, biceps, chest and row added visit 3    manual therapy techniques: were applied for 00 minutes, including:  Long axis distraction (B)LE, Short axis distraction (R)LE     Lumbar Right UPAs L2-L5 in prone, grade III-IV oscillations--NP    cold pack for 5 minutes to low back in Z-lie.    Home Exercises Provided and Patient Education Provided   Home exercises include: DKTC, bridges, EIL, EIS, SOC  Cardio program: visit 5 11/6/23  Lifting education date: visit 11  Posture/Lumbar roll:  obtained   [facility] hand[facility] (date given):  Equipment at home/gym membership:       Education provided:   - cues with exercises  - MedX performance  - Precor ex performance    Written Home Exercises Provided: Patient instructed to cont prior HEP. Added SOC 11/2/23  Exercises were reviewed and Valentin was able to demonstrate them prior to the end of the session.  Valentin demonstrated good  understanding of the education provided.     See EMR under Patient Instructions for exercises provided prior visit.    Assessment   Valentin returns with no c/o back pain today. Treatment continued with lumbopelvic mobility, strengthening, and neuromuscular reeducation exercises. Added paloff press at cable column for further core strengthening which pt tolerated well. He was able to complete all ex's without c/o pain. Lumbar MedX resistance was increased to 105 ft/lbs with pt completing 20 reps with RPE = 3/10. Pt was able to complete full peripheral strengthening exercises without c/o increased discomfort. He deferred cold pack at end of session. Will continue to progress per HB protocol and pt's  tolerance.    Patient is making  progress towards established goals.  Pt will continue to benefit from skilled outpatient physical therapy to address the deficits stated in the impairment chart, provide pt/family education and to maximize pt's level of independence in the home and community environment.     Anticipated Barriers for therapy: none  Pt's spiritual, cultural and educational needs considered and pt agreeable to plan of care and goals as stated below:     Goals:   Short term goals:  6 weeks or 10 visits      - Pt will demonstrate increased MedX average isometric strength value by 12% from initial test resulting in improved ability to perform bending, lifting, and carrying activities safely, confidently. Appropriate and Ongoing  - Pt will report a reduction in worst pain score by 1-2 points for improved tolerance for sitting tolerance . Appropriate and Ongoing  - Pt able to perform HEP correctly with minimal cueing or supervision from therapist to encourage independent management of symptoms. Appropriate and Ongoing     Long term goals: 10 weeks or 20 visits   - Pt will demonstrate increased lumbar ROM by at least 3 degrees from initial ROM value, resulting in improved ability to perform functional forward bending while standing and sitting. Appropriate and Ongoing  - Pt will demonstrate increased MedX average isometric strength value by 20% from initial test resulting in improved ability to perform bending, lifting, and carrying activities safely and confidently. Appropriate and Ongoing  - Pt to demonstrate ability to independently control and reduce their pain through posture positioning and mechanical movements throughout a typical day. Appropriate and Ongoing  - Pt will demonstrate reduced pain and improved functional outcomes as reported on the Oswestry Disability Index by reaching a score of 25 or less in order to demonstrate subjective improvement in pt's condition.   Appropriate and Ongoing  - Pt  will demonstrate independence with the HEP at discharge. Appropriate and Ongoing  - Pt will be able to confidently lift a case of water without increase in low back pain (patient goal) Appropriate and Ongoing    Plan   Continue with established Plan of Care towards established PT goals.     Therapist: Renetta Juarez, PT  12/5/2023

## 2023-12-12 ENCOUNTER — CLINICAL SUPPORT (OUTPATIENT)
Dept: REHABILITATION | Facility: HOSPITAL | Age: 63
End: 2023-12-12
Payer: COMMERCIAL

## 2023-12-12 DIAGNOSIS — R68.89 DECREASED FUNCTIONAL ACTIVITY TOLERANCE: ICD-10-CM

## 2023-12-12 DIAGNOSIS — R29.898 DECREASED STRENGTH OF TRUNK AND BACK: Primary | ICD-10-CM

## 2023-12-12 PROCEDURE — 97112 NEUROMUSCULAR REEDUCATION: CPT

## 2023-12-12 PROCEDURE — 97110 THERAPEUTIC EXERCISES: CPT

## 2023-12-12 NOTE — PROGRESS NOTES
[facility]      Name: Valentin Jensen Hajj  Clinic Number: [id number]    Therapy Diagnosis:   Encounter Diagnoses   Name Primary?    Decreased strength of trunk and back Yes    Decreased functional activity tolerance      Physician: Kavin Adam MD    Visit Date: 12/12/2023    Physician Orders: PT Eval and Treat  Medical Diagnosis:    Lumbar radiculopathy      Vertebrogenic low back pain      Back pain, unspecified back location, unspecified back pain laterality, unspecified chronicity    Evaluation Date: 10/23/2023  Authorization Period Expiration: 12/31/2023  Reassessment Due: 12/21/2023  Plan of Care Certification Period: 1/23/2024  Visit #/Visits authorized: 10/20     Time In: 2:35 PM  Time Out: 3:35 PM  Total Billable Time: 55 minutes  INSURANCE and OUTCOMES: Fee for Service with FOTO Outcomes 2/3    Precautions: standard, [place] on L ear    Pattern of pain determined: 1 Movement responder    Subjective   Valentin reports on Thursday he felt increased back pain and he's not sure why. He didn't do anything unusual. He reports he continues to have good days and bad days. He feels good after attending PT sessions, but still has moments of increased pain without incident. He does feel that the program has been helpful    Patient reports tolerating previous visit: Muscular soreness  Patient reports their pain to be 2/10 on a 0-10 scale with 0 being no pain and 10 being the worst pain imaginable.  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 R minimal loss, pain R low back   Rotation Right PT observes contralateral shoulder within functional limits within functional limits   Rotation Left PT observes contralateral shoulder within functional limits within functional limits     Baseline Isometric Testing on Med X equipment: Testing administered by PT  Date of testing: 10/23/2023  ROM 0-54 deg   Max Peak Torque 202    Min Peak Torque 101    Flex/Ext Ratio 2:1   % below normative data 20     Midpoint Isometric Testing on Med X equipment: Testing administered by PT  Date of testin2023  ROM 0-54 deg   Max Peak Torque 219   Min Peak Torque 113   Flex/Ext Ratio 1.9:1   % below normative data 14     Outcomes:  Initial score: 51% limitation  Visit 10 score: 55% limitation   Discharge:  Goal: 35% limitation     Treatment    Valentin received the treatments listed below:      Valentin received neuromuscular education  to isolate and engage spinal stabilization musculature correctly for motor control and coordination to aid in function and posture for 15 minutes on the Medical Medx Machine.  Patient performed MedX dynamic exercise with emphasis on spinal muscular control using pacer throughout  active range of motion. Therapist assisted patient in achieving optimal exertion for neural reeducation and endurance training by using the  Ehsan Exertion Rating scale, by instructing the patient to aim for mid range of exertion, performing 15-20 repetitions, slowly, correctly,and safely.           2023     2:39 PM   [facility]   Visit Number 10   VAS Pain Rating 2   Treadmill Time (in min.) 5 min   Extension in Lying 20   Extension in Standing 10   Flexion in Lying 10   Lumbar Flexion 54   Lumbar Extension 0   Lumbar Peak Torque 219 ft. lbs.   Min Torque 113   Test Percent Below Normative Data 14 %   Test Percent Gain in Strength from Initial  9 %    Ice - Z Lie (in min.) 5         therapeutic exercises to develop strength, endurance, ROM, and core stabilization for 45 minutes including:    LTR x10  DKTC c/ SB 5 sec hold x10  open book x10 each side  TrA + SLR x 15  Bridges + BTB  x 20  EIL x10, x 10 with therapist overpressure  Quadruped cat/cow x10 (cues)  EIS w/towel x10  Paloff press at cable column 15# x 10    Not performed:  PPT 3 sec hold x10 (cues)  SL clamshells + GTB x10  SOC x 10    Peripheral muscle strengthening which included 1 set of 15-20 repetitions at a slow, controlled 10-13 second per rep pace focused on strengthening supporting musculature for improved body mechanics and functional mobility.  Pt and therapist focused on proper form during treatment to ensure optimal strengthening of each targeted muscle group.  Machines were utilized including torso rotation, leg press, hip abd and hip add, leg ext.  Leg curl, triceps, biceps, chest and row added visit 3    manual therapy techniques: were applied for 00 minutes, including:  Long axis distraction (B)LE, Short axis distraction (R)LE     Lumbar Right UPAs L2-L5 in prone, grade III-IV oscillations--NP    cold pack for 5 minutes to low back in Z-lie.    Home Exercises Provided and Patient Education Provided   Home exercises include: DKTC, bridges, EIL, EIS, SOC  Cardio program: visit 5 11/6/23  Lifting education date: visit 11  Posture/Lumbar roll:  obtained   [employer]idge Magnet Discharge handout (date given):  Equipment at home/gym membership:       Education provided:   - cues with exercises  - MedX performance  - Precor ex performance    Written Home Exercises Provided: Patient instructed to cont prior HEP. Added SOC 11/2/23  Exercises were reviewed and Valentin was able to demonstrate them prior to the end of the session.  Valentin demonstrated good  understanding of the education provided.     See EMR under Patient Instructions for exercises provided prior visit.    Assessment   Patient has attended  10 visits at [facility] which included MD evaluation, PT evaluation with isometric testing, and physical therapy treatment including HEP instruction, education, aerobic activity, dynamic strengthening on MedX equipment for the spine, and whole body strengthening on MedX equipment with increasing resistance. Patient demonstrates improved posture and improved strength as stated below:    -Improved strength at each test point on lumbar MedX isometric test with 9% average improvement noted with reduced pain noted by patient.      Patient is making  progress towards established goals.  Pt will continue to benefit from skilled outpatient physical therapy to address the deficits stated in the impairment chart, provide pt/family education and to maximize pt's level of independence in the home and community environment.     Anticipated Barriers for therapy: none  Pt's spiritual, cultural and educational needs considered and pt agreeable to plan of care and goals as stated below:     Goals:   Short term goals:  6 weeks or 10 visits      - Pt will demonstrate increased MedX average isometric strength value by 12% from initial test resulting in improved ability to perform bending, lifting, and carrying activities safely, confidently. Appropriate and Ongoing  - Pt will report a reduction in worst pain score by 1-2 points for improved tolerance for sitting tolerance . Appropriate and Ongoing  - Pt able to perform HEP correctly with minimal cueing or supervision from therapist to encourage independent management of symptoms. Appropriate and Ongoing     Long term goals: 10 weeks or 20 visits   - Pt will demonstrate increased lumbar ROM by at least 3 degrees from initial ROM value, resulting in improved ability to perform functional forward bending while standing and sitting. Appropriate and Ongoing  - Pt will demonstrate increased MedX average isometric strength value by 20% from initial test resulting in improved ability  to perform bending, lifting, and carrying activities safely and confidently. Appropriate and Ongoing  - Pt to demonstrate ability to independently control and reduce their pain through posture positioning and mechanical movements throughout a typical day. Appropriate and Ongoing  - Pt will demonstrate reduced pain and improved functional outcomes as reported on the Oswestry Disability Index by reaching a score of 35 or less in order to demonstrate subjective improvement in pt's condition.   Appropriate and Ongoing  - Pt will demonstrate independence with the HEP at discharge. Appropriate and Ongoing  - Pt will be able to confidently lift a case of water without increase in low back pain (patient goal) Appropriate and Ongoing    Plan   Continue with established Plan of Care towards established PT goals.       Therapist: Renetta Juarez, PT  12/14/2023

## 2023-12-14 ENCOUNTER — CLINICAL SUPPORT (OUTPATIENT)
Dept: REHABILITATION | Facility: HOSPITAL | Age: 63
End: 2023-12-14
Payer: COMMERCIAL

## 2023-12-14 DIAGNOSIS — R29.898 DECREASED STRENGTH OF TRUNK AND BACK: Primary | ICD-10-CM

## 2023-12-14 DIAGNOSIS — R68.89 DECREASED FUNCTIONAL ACTIVITY TOLERANCE: ICD-10-CM

## 2023-12-14 PROCEDURE — 97112 NEUROMUSCULAR REEDUCATION: CPT | Mod: CQ

## 2023-12-14 PROCEDURE — 97110 THERAPEUTIC EXERCISES: CPT | Mod: CQ

## 2024-01-16 ENCOUNTER — CLINICAL SUPPORT (OUTPATIENT)
Dept: REHABILITATION | Facility: HOSPITAL | Age: 64
End: 2024-01-16
Payer: COMMERCIAL

## 2024-01-16 DIAGNOSIS — R68.89 DECREASED FUNCTIONAL ACTIVITY TOLERANCE: ICD-10-CM

## 2024-01-16 DIAGNOSIS — R29.898 DECREASED STRENGTH OF TRUNK AND BACK: Primary | ICD-10-CM

## 2024-01-16 PROCEDURE — 97110 THERAPEUTIC EXERCISES: CPT

## 2024-01-16 PROCEDURE — 97112 NEUROMUSCULAR REEDUCATION: CPT

## 2024-01-16 NOTE — PROGRESS NOTES
[facility] Treatment      Name: Valentin Jensen Hajj  Clinic Number: [id number]    Therapy Diagnosis:   Encounter Diagnoses   Name Primary?    Decreased strength of trunk and back Yes    Decreased functional activity tolerance      Physician: Kavin Adam MD    Visit Date: 1/16/2024    Physician Orders: PT Eval and Treat  Medical Diagnosis:    Lumbar radiculopathy      Vertebrogenic low back pain      Back pain, unspecified back location, unspecified back pain laterality, unspecified chronicity    Evaluation Date: 10/23/2023  Authorization Period Expiration: 12/31/2023  Reassessment Due: 2/16/2024  Plan of Care Certification Period: 2/29/2024  Visit #/Visits authorized: 12/20     Time In: 2:00 PM  Time Out: 2:55 PM  Total Billable Time: 55 minutes  INSURANCE and OUTCOMES: Fee for Service with FOTO Outcomes 2/3    Precautions: standard, [place] on L ear    Pattern of pain determined: 1 Movement responder    Subjective   Valentin reports that he was sick for a few weeks; initially he tested positive for Covid then he also had the flu. States he is feeling better and has fully recovered from symptoms. He reports back pain has been minimal overall, except for a few days ago he used Voltaren gel to help calm some pain. He reports no back pain upon arrival to session today.     Patient reports tolerating previous visit: Muscular soreness  Patient reports their pain to be 0/10 on a 0-10 scale with 0 being no pain and 10 being the worst pain imaginable.  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 R minimal loss, pain R low back within functional limits   Rotation Right PT observes contralateral shoulder within functional limits within functional limits within functional limits   Rotation Left PT observes contralateral shoulder within functional limits within functional limits within functional limits     Baseline Isometric Testing on Med X equipment: Testing administered by PT  Date of testing: 10/23/2023  ROM 0-54 deg   Max Peak Torque 202    Min Peak Torque 101    Flex/Ext Ratio 2:1   % below normative data 20     Midpoint Isometric Testing on Med X equipment: Testing administered by PT  Date of testin2023  ROM 0-54 deg   Max Peak Torque 219   Min Peak Torque 113   Flex/Ext Ratio 1.9:1   % below normative data 14     Outcomes:  Initial score: 51% limitation  Visit 10 score: 55% limitation   Discharge:  Goal: 35% limitation     Treatment    Valentin received the treatments listed below:      Valentin received neuromuscular education  to isolate and engage spinal stabilization musculature correctly for motor control and coordination to aid in function and posture for 10 minutes on the Medical Medx Machine.  Patient performed MedX dynamic exercise with emphasis on spinal muscular control using pacer throughout  active range of motion. Therapist assisted patient in achieving optimal exertion for neural reeducation and endurance training by using the  Ehsan Exertion Rating scale, by instructing the patient to aim for mid range of exertion, performing 15-20 repetitions, slowly, correctly,and safely.            2024     3:10 PM   [facility]   Visit Number 12   VAS Pain Rating 0   Treadmill Time (in min.) 5 min   Extension in Lying 10   Extension in Standing 10   Flexion in Lying  10   Lumbar Weight 110 lbs   Repetitions 15   Rating of Perceived Exertion 3     therapeutic exercises to develop strength, endurance, ROM, and core stabilization for 45 minutes including:    LTR x10  DKTC c/ SB 5 sec hold x10  open book x10 each side  TrA + SLR x 15  Bridges + Black band x 20  EIL x10  Quadruped cat/cow x10 (cues)  EIS w/towel x10  Paloff press at cable column 15# x 15 (progress next visit)    Not performed:  PPT 3 sec hold x10 (cues)  SL clamshells + GTB x10  SOC x 10    Peripheral muscle strengthening which included 1 set of 15-20 repetitions at a slow, controlled 10-13 second per rep pace focused on strengthening supporting musculature for improved body mechanics and functional mobility.  Pt and therapist focused on proper form during treatment to ensure optimal strengthening of each targeted muscle group.  Machines were utilized including torso rotation, leg press, hip abd and hip add, leg ext.  Leg curl, triceps, biceps, chest and row added visit 3    manual therapy techniques: were applied for 00 minutes, including:  Long axis distraction (B)LE, Short axis distraction (R)LE     Lumbar Right UPAs L2-L5 in prone, grade III-IV oscillations--NP    cold pack for 5 minutes to low back in Z-lie.    Home Exercises Provided and Patient Education Provided   Home exercises include: DKTC, bridges, EIL, EIS, SOC  Cardio program: visit 5 11/6/23  Lifting education date: visit 11  Posture/Lumbar roll:  obtained   [employer] Discharge handout (date given):  Equipment at home/gym membership:       Education provided:   - cues with exercises  - MedX performance  - Precor ex performance    Written Home Exercises Provided: Patient instructed to cont prior HEP. Added SOC 11/2/23  Exercises were reviewed and Valentin was able to demonstrate them prior to the end of the session.  Valentin demonstrated good  understanding of the education provided.     See EMR under Patient Instructions for exercises provided prior  visit.    Assessment   Valentin returns after 4 week lapse with no c/o back pain currently. Treatment continued with lumbopelvic mobility, strengthening, and neuromuscular reeducation exercises. He was able to perform all ex's without c/o symptoms. Lumbar MedX resistance was maintained at 110 ft/lbs with pt completing 15 reps with RPE = 3/10. Pt was able to complete full peripheral strengthening exercises without c/o increased discomfort.  He deferred cold pack at end of session. Will continue to progress per HB protocol and pt's tolerance.     Patient is making  progress towards established goals.  Pt will continue to benefit from skilled outpatient physical therapy to address the deficits stated in the impairment chart, provide pt/family education and to maximize pt's level of independence in the home and community environment.     Anticipated Barriers for therapy: none  Pt's spiritual, cultural and educational needs considered and pt agreeable to plan of care and goals as stated below:     Goals:   Short term goals:  6 weeks or 10 visits   - Pt will demonstrate increased MedX average isometric strength value by 12% from initial test resulting in improved ability to perform bending, lifting, and carrying activities safely, confidently. Appropriate and Ongoing  - Pt will report a reduction in worst pain score by 1-2 points for improved tolerance for sitting tolerance . Appropriate and Ongoing  - Pt able to perform HEP correctly with minimal cueing or supervision from therapist to encourage independent management of symptoms. Appropriate and Ongoing     Long term goals: 10 weeks or 20 visits   - Pt will demonstrate increased lumbar ROM by at least 3 degrees from initial ROM value, resulting in improved ability to perform functional forward bending while standing and sitting. Appropriate and Ongoing  - Pt will demonstrate increased MedX average isometric strength value by 20% from initial test resulting in improved  ability to perform bending, lifting, and carrying activities safely and confidently. Appropriate and Ongoing  - Pt to demonstrate ability to independently control and reduce their pain through posture positioning and mechanical movements throughout a typical day. Appropriate and Ongoing  - Pt will demonstrate reduced pain and improved functional outcomes as reported on the Oswestry Disability Index by reaching a score of 35 or less in order to demonstrate subjective improvement in pt's condition.   Appropriate and Ongoing  - Pt will demonstrate independence with the HEP at discharge. Appropriate and Ongoing  - Pt will be able to confidently lift a case of water without increase in low back pain (patient goal) Appropriate and Ongoing    Plan   Continue with established Plan of Care towards established PT goals.     Therapist: Renetta Juarez, PT  1/16/2024

## 2024-01-16 NOTE — PLAN OF CARE
Outpatient Physical Therapy Updated Plan of Care     Visit Date: 1/16/2024    Name: Valentin Teranjj  Clinic Number: [id number]    Therapy Diagnosis:   Encounter Diagnoses   Name Primary?    Decreased strength of trunk and back Yes    Decreased functional activity tolerance      Physician: Kavin Adam MD    Physician Orders: PT Eval and Treat  Medical Diagnosis:    Lumbar radiculopathy      Vertebrogenic low back pain      Back pain, unspecified back location, unspecified back pain laterality, unspecified chronicity     Evaluation Date: 10/23/2023  Current Certification Period: 10/23/23 to 1/23/24  Authorization Period Expiration: 12/31/2024  Plan of Care Expiration: 1/23/2024  Visit # / Visits authorized: 12/20    Precautions: standard, [place] on L ear   Functional Level Prior to Evaluation:  difficulty with prolonged standing and heavy lifting    Subjective     Update: Valentin reports that he was sick for a few weeks; initially he tested positive for Covid then he also had the flu. States he is feeling better and has fully recovered from symptoms. He reports back pain has been minimal overall, except for a few days ago he used Voltaren gel to help calm some pain. He reports no back pain upon arrival to session today.      Objective     Update:     MOVEMENT LOSS - Lumbar    Norms ROM Loss Initial ROM Loss 11/21/23 ROM 1/16/24   Flexion Fingers touch toes, sacral angle >/= 70 deg, uniform spinal curvature, posterior weight shift  minimal loss minimal loss within functional limits   Extension ASIS surpasses toes, spine of scapulae surpasses heels, uniform spinal curve moderate loss moderate loss Minimal loss   Side glide Right   minimal loss minimal loss within functional limits   Side glide Left   moderate loss P! R minimal loss, pain R low back within functional limits   Rotation Right PT observes contralateral shoulder within functional limits within functional limits within functional limits   Rotation Left PT  observes contralateral shoulder within functional limits within functional limits within functional limits     Baseline Isometric Testing on Med X equipment: Testing administered by PT  Date of testing: 10/23/2023  ROM 0-54 deg   Max Peak Torque 202    Min Peak Torque 101    Flex/Ext Ratio 2:1   % below normative data 20      Midpoint Isometric Testing on Med X equipment: Testing administered by PT  Date of testin2023  ROM 0-54 deg   Max Peak Torque 219   Min Peak Torque 113   Flex/Ext Ratio 1.9:1   % below normative data 14      Outcomes:  Initial score: 51% limitation  Visit 10 score: 55% limitation   Discharge:  Goal: 35% limitation     Assessment     Update: Patient has attended 12 of 20 visits at [facility] with treatment including HEP instruction, education, aerobic activity, dynamic strengthening on MedX equipment for the spine, and whole body strengthening on MedX equipment with increasing resistance. Pt demonstrates improvement in lumbar AROM and back strength. Per last Lumbar MedX retest he demonstrated 9% improvement in average isometric strength, but remains 14% below normative data. Pt is still limited with prolonged activity due to back symptoms. He will continue to benefit from skilled physical therapy to address remaining impairments and improve his quality of life.       Goals:    Short term goals:  6 weeks or 10 visits   Pt will demonstrate increased MedX average isometric strength value by 12% from initial test resulting in improved ability to perform bending, lifting, and carrying activities safely, confidently. Appropriate and Ongoing  - Pt will report a reduction in worst pain score by 1-2 points for improved tolerance for sitting tolerance . Appropriate and Ongoing  - Pt able to perform HEP correctly with minimal cueing or supervision from therapist to encourage independent management of symptoms. Appropriate and Ongoing     Long term goals: 10 weeks or 20 visits   - Pt will  demonstrate increased lumbar ROM by at least 3 degrees from initial ROM value, resulting in improved ability to perform functional forward bending while standing and sitting. Appropriate and Ongoing  - Pt will demonstrate increased MedX average isometric strength value by 20% from initial test resulting in improved ability to perform bending, lifting, and carrying activities safely and confidently. Appropriate and Ongoing  - Pt to demonstrate ability to independently control and reduce their pain through posture positioning and mechanical movements throughout a typical day. Appropriate and Ongoing  - Pt will demonstrate reduced pain and improved functional outcomes as reported on the Oswestry Disability Index by reaching a score of 35 or less in order to demonstrate subjective improvement in pt's condition.   Appropriate and Ongoing  - Pt will demonstrate independence with the HEP at discharge. Appropriate and Ongoing  - Pt will be able to confidently lift a case of water without increase in low back pain (patient goal) Appropriate and Ongoing    Plan   Updated Certification Period: 1/16/2024 to 2/29/2024  Recommended Treatment Plan: 1-2 times per week for 4-6 weeks to include the following:    - Patient education  - Therapeutic exercise  - Manual therapy  - Performance testing   - Neuromuscular Re-education  - Therapeutic activity   - Modalities    Pt may be seen by PTA as part of the rehabilitation team.     Therapist: Renetta Juarez, PT  1/16/2024      I CERTIFY THE NEED FOR THESE SERVICES FURNISHED UNDER THIS PLAN OF TREATMENT AND WHILE UNDER MY CARE    Physician's comments:      Physician's Signature: ___________________________________________________

## 2024-01-18 ENCOUNTER — DOCUMENTATION ONLY (OUTPATIENT)
Dept: REHABILITATION | Facility: HOSPITAL | Age: 64
End: 2024-01-18
Payer: COMMERCIAL

## 2024-01-18 ENCOUNTER — CLINICAL SUPPORT (OUTPATIENT)
Dept: REHABILITATION | Facility: HOSPITAL | Age: 64
End: 2024-01-18
Payer: COMMERCIAL

## 2024-01-18 DIAGNOSIS — R68.89 DECREASED FUNCTIONAL ACTIVITY TOLERANCE: ICD-10-CM

## 2024-01-18 DIAGNOSIS — R29.898 DECREASED STRENGTH OF TRUNK AND BACK: Primary | ICD-10-CM

## 2024-01-18 PROCEDURE — 97112 NEUROMUSCULAR REEDUCATION: CPT | Mod: CQ

## 2024-01-18 PROCEDURE — 97110 THERAPEUTIC EXERCISES: CPT | Mod: CQ

## 2024-01-18 NOTE — PROGRESS NOTES
PT/PTA met face to face on 1/16/24 to discuss pt's treatment plan and progress towards established goals. Pt will be seen by a physical therapist minimally every 6th visit or every 30 days.    Stanford Avelar PTA

## 2024-01-18 NOTE — PROGRESS NOTES
[facility]      Name: Valentin Jensen Hajj  Clinic Number: [id number]    Therapy Diagnosis:   Encounter Diagnoses   Name Primary?    Decreased strength of trunk and back Yes    Decreased functional activity tolerance      Physician: Kavin Adam MD    Visit Date: 1/18/2024    Physician Orders: PT Eval and Treat  Medical Diagnosis:    Lumbar radiculopathy      Vertebrogenic low back pain      Back pain, unspecified back location, unspecified back pain laterality, unspecified chronicity    Evaluation Date: 10/23/2023  Authorization Period Expiration: 12/31/2024   Reassessment Due: 2/16/2024  Plan of Care Certification Period: 2/29/2024  Visit #/Visits authorized: 13/20     Time In: 2:30 PM  Time Out: 3:30 PM  Total Billable Time:  55 minutes  INSURANCE and OUTCOMES: Fee for Service with FOTO Outcomes 2/3    Precautions: standard, [place] on L ear    Pattern of pain determined: 1 Movement responder    Subjective   Valentin reports onset of some increased soreness after last session likely due to increased time between visits due to illness. States that his back pain is mild currently.    Patient reports tolerating previous visit: Muscular soreness  Patient reports their pain to be 2/10 on a 0-10 scale with 0 being no pain and 10 being the worst pain imaginable.  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 R minimal loss, pain R low back within functional limits   Rotation Right PT observes contralateral shoulder within functional limits within functional limits within functional limits   Rotation Left PT observes contralateral shoulder within functional limits within functional limits within functional limits     Baseline Isometric Testing on Med X equipment: Testing administered by PT  Date of testing: 10/23/2023  ROM 0-54 deg   Max Peak Torque 202    Min Peak Torque 101    Flex/Ext Ratio 2:1   % below normative data 20     Midpoint Isometric Testing on Med X equipment: Testing administered by PT  Date of testin2023  ROM 0-54 deg   Max Peak Torque 219   Min Peak Torque 113   Flex/Ext Ratio 1.9:1   % below normative data 14     Outcomes:  Initial score: 51% limitation  Visit 10 score: 55% limitation   Discharge:  Goal: 35% limitation     Treatment    Valentin received the treatments listed below:      Valentin received neuromuscular education  to isolate and engage spinal stabilization musculature correctly for motor control and coordination to aid in function and posture for 10 minutes on the Medical Medx Machine.  Patient performed MedX dynamic exercise with emphasis on spinal muscular control using pacer throughout  active range of motion. Therapist assisted patient in achieving optimal exertion for neural reeducation and endurance training by using the  Ehsan Exertion Rating scale, by instructing the patient to aim for mid range of exertion, performing 15-20 repetitions, slowly, correctly,and safely.           2024     3:04 PM   [facility] - [facility]   Visit Number 13   VAS Pain Rating 2   Treadmill Time (in min.) 5 min   Extension in Lying 10   Extension in Standing 10   Flexion in Lying 10   Lumbar Weight 110 lbs   Repetitions 20   Rating of Perceived Exertion 3      therapeutic exercises to develop strength, endurance, ROM, and core stabilization for 45  minutes including:    LTR x10  DKTC c/ SB 5 sec hold x10  open book x10 each side  TrA + SLR + 1# x 10  Bridges + Black band x 20  EIL x10  Quadruped cat/cow x10 (cues)  +Bird dog x 5 each ( Cues for level pelvis)  EIS w/towel x10  Paloff press at cable column 20# x 15      Not performed:  PPT 3 sec hold x10 (cues)  SL clamshells + GTB x10  SOC x 10    Peripheral muscle strengthening which included 1 set of 15-20 repetitions at a slow, controlled 10-13 second per rep pace focused on strengthening supporting musculature for improved body mechanics and functional mobility.  Pt and therapist focused on proper form during treatment to ensure optimal strengthening of each targeted muscle group.  Machines were utilized including torso rotation, leg press, hip abd and hip add, leg ext.  Leg curl, triceps, biceps, chest and row added visit 3    manual therapy techniques: were applied for 00 minutes, including:  Long axis distraction (B)LE, Short axis distraction (R)LE     Lumbar Right UPAs L2-L5 in prone, grade III-IV oscillations--NP    cold pack for 5 minutes to low back in Z-lie.    Home Exercises Provided and Patient Education Provided   Home exercises include: DKTC, bridges, EIL, EIS, SOC  Cardio program: visit 5 11/6/23  Lifting education date: visit 11  Posture/Lumbar roll:  obtained   [employer] Discharge handout (date given):  Equipment at home/gym membership:       Education provided:   - cues with exercises  - MedX performance  - Precor ex performance    Written Home Exercises Provided: Patient instructed to cont prior HEP.  Exercises were reviewed and Valentin was able to demonstrate them prior to the end of the session.  Valentin demonstrated good  understanding of the education provided.     See EMR under Patient Instructions for exercises provided prior visits.    Assessment   Valentin returns with mild lower back stiffness/discomfort currently. Treatment continued with lumbopelvic mobility, strengthening, and  neuromuscular reeducation exercises. He was progressed to perform 1# resistance for TrA + SLR ex , increased resistance for Paloff press and also added bird dog ex. He was able to perform all ex's without c/o symptoms. Lumbar MedX resistance was maintained at 110 ft/lbs with pt completing 20 reps with RPE = 3/10. Pt was able to complete full peripheral strengthening exercises without c/o increased discomfort and felt better overall post session. Will continue to progress per HB protocol and pt's tolerance.     Patient is making  progress towards established goals.  Pt will continue to benefit from skilled outpatient physical therapy to address the deficits stated in the impairment chart, provide pt/family education and to maximize pt's level of independence in the home and community environment.     Anticipated Barriers for therapy: none  Pt's spiritual, cultural and educational needs considered and pt agreeable to plan of care and goals as stated below:     Goals:   Short term goals:  6 weeks or 10 visits   - Pt will demonstrate increased MedX average isometric strength value by 12% from initial test resulting in improved ability to perform bending, lifting, and carrying activities safely, confidently. Appropriate and Ongoing  - Pt will report a reduction in worst pain score by 1-2 points for improved tolerance for sitting tolerance . Appropriate and Ongoing  - Pt able to perform HEP correctly with minimal cueing or supervision from therapist to encourage independent management of symptoms. Appropriate and Ongoing     Long term goals: 10 weeks or 20 visits   - Pt will demonstrate increased lumbar ROM by at least 3 degrees from initial ROM value, resulting in improved ability to perform functional forward bending while standing and sitting. Appropriate and Ongoing  - Pt will demonstrate increased MedX average isometric strength value by 20% from initial test resulting in improved ability to perform bending, lifting,  and carrying activities safely and confidently. Appropriate and Ongoing  - Pt to demonstrate ability to independently control and reduce their pain through posture positioning and mechanical movements throughout a typical day. Appropriate and Ongoing  - Pt will demonstrate reduced pain and improved functional outcomes as reported on the Oswestry Disability Index by reaching a score of 35 or less in order to demonstrate subjective improvement in pt's condition.   Appropriate and Ongoing  - Pt will demonstrate independence with the HEP at discharge. Appropriate and Ongoing  - Pt will be able to confidently lift a case of water without increase in low back pain (patient goal) Appropriate and Ongoing    Plan   Continue with established Plan of Care towards established PT goals.     Therapist: Stanford Avelar, PTA  1/18/2024

## 2024-01-23 ENCOUNTER — CLINICAL SUPPORT (OUTPATIENT)
Dept: REHABILITATION | Facility: HOSPITAL | Age: 64
End: 2024-01-23
Payer: COMMERCIAL

## 2024-01-23 DIAGNOSIS — R68.89 DECREASED FUNCTIONAL ACTIVITY TOLERANCE: ICD-10-CM

## 2024-01-23 DIAGNOSIS — R29.898 DECREASED STRENGTH OF TRUNK AND BACK: Primary | ICD-10-CM

## 2024-01-23 PROCEDURE — 97112 NEUROMUSCULAR REEDUCATION: CPT | Mod: CQ

## 2024-01-23 PROCEDURE — 97110 THERAPEUTIC EXERCISES: CPT | Mod: CQ

## 2024-01-23 NOTE — PROGRESS NOTES
[facility]      Name: Valentin Teranjj  Clinic Number: [id number]    Therapy Diagnosis:   Encounter Diagnoses   Name Primary?    Decreased strength of trunk and back Yes    Decreased functional activity tolerance      Physician: Kavin Adam MD    Visit Date: 1/23/2024    Physician Orders: PT Eval and Treat  Medical Diagnosis:    Lumbar radiculopathy      Vertebrogenic low back pain      Back pain, unspecified back location, unspecified back pain laterality, unspecified chronicity    Evaluation Date: 10/23/2023  Authorization Period Expiration: 12/31/2024   Reassessment Due: 2/16/2024  Plan of Care Certification Period: 2/29/2024  Visit #/Visits authorized: 14/20     Time In: 1:55 PM  Time Out: 2:55 PM  Total Billable Time:  55 minutes  INSURANCE and OUTCOMES: Fee for Service with FOTO Outcomes 2/3    Precautions: standard, [place] on L ear    Pattern of pain determined: 1 Movement responder    Subjective   Valentin reports that he is feeling  a little better overall. Mild report of lower back stiffness/discomfort presently.  States will be going out of town this week and will perform HEP while out of town    Patient reports tolerating previous visit: Muscular soreness  Patient reports their pain to be 2/10 on a 0-10 scale with 0 being no pain and 10 being the worst pain imaginable.  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 R minimal loss, pain R low back within functional limits   Rotation Right PT observes contralateral shoulder within functional limits within functional limits within functional limits   Rotation Left PT observes contralateral shoulder within functional limits within functional limits within functional limits     Baseline Isometric Testing on Med X equipment: Testing administered by PT  Date of testing: 10/23/2023  ROM 0-54 deg   Max Peak Torque 202    Min Peak Torque 101    Flex/Ext Ratio 2:1   % below normative data 20     Midpoint Isometric Testing on Med X equipment: Testing administered by PT  Date of testin2023  ROM 0-54 deg   Max Peak Torque 219   Min Peak Torque 113   Flex/Ext Ratio 1.9:1   % below normative data 14     Outcomes:  Initial score: 51% limitation  Visit 10 score: 55% limitation   Discharge:  Goal: 35% limitation     Treatment    Valentin received the treatments listed below:      Valentin received neuromuscular education  to isolate and engage spinal stabilization musculature correctly for motor control and coordination to aid in function and posture for 10 minutes on the Medical Medx Machine.  Patient performed MedX dynamic exercise with emphasis on spinal muscular control using pacer throughout  active range of motion. Therapist assisted patient in achieving optimal exertion for neural reeducation and endurance training by using the  Ehsan Exertion Rating scale, by instructing the patient to aim for mid range of exertion, performing 15-20 repetitions, slowly, correctly,and safely.           2024     4:18 PM   [facility] - [facility]   Visit Number 14   VAS Pain Rating 2   Treadmill Time (in min.) 5 min   Extension in Lying 10   Extension in Standing 10   Flexion in Lying 10   Lumbar Weight 115 lbs   Repetitions 15   Rating of Perceived Exertion 3       therapeutic exercises to develop strength, endurance, ROM, and  core stabilization for 45 minutes including:    LTR x10  DKTC c/ SB 5 sec hold x10  open book w/ RTB x10 each side  TrA + SLR + 2# x 10  Bridges + Black band x 20  EIL x10  Quadruped cat/cow x10 (cues)  Bird dog x 10 each ( Cues for level pelvis)  EISw/towel x10  Paloff press at cable column 20# x 15      Not performed:  PPT 3 sec hold x10 (cues)  SL clamshells + GTB x10  SOC x 10    Peripheral muscle strengthening which included 1 set of 15-20 repetitions at a slow, controlled 10-13 second per rep pace focused on strengthening supporting musculature for improved body mechanics and functional mobility.  Pt and therapist focused on proper form during treatment to ensure optimal strengthening of each targeted muscle group.  Machines were utilized including torso rotation, leg press, hip abd and hip add, leg ext.  Leg curl, triceps, biceps, chest and row added visit 3    manual therapy techniques: were applied for 00 minutes, including:  Long axis distraction (B)LE, Short axis distraction (R)LE     Lumbar Right UPAs L2-L5 in prone, grade III-IV oscillations--NP    cold pack for 5 minutes to low back in Z-lie.    Home Exercises Provided and Patient Education Provided   Home exercises include: DKTC, bridges, EIL, EIS, SOC, LTR, open book, cat/cow, bird dog  Cardio program: visit 5 11/6/23  Lifting education date: visit 11  Posture/Lumbar roll:  obtained   [employer] Discharge handout (date given):  Equipment at home/gym membership:       Education provided:   - cues with exercises  - MedX performance  - Precor ex performance    Written Home Exercises Provided: Patient instructed to cont prior HEP. Updated HEP 1/23/24 per patient request to include: LTR, DKTC, open book, EIL, EIS, Bridging with band, Cat/cow and Bird dog ex  Exercises were reviewed and Valentin was able to demonstrate them prior to the end of the session.  Valentin demonstrated good  understanding of the education provided.     See EMR under Patient  Instructions for exercises provided prior visits.    Assessment   Valentin returns with mild lower back stiffness/discomfort currently but improved overall. Treatment continued with mobility, strengthening, and neuromuscular reeducation exercises. He was progressed to perform 2# resistance for TrA + SLR ex , RTB resistance with open book along with increased reps for bird dog (increased cuing initially w/Bird dog ex but this improved w/reps). He was able to perform all ex's without c/o symptoms. Lumbar MedX resistance was increased to 115 ft/lbs with pt completing 15 reps with RPE = 3/10. Pt was able to complete full peripheral strengthening exercises without c/o . He was provided with an updated HEP to perform while out of town.  Will continue to progress per HB protocol and pt's tolerance.     Patient is making  progress towards established goals.  Pt will continue to benefit from skilled outpatient physical therapy to address the deficits stated in the impairment chart, provide pt/family education and to maximize pt's level of independence in the home and community environment.     Anticipated Barriers for therapy: none  Pt's spiritual, cultural and educational needs considered and pt agreeable to plan of care and goals as stated below:     Goals:   Short term goals:  6 weeks or 10 visits   - Pt will demonstrate increased MedX average isometric strength value by 12% from initial test resulting in improved ability to perform bending, lifting, and carrying activities safely, confidently. Appropriate and Ongoing  - Pt will report a reduction in worst pain score by 1-2 points for improved tolerance for sitting tolerance . Appropriate and Ongoing  - Pt able to perform HEP correctly with minimal cueing or supervision from therapist to encourage independent management of symptoms. Appropriate and Ongoing     Long term goals: 10 weeks or 20 visits   - Pt will demonstrate increased lumbar ROM by at least 3 degrees from  initial ROM value, resulting in improved ability to perform functional forward bending while standing and sitting. Appropriate and Ongoing  - Pt will demonstrate increased MedX average isometric strength value by 20% from initial test resulting in improved ability to perform bending, lifting, and carrying activities safely and confidently. Appropriate and Ongoing  - Pt to demonstrate ability to independently control and reduce their pain through posture positioning and mechanical movements throughout a typical day. Appropriate and Ongoing  - Pt will demonstrate reduced pain and improved functional outcomes as reported on the Oswestry Disability Index by reaching a score of 35 or less in order to demonstrate subjective improvement in pt's condition.   Appropriate and Ongoing  - Pt will demonstrate independence with the HEP at discharge. Appropriate and Ongoing  - Pt will be able to confidently lift a case of water without increase in low back pain (patient goal) Appropriate and Ongoing    Plan   Continue with established Plan of Care towards established PT goals.     Therapist: Stanford Avelar, PTA  1/23/2024

## 2024-02-20 ENCOUNTER — CLINICAL SUPPORT (OUTPATIENT)
Dept: REHABILITATION | Facility: HOSPITAL | Age: 64
End: 2024-02-20
Payer: COMMERCIAL

## 2024-02-20 DIAGNOSIS — R29.898 DECREASED STRENGTH OF TRUNK AND BACK: Primary | ICD-10-CM

## 2024-02-20 DIAGNOSIS — R68.89 DECREASED FUNCTIONAL ACTIVITY TOLERANCE: ICD-10-CM

## 2024-02-20 PROCEDURE — 97110 THERAPEUTIC EXERCISES: CPT

## 2024-02-20 PROCEDURE — 97530 THERAPEUTIC ACTIVITIES: CPT

## 2024-02-20 PROCEDURE — 97112 NEUROMUSCULAR REEDUCATION: CPT

## 2024-02-20 NOTE — PROGRESS NOTES
[facility]      Name: Valentin Jensen Hajj  Clinic Number: [id number]    Therapy Diagnosis:   Encounter Diagnoses   Name Primary?    Decreased strength of trunk and back Yes    Decreased functional activity tolerance        Physician: Kavin Adam MD    Visit Date: 2/20/2024    Physician Orders: PT Eval and Treat  Medical Diagnosis:    Lumbar radiculopathy      Vertebrogenic low back pain      Back pain, unspecified back location, unspecified back pain laterality, unspecified chronicity    Evaluation Date: 10/23/2023  Authorization Period Expiration: 12/31/2024   Reassessment Due: 3/20/2024  Plan of Care Certification Period: 2/29/2024  UPDATE TO  04/05/24 6 weeks  Visit #/Visits authorized: 15/20     Time In:  3:00 pm   Time Out: 4:00 pm   Total Billable Time:  60 minutes  INSURANCE and OUTCOMES: Fee for Service with FOTO Outcomes 2/3     Precautions: standard, [place] on L ear    Pattern of pain determined: 1 Movement responder    Subjective   Valentin reports missing nearly a month since last visit due to family travel seeing grandchildren including extended driving.  Patient report no flare ups and noting sig dec pain vs past visits /c driving.  Patient report he did not feel limited in he activities /c his grandchildren.  Patient report ability to going to [place] gathering 5+ cases of water and not feeling any discomfort the day of or later.  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     Patient report mod HEP adherence noting he will do them when he feels some LB discomfort, but not doing them when he has no pain.   Patient report he did not use any postural aids during his drives.       Patient reports tolerating previous visit: well /c no c/o of excess discomfort  Patient reports their pain to be 1/10 on a 0-10 scale with 0 being no pain and 10 being the worst pain imaginable.  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 R minimal loss, pain R low back WFL WFL   Rotation Right PT observes contralateral shoulder within functional limits within functional limits WFL WFL   Rotation Left PT observes contralateral shoulder within functional limits within functional limits WFL WFL     Baseline Isometric Testing on Med X equipment: Testing administered by PT  Date of testing: 10/23/2023  ROM 0-54 deg   Max Peak Torque 202    Min Peak Torque 101    Flex/Ext Ratio 2:1   % below normative data 20     Midpoint Isometric Testing on Med X equipment: Testing administered by PT  Date of testin2023  ROM 0-54 deg   Max Peak Torque 219   Min Peak Torque 113   Flex/Ext Ratio 1.9:1   % below normative data 14     Outcomes:  Initial score: 51% limitation  Visit 10 score: 55% limitation   Discharge:  Goal: 35% limitation     Treatment    Valentin received the treatments listed below:      Valentin received neuromuscular education  to isolate and engage spinal stabilization musculature correctly for motor control and coordination to aid in function and posture for 10 minutes on the Medical Medx Machine.  Patient performed MedX dynamic exercise with emphasis on spinal muscular control using pacer throughout  active range of motion. Therapist assisted patient in achieving optimal exertion for neural reeducation  and endurance training by using the  Ehsan Exertion Rating scale, by instructing the patient to aim for mid range of exertion, performing 15-20 repetitions, slowly, correctly,and safely.               2/20/2024     3:44 PM   [facility]   Visit Number 15   VAS Pain Rating 1   Treadmill Time (in min.) 5 min   Lumbar Stretches - Slouch Overcorrection 10   Extension in Lying 10   Flexion in Lying 10   Lumbar Weight 115 lbs   Repetitions 20   Rating of Perceived Exertion 3   Ice - Z Lie (in min.) 5       Therapeutic exercises to develop strength, endurance, ROM, and core stabilization for 35 minutes including:    TM 5 min     ROM reassessment (see above)     LTR x10  DKTC c/ SB 5 sec hold x10  Open Book x10 each side  EIL x10  Quad    Cat/Cow x 10   SOC x 20      NP:   TrA + SLR + 2# x 10  Bridges + Black band x 20  Bird dog x 10 each ( Cues for level pelvis)  EISw/towel x10  Paloff press at cable column 20# x 15    PPT 3 sec hold x10 (cues)  SL clamshells + GTB x10      Peripheral muscle strengthening which included 1 set of 15-20 repetitions at a slow, controlled 10-13 second per rep pace focused on strengthening supporting musculature for improved body mechanics and functional mobility.  Pt and therapist focused on proper form during treatment to ensure optimal strengthening of each targeted muscle group.  Machines were utilized including torso rotation, leg press, hip abd and hip add, leg ext.  Leg curl, triceps, biceps, chest and row added visit 3      Valentin participated in dynamic functional therapeutic activities to improve functional performance for 15  minutes, including:    Simulated truck/load unload   20# KB high platform to overhead x 10   20# KB low platform low to high platform 10 ft walk between, cue to maintain neutral spine throughout   20# box uneven weight distribution in box 15 ft walk to mid height platform to mid height shelf (truck bed)   Olympic bar lift from 12 in step x 10 each side        manual therapy techniques: were applied for 00 minutes, including:  Long axis distraction (B)LE, Short axis distraction (R)LE   Lumbar Right UPAs L2-L5 in prone, grade III-IV oscillations--NP    Patient received cold pack for 5 minutes to low back in Z-lie.    Home Exercises Provided and Patient Education Provided   Home exercises include:   DKTC, bridges, EIL, EIS, SOC, LTR, open book, cat/cow, bird dog    Cardio program: visit 5 11/6/23  Lifting education date: visit 11  Posture/Lumbar roll:  not obtained as of 02/20/24  [employer] Discharge handout (date given):  Equipment at home/gym membership:       Education provided:   - use of mobility HEP for proactive means    Written Home Exercises Provided: Patient instructed to cont prior HEP. Updated HEP 1/23/24 per patient request to include: LTR, DKTC, open book, EIL, EIS, Bridging with band, Cat/cow and Bird dog ex  Exercises were reviewed and Valentin was able to demonstrate them prior to the end of the session.  Valentin demonstrated good  understanding of the education provided.     See EMR under Patient Instructions for exercises provided prior visits.    Assessment     Patient subjective report indicate sig improved seated tolerance /c driving and flying to family.  Patient able to demonstrate mobility HEP /c accuracy.  Patient encouraged to perform mobility exercises in advance of future situations of extended immobility to proactively address any possible discomfort from immobility.  ROM reassessment indicate no decline in ROM.  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  Patient demonstrate good lifting technique and note no inc LB discomfort /p performance indicating appropriateness of continue progressions and meeting associated goal.  Patient advised to alert PT next visit is delayed onset of LB discomfort occurs from inc intensity today.    Considering extended  time since last visit, lumbar MedX weight maintained to assess exercise tolerance.  Today pt perform 20 reps at 3 RPE indicating no decline in lumbar paraspinal strength.   Patient demonstrate comfort /c inc flexion range upon MedX set up, however, PT declined ROM inc today due to extended time since last visit.  Recommend inc ROM next visit for mobility challenge.        Patient is making  progress towards established goals.  Pt will continue to benefit from skilled outpatient physical therapy to address the deficits stated in the impairment chart, provide pt/family education and to maximize pt's level of independence in the home and community environment.     Anticipated Barriers for therapy: none  Pt's spiritual, cultural and educational needs considered and pt agreeable to plan of care and goals as stated below:     Goals:   Short term goals:  6 weeks or 10 visits   - Pt will demonstrate increased MedX average isometric strength value by 12% from initial test resulting in improved ability to perform bending, lifting, and carrying activities safely, confidently. Appropriate and Ongoing  - Pt will report a reduction in worst pain score by 1-2 points for improved tolerance for sitting tolerance . Appropriate and Ongoing  - Pt able to perform HEP correctly with minimal cueing or supervision from therapist to encourage independent management of symptoms. Appropriate and Ongoing     Long term goals: 10 weeks or 20 visits   - Pt will demonstrate increased lumbar ROM by at least 3 degrees from initial ROM value, resulting in improved ability to perform functional forward bending while standing and sitting. Appropriate and Ongoing  - Pt will demonstrate increased MedX average isometric strength value by 20% from initial test resulting in improved ability to perform bending, lifting, and carrying activities safely and confidently. Appropriate and Ongoing  - Pt to demonstrate ability to independently control and reduce  their pain through posture positioning and mechanical movements throughout a typical day. Appropriate and Ongoing  - Pt will demonstrate reduced pain and improved functional outcomes as reported on the Oswestry Disability Index by reaching a score of 35 or less in order to demonstrate subjective improvement in pt's condition.   Appropriate and Ongoing  - Pt will demonstrate independence with the HEP at discharge. Appropriate and Ongoing  - Pt will be able to confidently lift a case of water without increase in low back pain (patient goal) MET 02/20/24    Plan     POC UPDATE TO  04/05/24 6 weeks    Continue with established Plan of Care towards established PT goals.     Therapist: Goyo Metz, PT  2/20/2024

## 2024-02-20 NOTE — PLAN OF CARE
Outpatient Therapy Updated Plan of Care     Visit Date: 2/20/2024    Name: Valentin Howell  Clinic Number: [id number]    Therapy Diagnosis:   Encounter Diagnoses   Name Primary?    Decreased strength of trunk and back Yes    Decreased functional activity tolerance      Physician: Kavin Adam MD    Physician Orders: PT Eval and Treat  Medical Diagnosis:    Lumbar radiculopathy      Vertebrogenic low back pain      Back pain, unspecified back location, unspecified back pain laterality, unspecified chronicity    Evaluation Date: 10/23/2023  Authorization Period Expiration: 12/31/2024   Reassessment Due: 3/20/2024  Plan of Care Certification Period: 2/29/2024  UPDATE TO  04/05/24 6 weeks   Visit #/Visits authorized: 15/20     Time In:  3:00 pm   Time Out: 4:00 pm   Total Billable Time:  60 minutes  INSURANCE and OUTCOMES: Fee for Service with FOTO Outcomes 2/3     Precautions: standard, [place] on L ear    Pattern of pain determined: 1 Movement responder    Subjective   Valentin reports missing nearly a month since last visit due to family travel seeing grandchildren including extended driving.  Patient report no flare ups and noting sig dec pain vs past visits /c driving.  Patient report he did not feel limited in he activities /c his grandchildren.  Patient report ability to going to [employer] gathering 5+ cases of water and not feeling any discomfort the day of or later.  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     Patient report mod HEP adherence noting he will do them when he feels some LB discomfort, but not doing them when he has no pain.   Patient report he did not use any postural aids during his drives.       Patient reports tolerating previous visit: well /c no c/o of excess discomfort  Patient reports their pain to be 1/10 on a 0-10 scale with 0 being no pain and 10 being the worst pain imaginable.  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 R minimal loss, pain R low back WFL WFL   Rotation Right PT observes contralateral shoulder within functional limits within functional limits WFL WFL   Rotation Left PT observes contralateral shoulder within functional limits within functional limits WFL WFL     Baseline Isometric Testing on Med X equipment: Testing administered by PT  Date of testing: 10/23/2023  ROM 0-54 deg   Max Peak Torque 202    Min Peak Torque 101    Flex/Ext Ratio 2:1   % below normative data 20     Midpoint Isometric Testing on Med X equipment: Testing administered by PT  Date of testin2023  ROM 0-54 deg   Max Peak Torque 219   Min Peak Torque 113   Flex/Ext Ratio 1.9:1   % below normative data 14     Outcomes:  Initial score: 51% limitation  Visit 10 score: 55% limitation   Discharge:  Goal: 35% limitation     Assessment     Update:   Patient subjective report indicate sig improved seated tolerance /c driving and flying to family.  Patient able to demonstrate mobility HEP /c accuracy.  Patient encouraged to perform mobility exercises in advance of future situations of extended immobility to proactively address any possible discomfort from immobility.  ROM reassessment indicate no decline in ROM.  Tx sig progressed today to include varied lifting to address patient stated limitation of  loading construction materials into truck.  Patient demonstrate good lifting technique and note no inc LB discomfort /p performance indicating appropriateness of continue progressions and meeting associated goal.  Patient advised to alert PT next visit is delayed onset of LB discomfort occurs from inc intensity today.    Considering extended time since last visit, lumbar MedX weight maintained to assess exercise tolerance.  Today pt perform 20 reps at 3 RPE indicating no decline in lumbar paraspinal strength.   Patient demonstrate comfort /c inc flexion range upon MedX set up, however, PT declined ROM inc today due to extended time since last visit.  Recommend inc ROM next visit for mobility challenge.        Patient is making  progress towards established goals.  Pt will continue to benefit from skilled outpatient physical therapy to address the deficits stated in the impairment chart, provide pt/family education and to maximize pt's level of independence in the home and community environment.     Anticipated Barriers for therapy: none  Pt's spiritual, cultural and educational needs considered and pt agreeable to plan of care and goals as stated below:     Goals:   Short term goals:  6 weeks or 10 visits   - Pt will demonstrate increased MedX average isometric strength value by 12% from initial test resulting in improved ability to perform bending, lifting, and carrying activities safely, confidently. Appropriate and Ongoing  - Pt will report a reduction in worst pain score by 1-2 points for improved tolerance for sitting tolerance . Appropriate and Ongoing  - Pt able to perform HEP correctly with minimal cueing or supervision from therapist to encourage independent management of symptoms. Appropriate and Ongoing     Long term goals: 10 weeks or 20 visits   - Pt will demonstrate increased lumbar ROM by at least 3 degrees from initial ROM value, resulting in improved ability to perform functional forward bending  "while standing and sitting. Appropriate and Ongoing  - Pt will demonstrate increased MedX average isometric strength value by 20% from initial test resulting in improved ability to perform bending, lifting, and carrying activities safely and confidently. Appropriate and Ongoing  - Pt to demonstrate ability to independently control and reduce their pain through posture positioning and mechanical movements throughout a typical day. Appropriate and Ongoing  - Pt will demonstrate reduced pain and improved functional outcomes as reported on the Oswestry Disability Index by reaching a score of 35 or less in order to demonstrate subjective improvement in pt's condition.   Appropriate and Ongoing  - Pt will demonstrate independence with the HEP at discharge. Appropriate and Ongoing  - Pt will be able to confidently lift a case of water without increase in low back pain (patient goal) MET 02/20/24      Reasons for Recertification of Therapy:   to complete 20 visits per [facility] protocol    Plan     Updated Certification Period: 2/20/2024 to 04/05/24  Recommended Treatment Plan:   Outpatient physical therapy 2x week for 6 weeks or 20 visits to include the following:   - Patient education  - Therapeutic exercise  - Manual therapy  - Performance testing   - Neuromuscular Re-education  - Therapeutic activity   - Modalities    Pt may be seen by PTA as part of the rehabilitation team.     Therapist: Goyo Metz, PT  2/20/2024    "I certify the need for these services furnished under this plan of treatment and while under my care."    ____________________________________  Physician/Referring Practitioner    _______________  Date of Signature    Goyo Metz PT  2/20/2024      I CERTIFY THE NEED FOR THESE SERVICES FURNISHED UNDER THIS PLAN OF TREATMENT AND WHILE UNDER MY CARE    Physician's comments:        Physician's Signature: ___________________________________________________      "

## 2024-02-22 ENCOUNTER — CLINICAL SUPPORT (OUTPATIENT)
Dept: REHABILITATION | Facility: HOSPITAL | Age: 64
End: 2024-02-22
Payer: COMMERCIAL

## 2024-02-22 DIAGNOSIS — R29.898 DECREASED STRENGTH OF TRUNK AND BACK: Primary | ICD-10-CM

## 2024-02-22 DIAGNOSIS — R68.89 DECREASED FUNCTIONAL ACTIVITY TOLERANCE: ICD-10-CM

## 2024-02-22 PROCEDURE — 97110 THERAPEUTIC EXERCISES: CPT

## 2024-02-22 PROCEDURE — 97112 NEUROMUSCULAR REEDUCATION: CPT

## 2024-02-22 NOTE — PROGRESS NOTES
[facility] Back Physical Therapy Treatment      Name: Valentin Jensen Hajj  Clinic Number: [id number]    Therapy Diagnosis:   Encounter Diagnoses   Name Primary?    Decreased strength of trunk and back Yes    Decreased functional activity tolerance          Physician: Kavin Adam MD    Visit Date: 2/22/2024    Physician Orders: PT Eval and Treat  Medical Diagnosis:    Lumbar radiculopathy      Vertebrogenic low back pain      Back pain, unspecified back location, unspecified back pain laterality, unspecified chronicity    Evaluation Date: 10/23/2023  Authorization Period Expiration: 12/31/2024   Reassessment Due: 3/20/2024  Plan of Care Certification Period: 2/29/2024  UPDATE TO  04/05/24 6 weeks  Visit #/Visits authorized: 16/20     Time In:  1:22 pm   Time Out: 2:12 pm   Total Billable Time:  50 minutes  INSURANCE and OUTCOMES: Fee for Service with FOTO Outcomes 2/3     Precautions: standard, [place] on L ear    Pattern of pain determined: 1 Movement responder    Subjective   Valentin reports he has very little pain. He did a lot of work in the garden after last visit and was sore the next day: he isn't sure if the soreness is from the therapy or the gardening. Overall he is feeling much better and is able to do all his gardening and household projects that he wasn't able to do before he started therapy. He is tired today because he worked in his garden all morning and hasn't eaten yet today.    Patient reports their pain to be 2/10 on a 0-10 scale with 0 being no pain and 10 being the worst pain imaginable.  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 R minimal loss, pain R low back WFL WFL   Rotation Right PT observes contralateral shoulder within functional limits within functional limits WFL WFL   Rotation Left PT observes contralateral shoulder within functional limits within functional limits WFL WFL     Baseline Isometric Testing on Med X equipment: Testing administered by PT  Date of testing: 10/23/2023  ROM 0-54 deg   Max Peak Torque 202    Min Peak Torque 101    Flex/Ext Ratio 2:1   % below normative data 20     Midpoint Isometric Testing on Med X equipment: Testing administered by PT  Date of testin2023  ROM 0-54 deg   Max Peak Torque 219   Min Peak Torque 113   Flex/Ext Ratio 1.9:1   % below normative data 14     Outcomes:  Initial score: 51% limitation  Visit 10 score: 55% limitation   Discharge:  Goal: 35% limitation     Treatment    Valentin received the treatments listed below:      Valentin received neuromuscular education  to isolate and engage spinal stabilization musculature correctly for motor control and coordination to aid in function and posture for 10 minutes on the Medical Medx Machine.  Patient performed MedX dynamic exercise with emphasis on spinal muscular control using pacer throughout  active range of motion. Therapist assisted patient in achieving optimal exertion for neural reeducation and endurance training by using the  Ehsan Exertion Rating scale, by instructing the patient to aim for mid range of exertion, performing 15-20 repetitions, slowly, correctly,and safely.           2024     1:34 PM   [facility]   Visit Number 16   VAS Pain Rating 2   Treadmill Time (in min.) 5 min   Extension in Lying 10   Flexion in Lying 10   Lumbar Flexion 57   Lumbar Extension 0    Lumbar Weight 115 lbs   Repetitions 15   Rating of Perceived Exertion 4            Therapeutic exercises to develop strength, endurance, ROM, and core stabilization for 40 minutes including:    Treadmill 5 min     DKTC c/ SB 5 sec hold x15  Open Book x15 each side  EIL x10  Cat/Cow x 10   SOC x 20  Bridges + Black band x 20  Bird dog x 10 each ( Cues for level pelvis)    NP:   Paloff press at cable column 20# x 15    LTR x10  TrA + SLR + 2# x 10  EISw/towel x10  PPT 3 sec hold x10 (cues)  SL clamshells + GTB x10      Peripheral muscle strengthening which included 1 set of 15-20 repetitions at a slow, controlled 10-13 second per rep pace focused on strengthening supporting musculature for improved body mechanics and functional mobility.  Pt and therapist focused on proper form during treatment to ensure optimal strengthening of each targeted muscle group.  Machines were utilized including torso rotation, leg press, hip abd and hip add, leg ext.  Leg curl, triceps, biceps, chest and row added visit 3      Valentin participated in dynamic functional therapeutic activities to improve functional performance for 00  minutes, including:    Simulated truck/load unload   20# KB high platform to overhead x 10   20# KB low platform low to high platform 10 ft walk between, cue to maintain neutral spine throughout   20# box uneven weight distribution in box 15 ft walk to mid height platform to mid height shelf (truck bed)   Olympic bar lift from 12 in step x 10 each side       manual therapy techniques: were applied for 00 minutes, including:  Long axis distraction (B)LE, Short axis distraction (R)LE   Lumbar Right UPAs L2-L5 in prone, grade III-IV oscillations--NP    Patient received cold pack for 5 minutes to low back in [place].    Home Exercises Provided and Patient Education Provided   Home exercises include:   DKTC, bridges, EIL, EIS, SOC, LTR, open book, cat/cow, bird dog    Cardio program: visit 5 11/6/23  Lifting education  date: visit 11  Posture/Lumbar roll:  not obtained as of 02/20/24  [facility] Discharge handout (date given):  Equipment at home/gym membership:       Education provided:   - use of mobility HEP for proactive means    Written Home Exercises Provided: Patient instructed to cont prior HEP. Updated HEP 1/23/24 per patient request to include: LTR, DKTC, open book, EIL, EIS, Bridging with band, Cat/cow and Bird dog ex  Exercises were reviewed and Valentin was able to demonstrate them prior to the end of the session.  Valentin demonstrated good  understanding of the education provided.     See EMR under Patient Instructions for exercises provided prior visits.    Assessment     Patient reports overall improvement in pain and improved function in household projects/gardening. He required cueing to maintain neutral pelvic during bird dogs, but otherwise was independent with therapeutic exercises. Increased flexion ROM today on Lumbar MedX to 57. He was able to complete 15 reps at 115 ft/lbs with an exertion rating of 4/10. Overall he tolerated all treatement well, noting fatigue following session, which he attributed to not having eaten today. Shortened session today due to fatigue. Trial increased repetitions on Lumbar MedX next visit.       Patient is making  progress towards established goals.  Pt will continue to benefit from skilled outpatient physical therapy to address the deficits stated in the impairment chart, provide pt/family education and to maximize pt's level of independence in the home and community environment.     Anticipated Barriers for therapy: none  Pt's spiritual, cultural and educational needs considered and pt agreeable to plan of care and goals as stated below:     Goals:   Short term goals:  6 weeks or 10 visits   - Pt will demonstrate increased MedX average isometric strength value by 12% from initial test resulting in improved ability to perform bending, lifting, and carrying activities safely,  confidently. Appropriate and Ongoing  - Pt will report a reduction in worst pain score by 1-2 points for improved tolerance for sitting tolerance . Appropriate and Ongoing  - Pt able to perform HEP correctly with minimal cueing or supervision from therapist to encourage independent management of symptoms. Appropriate and Ongoing     Long term goals: 10 weeks or 20 visits   - Pt will demonstrate increased lumbar ROM by at least 3 degrees from initial ROM value, resulting in improved ability to perform functional forward bending while standing and sitting. Appropriate and Ongoing  - Pt will demonstrate increased MedX average isometric strength value by 20% from initial test resulting in improved ability to perform bending, lifting, and carrying activities safely and confidently. Appropriate and Ongoing  - Pt to demonstrate ability to independently control and reduce their pain through posture positioning and mechanical movements throughout a typical day. Appropriate and Ongoing  - Pt will demonstrate reduced pain and improved functional outcomes as reported on the Oswestry Disability Index by reaching a score of 35 or less in order to demonstrate subjective improvement in pt's condition.   Appropriate and Ongoing  - Pt will demonstrate independence with the HEP at discharge. Appropriate and Ongoing  - Pt will be able to confidently lift a case of water without increase in low back pain (patient goal) MET 02/20/24    Plan     POC UPDATE TO  04/05/24 6 weeks    Continue with established Plan of Care towards established PT goals.     Therapist: Gayle Palumbo, PT  Co-treated with Rona Marie PT  2/22/2024

## 2024-02-29 ENCOUNTER — CLINICAL SUPPORT (OUTPATIENT)
Dept: REHABILITATION | Facility: HOSPITAL | Age: 64
End: 2024-02-29
Payer: COMMERCIAL

## 2024-02-29 DIAGNOSIS — R29.898 DECREASED STRENGTH OF TRUNK AND BACK: Primary | ICD-10-CM

## 2024-02-29 DIAGNOSIS — R68.89 DECREASED FUNCTIONAL ACTIVITY TOLERANCE: ICD-10-CM

## 2024-02-29 PROCEDURE — 97110 THERAPEUTIC EXERCISES: CPT | Mod: CQ

## 2024-02-29 PROCEDURE — 97112 NEUROMUSCULAR REEDUCATION: CPT | Mod: CQ

## 2024-02-29 NOTE — PROGRESS NOTES
[facility]      Name: Valentin Jensen Hajj  Clinic Number: [id number]    Therapy Diagnosis:   Encounter Diagnoses   Name Primary?    Decreased strength of trunk and back Yes    Decreased functional activity tolerance          Physician: Kavin Adam MD    Visit Date: 2/29/2024    Physician Orders: PT Eval and Treat  Medical Diagnosis:    Lumbar radiculopathy      Vertebrogenic low back pain      Back pain, unspecified back location, unspecified back pain laterality, unspecified chronicity    Evaluation Date: 10/23/2023  Authorization Period Expiration: 12/31/2024   Reassessment Due: 3/20/2024  Plan of Care Certification Period: 2/29/2024  UPDATE TO  04/05/24   Visit #/Visits authorized: 17/20     Time In:  2:30 pm   Time Out: 3:30 pm   Total Billable Time: 55 minutes  INSURANCE and OUTCOMES: Fee for Service with FOTO Outcomes 2/3     Precautions: standard, [place] on L ear    Pattern of pain determined: 1 Movement responder    Subjective   Valentin reports onset of increased ( R) Lower back pain after putting up an attic ladder by himself last week. States that he has been using Rx Voltaren gel to help manage symptoms.    Patient reports their pain to be 7/10 on a 0-10 scale with 0 being no pain and 10 being the worst pain imaginable.  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 R  minimal loss, pain R low back WFL WFL   Rotation Right PT observes contralateral shoulder within functional limits within functional limits WFL WFL   Rotation Left PT observes contralateral shoulder within functional limits within functional limits WFL WFL     Baseline Isometric Testing on Med X equipment: Testing administered by PT  Date of testing: 10/23/2023  ROM 0-54 deg   Max Peak Torque 202    Min Peak Torque 101    Flex/Ext Ratio 2:1   % below normative data 20     Midpoint Isometric Testing on Med X equipment: Testing administered by PT  Date of testin2023  ROM 0-54 deg   Max Peak Torque 219   Min Peak Torque 113   Flex/Ext Ratio 1.9:1   % below normative data 14     Outcomes:  Initial score: 51% limitation  Visit 10 score: 55% limitation   Discharge:  Goal: 35% limitation     Treatment    Valentin received the treatments listed below:      Valentin received neuromuscular education  to isolate and engage spinal stabilization musculature correctly for motor control and coordination to aid in function and posture for 10 minutes on the Medical Medx Machine.  Patient performed MedX dynamic exercise with emphasis on spinal muscular control using pacer throughout  active range of motion. Therapist assisted patient in achieving optimal exertion for neural reeducation and endurance training by using the  Ehsan Exertion Rating scale, by instructing the patient to aim for mid range of exertion, performing 15-20 repetitions, slowly, correctly,and safely.         2024     3:37 PM   [facility] - [facility]   Visit Number 17   VAS Pain Rating 7   Treadmill Time (in min.) 5 min   Extension in Lying 20   Extension in Standing 10   Flexion in Lying 10   Manual Therapy 5 mins.   Lumbar Weight 110 lbs   Repetitions 20   Rating of Perceived Exertion 4        Therapeutic exercises to develop strength, endurance, ROM, and core stabilization for 40 minutes including:    + Supine QL stretch 5 x 10 sec   LTR x 10  DKTC c/  SB 5 sec hold x10  Open Book x15 each side  EIL x10, x 10 with therapist overpressure  Cat/Cow x 10   Bridges + Black band x 20  EIS x 10      NP:   Paloff press at cable column 20# x 15    TrA + SLR + 2# x 10  PPT 3 sec hold x10 (cues)  SL clamshells + GTB x10  Bird dog x 10 each ( Cues for level pelvis)  SOC x 10    Peripheral muscle strengthening which included 1 set of 15-20 repetitions at a slow, controlled 10-13 second per rep pace focused on strengthening supporting musculature for improved body mechanics and functional mobility.  Pt and therapist focused on proper form during treatment to ensure optimal strengthening of each targeted muscle group.  Machines were utilized including torso rotation, leg press, hip abd and hip add, leg ext.  Leg curl, triceps, biceps, chest and row added visit 3      Valentin participated in dynamic functional therapeutic activities to improve functional performance for 00  minutes, including:    Simulated truck/load unload   20# KB high platform to overhead x 10   20# KB low platform low to high platform 10 ft walk between, cue to maintain neutral spine throughout   20# box uneven weight distribution in box 15 ft walk to mid height platform to mid height shelf (truck bed)   Olympic bar lift from 12 in step x 10 each side       manual therapy techniques: were applied for 5 minutes, including: Theragun massage to R lumbar paraspinals    Patient received cold pack for 5 minutes to low back in Z-lie.    Home Exercises Provided and Patient Education Provided   Home exercises include:   DKTC, bridges, EIL, EIS, SOC, LTR, open book, cat/cow, bird dog    Cardio program: visit 5 11/6/23  Lifting education date: visit 11  Posture/Lumbar roll:  not obtained as of 02/20/24  [facility] given[facility]  Equipment at home/gym membership:       Education provided:   - Cues w/ex's  -2/29/24 reviewed lifting education to utilize at least 2 person assist for heavy  objects    Written Home Exercises Provided: Patient instructed to cont prior HEP. Updated HEP 1/23/24 per patient request to include: LTR, DKTC, open book, EIL, EIS, Bridging with band, Cat/cow and Bird dog ex  Exercises were reviewed and Valentin was able to demonstrate them prior to the end of the session.  Valentin demonstrated good  understanding of the education provided.     See EMR under Patient Instructions for exercises provided prior visits.    Assessment   Valentin returns with elevated R lower back pain after installing an attic ladder by himself last week. Pain level rated as 7/10. Principles of lifting education were reviewed again to utilize 2 person assist for heavy objects which he voiced understanding but states that he is stubborn. Treatment continued with flexibility, strengthening and neuromuscular reeducation ex's. Added Supine QL stretch today which felt good per subjective report. Also added manual techniques to include: therapist over pressure with EIL and theragun massage which provides fair relief. Due to recent increased pain symptoms, his lumbar MedX resistance was decreased to 110 ft/lbs and he completed 20 reps with a RPE = 4/10. He complete peripheral ex's without increased symptoms (15 reps for all peripherals today). Will continue to progress per HB Protocol and patient tolerance.     Patient is making progress towards established goals.  Pt will continue to benefit from skilled outpatient physical therapy to address the deficits stated in the impairment chart, provide pt/family education and to maximize pt's level of independence in the home and community environment.     Anticipated Barriers for therapy: none  Pt's spiritual, cultural and educational needs considered and pt agreeable to plan of care and goals as stated below:     Goals:   Short term goals:  6 weeks or 10 visits   - Pt will demonstrate increased MedX average isometric strength value by 12% from initial test resulting in  improved ability to perform bending, lifting, and carrying activities safely, confidently. Appropriate and Ongoing  - Pt will report a reduction in worst pain score by 1-2 points for improved tolerance for sitting tolerance . Appropriate and Ongoing  - Pt able to perform HEP correctly with minimal cueing or supervision from therapist to encourage independent management of symptoms. Appropriate and Ongoing     Long term goals: 10 weeks or 20 visits   - Pt will demonstrate increased lumbar ROM by at least 3 degrees from initial ROM value, resulting in improved ability to perform functional forward bending while standing and sitting. Appropriate and Ongoing  - Pt will demonstrate increased MedX average isometric strength value by 20% from initial test resulting in improved ability to perform bending, lifting, and carrying activities safely and confidently. Appropriate and Ongoing  - Pt to demonstrate ability to independently control and reduce their pain through posture positioning and mechanical movements throughout a typical day. Appropriate and Ongoing  - Pt will demonstrate reduced pain and improved functional outcomes as reported on the Oswestry Disability Index by reaching a score of 35 or less in order to demonstrate subjective improvement in pt's condition.   Appropriate and Ongoing  - Pt will demonstrate independence with the HEP at discharge. Appropriate and Ongoing  - Pt will be able to confidently lift a case of water without increase in low back pain (patient goal) MET 02/20/24    Plan   Continue with established Plan of Care towards established PT goals.     Therapist: Stanford Avelar, PTA    2/29/2024

## 2024-03-05 ENCOUNTER — CLINICAL SUPPORT (OUTPATIENT)
Dept: REHABILITATION | Facility: HOSPITAL | Age: 64
End: 2024-03-05
Payer: COMMERCIAL

## 2024-03-05 DIAGNOSIS — R29.898 DECREASED STRENGTH OF TRUNK AND BACK: Primary | ICD-10-CM

## 2024-03-05 PROCEDURE — 97112 NEUROMUSCULAR REEDUCATION: CPT

## 2024-03-05 PROCEDURE — 97110 THERAPEUTIC EXERCISES: CPT

## 2024-03-05 NOTE — PROGRESS NOTES
[facility]      Name: Valentin Teranjj  Clinic Number: [id number]    Therapy Diagnosis:   No diagnosis found.        Physician: Kavin Adam MD    Visit Date: 3/5/2024    Physician Orders: PT Eval and Treat  Medical Diagnosis:    Lumbar radiculopathy      Vertebrogenic low back pain      Back pain, unspecified back location, unspecified back pain laterality, unspecified chronicity    Evaluation Date: 10/23/2023  Authorization Period Expiration: 12/31/2024   Reassessment Due: 3/20/2024  Plan of Care Certification Period: 2/29/2024  UPDATE TO  04/05/24   Visit #/Visits authorized: 18/20     Time In:  250   Time Out: 350  Total Billable Time: 55 minutes  INSURANCE and OUTCOMES: Fee for Service with FOTO Outcomes 2/3     Precautions: standard, [place] on L ear    Pattern of pain determined: 1 Movement responder    Subjective   Valentin reports R sided LBP has decreased from last week    Patient reports their pain to be2/10 on a 0-10 scale with 0 being no pain and 10 being the worst pain imaginable.  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 R minimal loss, pain R low back WFL WFL   Rotation Right PT observes contralateral shoulder within functional limits within functional limits WFL WFL   Rotation Left PT observes contralateral shoulder within functional limits within  functional limits WFL WFL     Baseline Isometric Testing on Med X equipment: Testing administered by PT  Date of testing: 10/23/2023  ROM 0-54 deg   Max Peak Torque 202    Min Peak Torque 101    Flex/Ext Ratio 2:1   % below normative data 20     Midpoint Isometric Testing on Med X equipment: Testing administered by PT  Date of testin2023  ROM 0-54 deg   Max Peak Torque 219   Min Peak Torque 113   Flex/Ext Ratio 1.9:1   % below normative data 14     Outcomes:  Initial score: 51% limitation  Visit 10 score: 55% limitation   Discharge:  Goal: 35% limitation     Treatment    Valentin received the treatments listed below:      Valentin received neuromuscular education  to isolate and engage spinal stabilization musculature correctly for motor control and coordination to aid in function and posture for 10 minutes on the Medical Medx Machine.  Patient performed MedX dynamic exercise with emphasis on spinal muscular control using pacer throughout  active range of motion. Therapist assisted patient in achieving optimal exertion for neural reeducation and endurance training by using the  Ehsan Exertion Rating scale, by instructing the patient to aim for mid range of exertion, performing 15-20 repetitions, slowly, correctly,and safely.         3/5/2024     3:22 PM   [facility]   Visit Number 18   VAS Pain Rating 2   Treadmill Time (in min.) 5 min   Extension in Lying 20   Extension in Standing 10   Flexion in Lying 10   Manual Therapy 5 mins.   Lumbar Weight 110 lbs   Repetitions 20   Rating of Perceived Exertion 4   Ice - Z Lie (in min.) 5         Therapeutic exercises to develop strength, endurance, ROM, and core stabilization for 50 minutes including:    + Supine QL stretch 5 x 10 sec   LTR x 10  DKTC c/ SB 5 sec hold x10  Open Book x15 each side  EIL x10, x 10 with therapist overpressure  Cat/Cow x 10   Bridges + Black band x 20  EIS x 10      NP:   Paloff press at cable column 20# x 15    TrA + SLR + 2# x  10  PPT 3 sec hold x10 (cues)  SL clamshells + GTB x10  Bird dog x 10 each ( Cues for level pelvis)  SOC x 10    Peripheral muscle strengthening which included 1 set of 15-20 repetitions at a slow, controlled 10-13 second per rep pace focused on strengthening supporting musculature for improved body mechanics and functional mobility.  Pt and therapist focused on proper form during treatment to ensure optimal strengthening of each targeted muscle group.  Machines were utilized including torso rotation, leg press, hip abd and hip add, leg ext.  Leg curl, triceps, biceps, chest and row added visit 3      Valentin participated in dynamic functional therapeutic activities to improve functional performance for 00  minutes, including:    Simulated truck/load unload   20# KB high platform to overhead x 10   20# KB low platform low to high platform 10 ft walk between, cue to maintain neutral spine throughout   20# box uneven weight distribution in box 15 ft walk to mid height platform to mid height shelf (truck bed)   Olympic bar lift from 12 in step x 10 each side       manual therapy techniques: were applied for 5 minutes, including: massage to R lumbar paraspinals    Patient received cold pack for 5 minutes to low back in Z-lie.    Home Exercises Provided and Patient Education Provided   Home exercises include:   DKTC, bridges, EIL, EIS, SOC, LTR, open book, cat/cow, bird dog    Cardio program: visit 5 11/6/23  Lifting education date: visit 11  Posture/Lumbar roll:  not obtained as of 02/20/24  [facility] [place] Discharge handout (date given):  Equipment at home/gym membership:       Education provided:   - Cues w/ex's  -2/29/24 reviewed lifting education to utilize at least 2 person assist for heavy objects    Written Home Exercises Provided: Patient instructed to cont prior HEP. Updated HEP 1/23/24 per patient request to include: LTR, DKTC, open book, EIL, EIS, Bridging with band, Cat/cow and Bird dog ex  Exercises were  reviewed and Valentin was able to demonstrate them prior to the end of the session.  Valentin demonstrated good  understanding of the education provided.     See EMR under Patient Instructions for exercises provided prior visits.    Assessment   Valentin returns with min LB discomfort today. Educated pt again on not lifting heavy items by himself.  Pt instructed to ice and stretch gently when pain is elevated.  Treatment continued with flexibility, strengthening and neuromuscular reeducation ex's without difficulty.  Maintained Med X weight at 110ft/lbs with pt completing 20 reps at 4/10 exertion level.  Inc 5% next visit  Patient is making progress towards established goals.  Pt will continue to benefit from skilled outpatient physical therapy to address the deficits stated in the impairment chart, provide pt/family education and to maximize pt's level of independence in the home and community environment.     Anticipated Barriers for therapy: none  Pt's spiritual, cultural and educational needs considered and pt agreeable to plan of care and goals as stated below:     Goals:   Short term goals:  6 weeks or 10 visits   - Pt will demonstrate increased MedX average isometric strength value by 12% from initial test resulting in improved ability to perform bending, lifting, and carrying activities safely, confidently. Appropriate and Ongoing  - Pt will report a reduction in worst pain score by 1-2 points for improved tolerance for sitting tolerance . Appropriate and Ongoing  - Pt able to perform HEP correctly with minimal cueing or supervision from therapist to encourage independent management of symptoms. Appropriate and Ongoing     Long term goals: 10 weeks or 20 visits   - Pt will demonstrate increased lumbar ROM by at least 3 degrees from initial ROM value, resulting in improved ability to perform functional forward bending while standing and sitting. Appropriate and Ongoing  - Pt will demonstrate increased MedX average  isometric strength value by 20% from initial test resulting in improved ability to perform bending, lifting, and carrying activities safely and confidently. Appropriate and Ongoing  - Pt to demonstrate ability to independently control and reduce their pain through posture positioning and mechanical movements throughout a typical day. Appropriate and Ongoing  - Pt will demonstrate reduced pain and improved functional outcomes as reported on the Oswestry Disability Index by reaching a score of 35 or less in order to demonstrate subjective improvement in pt's condition.   Appropriate and Ongoing  - Pt will demonstrate independence with the HEP at discharge. Appropriate and Ongoing  - Pt will be able to confidently lift a case of water without increase in low back pain (patient goal) MET 02/20/24    Plan   Continue with established Plan of Care towards established PT goals.     Therapist: Rona Marie, PT    3/5/2024

## 2024-03-08 ENCOUNTER — OFFICE VISIT (OUTPATIENT)
Dept: INTERNAL MEDICINE | Facility: CLINIC | Age: 64
End: 2024-03-08
Attending: INTERNAL MEDICINE
Payer: COMMERCIAL

## 2024-03-08 ENCOUNTER — LAB VISIT (OUTPATIENT)
Dept: LAB | Facility: OTHER | Age: 64
End: 2024-03-08
Attending: INTERNAL MEDICINE
Payer: COMMERCIAL

## 2024-03-08 VITALS
HEART RATE: 52 BPM | WEIGHT: 240.75 LBS | BODY MASS INDEX: 28.55 KG/M2 | OXYGEN SATURATION: 98 % | DIASTOLIC BLOOD PRESSURE: 74 MMHG | SYSTOLIC BLOOD PRESSURE: 128 MMHG

## 2024-03-08 DIAGNOSIS — Z00.00 ANNUAL PHYSICAL EXAM: ICD-10-CM

## 2024-03-08 DIAGNOSIS — D33.3 LEFT-SIDED ACOUSTIC NEUROMA: ICD-10-CM

## 2024-03-08 DIAGNOSIS — Z12.5 PROSTATE CANCER SCREENING: ICD-10-CM

## 2024-03-08 DIAGNOSIS — Z00.00 ANNUAL PHYSICAL EXAM: Primary | ICD-10-CM

## 2024-03-08 DIAGNOSIS — I47.10 SVT (SUPRAVENTRICULAR TACHYCARDIA): ICD-10-CM

## 2024-03-08 DIAGNOSIS — D36.10 SCHWANNOMA: ICD-10-CM

## 2024-03-08 LAB
ALBUMIN SERPL BCP-MCNC: 4.4 G/DL (ref 3.5–5.2)
ALP SERPL-CCNC: 61 U/L (ref 55–135)
ALT SERPL W/O P-5'-P-CCNC: 16 U/L (ref 10–44)
ANION GAP SERPL CALC-SCNC: 9 MMOL/L (ref 8–16)
AST SERPL-CCNC: 18 U/L (ref 10–40)
BASOPHILS # BLD AUTO: 0.04 K/UL (ref 0–0.2)
BASOPHILS NFR BLD: 0.5 % (ref 0–1.9)
BILIRUB SERPL-MCNC: 0.6 MG/DL (ref 0.1–1)
BUN SERPL-MCNC: 15 MG/DL (ref 8–23)
CALCIUM SERPL-MCNC: 9.7 MG/DL (ref 8.7–10.5)
CHLORIDE SERPL-SCNC: 106 MMOL/L (ref 95–110)
CHOLEST SERPL-MCNC: 176 MG/DL (ref 120–199)
CHOLEST/HDLC SERPL: 3.7 {RATIO} (ref 2–5)
CO2 SERPL-SCNC: 26 MMOL/L (ref 23–29)
COMPLEXED PSA SERPL-MCNC: 0.82 NG/ML (ref 0–4)
CREAT SERPL-MCNC: 0.9 MG/DL (ref 0.5–1.4)
DIFFERENTIAL METHOD BLD: NORMAL
EOSINOPHIL # BLD AUTO: 0.1 K/UL (ref 0–0.5)
EOSINOPHIL NFR BLD: 1.4 % (ref 0–8)
ERYTHROCYTE [DISTWIDTH] IN BLOOD BY AUTOMATED COUNT: 12.8 % (ref 11.5–14.5)
EST. GFR  (NO RACE VARIABLE): >60 ML/MIN/1.73 M^2
ESTIMATED AVG GLUCOSE: 111 MG/DL (ref 68–131)
GLUCOSE SERPL-MCNC: 96 MG/DL (ref 70–110)
HBA1C MFR BLD: 5.5 % (ref 4–5.6)
HCT VFR BLD AUTO: 45.6 % (ref 40–54)
HDLC SERPL-MCNC: 47 MG/DL (ref 40–75)
HDLC SERPL: 26.7 % (ref 20–50)
HGB BLD-MCNC: 14.9 G/DL (ref 14–18)
IMM GRANULOCYTES # BLD AUTO: 0.02 K/UL (ref 0–0.04)
IMM GRANULOCYTES NFR BLD AUTO: 0.3 % (ref 0–0.5)
LDLC SERPL CALC-MCNC: 99.4 MG/DL (ref 63–159)
LYMPHOCYTES # BLD AUTO: 2.4 K/UL (ref 1–4.8)
LYMPHOCYTES NFR BLD: 31.4 % (ref 18–48)
MCH RBC QN AUTO: 29.6 PG (ref 27–31)
MCHC RBC AUTO-ENTMCNC: 32.7 G/DL (ref 32–36)
MCV RBC AUTO: 91 FL (ref 82–98)
MONOCYTES # BLD AUTO: 0.7 K/UL (ref 0.3–1)
MONOCYTES NFR BLD: 9.4 % (ref 4–15)
NEUTROPHILS # BLD AUTO: 4.4 K/UL (ref 1.8–7.7)
NEUTROPHILS NFR BLD: 57 % (ref 38–73)
NONHDLC SERPL-MCNC: 129 MG/DL
NRBC BLD-RTO: 0 /100 WBC
PLATELET # BLD AUTO: 221 K/UL (ref 150–450)
PMV BLD AUTO: 10.9 FL (ref 9.2–12.9)
POTASSIUM SERPL-SCNC: 4.6 MMOL/L (ref 3.5–5.1)
PROT SERPL-MCNC: 7.7 G/DL (ref 6–8.4)
RBC # BLD AUTO: 5.04 M/UL (ref 4.6–6.2)
SODIUM SERPL-SCNC: 141 MMOL/L (ref 136–145)
TRIGL SERPL-MCNC: 148 MG/DL (ref 30–150)
TSH SERPL DL<=0.005 MIU/L-ACNC: 0.97 UIU/ML (ref 0.4–4)
WBC # BLD AUTO: 7.77 K/UL (ref 3.9–12.7)

## 2024-03-08 PROCEDURE — 80053 COMPREHEN METABOLIC PANEL: CPT | Performed by: INTERNAL MEDICINE

## 2024-03-08 PROCEDURE — 3078F DIAST BP <80 MM HG: CPT | Mod: CPTII,S$GLB,, | Performed by: INTERNAL MEDICINE

## 2024-03-08 PROCEDURE — 3008F BODY MASS INDEX DOCD: CPT | Mod: CPTII,S$GLB,, | Performed by: INTERNAL MEDICINE

## 2024-03-08 PROCEDURE — 1160F RVW MEDS BY RX/DR IN RCRD: CPT | Mod: CPTII,S$GLB,, | Performed by: INTERNAL MEDICINE

## 2024-03-08 PROCEDURE — 36415 COLL VENOUS BLD VENIPUNCTURE: CPT | Performed by: INTERNAL MEDICINE

## 2024-03-08 PROCEDURE — 3074F SYST BP LT 130 MM HG: CPT | Mod: CPTII,S$GLB,, | Performed by: INTERNAL MEDICINE

## 2024-03-08 PROCEDURE — 99396 PREV VISIT EST AGE 40-64: CPT | Mod: S$GLB,,, | Performed by: INTERNAL MEDICINE

## 2024-03-08 PROCEDURE — 1159F MED LIST DOCD IN RCRD: CPT | Mod: CPTII,S$GLB,, | Performed by: INTERNAL MEDICINE

## 2024-03-08 PROCEDURE — 99999 PR PBB SHADOW E&M-EST. PATIENT-LVL III: CPT | Mod: PBBFAC,,, | Performed by: INTERNAL MEDICINE

## 2024-03-08 PROCEDURE — 84443 ASSAY THYROID STIM HORMONE: CPT | Performed by: INTERNAL MEDICINE

## 2024-03-08 PROCEDURE — 85025 COMPLETE CBC W/AUTO DIFF WBC: CPT | Performed by: INTERNAL MEDICINE

## 2024-03-08 PROCEDURE — 84153 ASSAY OF PSA TOTAL: CPT | Performed by: INTERNAL MEDICINE

## 2024-03-08 PROCEDURE — 83036 HEMOGLOBIN GLYCOSYLATED A1C: CPT | Performed by: INTERNAL MEDICINE

## 2024-03-08 PROCEDURE — 80061 LIPID PANEL: CPT | Performed by: INTERNAL MEDICINE

## 2024-03-08 RX ORDER — TAMSULOSIN HYDROCHLORIDE 0.4 MG/1
0.4 CAPSULE ORAL DAILY
Qty: 90 CAPSULE | Refills: 2 | Status: SHIPPED | OUTPATIENT
Start: 2024-03-08 | End: 2025-03-08

## 2024-03-08 NOTE — PROGRESS NOTES
"Subjective:       Patient ID: Valentin Howell is a 63 y.o. male.    Chief Complaint: Annual Exam    Here for annual exam    Admitted 08/2023 for CP. Details below. R/o ACS. Normal stress. Concern for electrical.     Mr Mikey Howell was admitted to observation for chest pain rule out. Pt afebrile and hemodynamically stable. Troponin negative X 3. TTE>>There is concentric remodeling. Normal wall motion. There is normal systolic function with a visually estimated ejection fraction of 55 - 70%. There is normal diastolic function. Dobutamine stress echo>>The ECG portion of the study is negative for ischemia. The ECG portion of the study is negative for ischemia. There were no arrhythmias during stress. No convincing scintigraphic evidence for ischemia or infarct. The global left ventricular systolic function is normal with an LV ejection fraction of 62 % noting mild LV dilatation. Wall motion is normal. Chest pain resolved. Pt to follow up w/ OP cardiologist. Given cardiac event monitor prior to discharge.    ### HLD ###  Lipitor 20mg   Pt is tolerating statin without frequent muscle cramps or diffuse myalgias or weakness.     ### Schwannoma ###  MRI brain c/ and s/ done for asymmetrical hearing loss:Focal enhancement within the basal turn of the left cochlea, suspicious for labyrinthitis or possible small intra-cochlear schwannoma.   Initially with some mild hearing loss. Vertigo episodes starting 2022  [facility] 3/21/23 ENT Dr Avilez writes "Discussed 2 year follow up with MRI. An at-home trial with [place] exercises  MRI brain due 03/2024        ### GERD ###  Long standing hx  GI Dr Bond  Last EGD:   3/2023 Reports symptoms well controlled     ### lumbar DJD ###  -[facility]rio HPI: Low back pain for several years. Pain free for first few hours of the day but simply with ADLs develops low back pain, band across lumbar region, 8/10, non-radiating. Denies associated numbness/tingling of ext, weakness of LE, saddle anesthesia, or " bowel/bladder incontinence. Also notes that pain is much worse when rising after sitting for an hour. He denies any alleviating factors. Takes occasional NSAID         Review of Systems   Constitutional:  Negative for appetite change, chills, fever and unexpected weight change.   HENT:  Negative for hearing loss, sore throat and trouble swallowing.    Eyes:  Negative for visual disturbance.   Respiratory:  Negative for cough, chest tightness and shortness of breath.    Cardiovascular:  Negative for chest pain and leg swelling.   Gastrointestinal:  Negative for abdominal pain, blood in stool, constipation, diarrhea, nausea and vomiting.   Endocrine: Negative for polydipsia and polyuria.   Genitourinary:  Negative for decreased urine volume, difficulty urinating, dysuria, frequency and urgency.   Musculoskeletal:  Negative for gait problem.   Skin:  Negative for rash.   Neurological:  Negative for dizziness and numbness.   Psychiatric/Behavioral:  The patient is not nervous/anxious.        Objective:      Vitals:    03/08/24 0956   BP: 128/74   BP Location: Left arm   Patient Position: Sitting   Pulse: (!) 52   SpO2: 98%   Weight: 109.2 kg (240 lb 11.9 oz)      Physical Exam  Vitals and nursing note reviewed.   Constitutional:       General: He is not in acute distress.     Appearance: Normal appearance. He is well-developed.   HENT:      Head: Normocephalic and atraumatic.      Mouth/Throat:      Pharynx: No oropharyngeal exudate.   Eyes:      General: No scleral icterus.     Conjunctiva/sclera: Conjunctivae normal.      Pupils: Pupils are equal, round, and reactive to light.   Neck:      Thyroid: No thyromegaly.   Cardiovascular:      Rate and Rhythm: Normal rate and regular rhythm.      Heart sounds: Normal heart sounds. No murmur heard.  Pulmonary:      Effort: Pulmonary effort is normal.      Breath sounds: Normal breath sounds. No wheezing or rales.   Abdominal:      General: There is no distension.    Musculoskeletal:         General: No tenderness.   Lymphadenopathy:      Cervical: No cervical adenopathy.   Skin:     General: Skin is warm and dry.   Neurological:      Mental Status: He is alert and oriented to person, place, and time.   Psychiatric:         Behavior: Behavior normal.         Assessment:       1. Annual physical exam    2. Prostate cancer screening    3. SVT (supraventricular tachycardia)    4. Schwannoma    5. Left-sided acoustic neuroma    6. SVT (supraventricular tachycardia)        Plan:       Valentin was seen today for annual exam.    Diagnoses and all orders for this visit:    Annual physical exam  -     Comprehensive Metabolic Panel; Future  -     Lipid Panel; Future  -     TSH; Future  -     CBC Auto Differential; Future  -     Hemoglobin A1C; Future    Prostate cancer screening  -     PSA, Screening; Future    SVT (supraventricular tachycardia)    Schwannoma  -     MRI Brain W WO Contrast; Future    Left-sided acoustic neuroma    SVT (supraventricular tachycardia)    BPH  -     TRIAL tamsulosin (FLOMAX) 0.4 mg Cap; Take 1 capsule (0.4 mg total) by mouth once daily.           Francisco Huynh MD  Internal Medicine-[facility]        Side effects of medication(s) were discussed in detail and patient voiced understanding.  Patient will call back for any issues or complications.

## 2024-03-27 ENCOUNTER — HOSPITAL ENCOUNTER (OUTPATIENT)
Dept: RADIOLOGY | Facility: OTHER | Age: 64
Discharge: HOME | End: 2024-03-27
Attending: INTERNAL MEDICINE
Payer: COMMERCIAL

## 2024-03-27 DIAGNOSIS — D36.10 SCHWANNOMA: ICD-10-CM

## 2024-03-27 PROCEDURE — 25500020 PHARM REV CODE 255: Performed by: INTERNAL MEDICINE

## 2024-03-27 PROCEDURE — A9585 GADOBUTROL INJECTION: HCPCS | Performed by: INTERNAL MEDICINE

## 2024-03-27 PROCEDURE — 70553 MRI BRAIN STEM W/O & W/DYE: CPT | Mod: 26,,, | Performed by: RADIOLOGY

## 2024-03-27 PROCEDURE — 70553 MRI BRAIN STEM W/O & W/DYE: CPT | Mod: TC

## 2024-03-27 RX ORDER — GADOBUTROL 604.72 MG/ML
10 INJECTION INTRAVENOUS
Status: COMPLETED | OUTPATIENT
Start: 2024-03-27 | End: 2024-03-27

## 2024-03-27 RX ADMIN — GADOBUTROL 10 ML: 604.72 INJECTION INTRAVENOUS at 08:03

## 2024-04-04 ENCOUNTER — LAB VISIT (OUTPATIENT)
Dept: LAB | Facility: OTHER | Age: 64
End: 2024-04-04
Attending: INTERNAL MEDICINE
Payer: COMMERCIAL

## 2024-04-04 ENCOUNTER — OFFICE VISIT (OUTPATIENT)
Dept: PAIN MEDICINE | Facility: CLINIC | Age: 64
End: 2024-04-04
Attending: ANESTHESIOLOGY
Payer: COMMERCIAL

## 2024-04-04 VITALS
DIASTOLIC BLOOD PRESSURE: 76 MMHG | BODY MASS INDEX: 28.37 KG/M2 | HEIGHT: 77 IN | HEART RATE: 58 BPM | WEIGHT: 240.31 LBS | SYSTOLIC BLOOD PRESSURE: 116 MMHG

## 2024-04-04 DIAGNOSIS — M54.16 LUMBAR RADICULOPATHY: Primary | ICD-10-CM

## 2024-04-04 DIAGNOSIS — M51.36 DDD (DEGENERATIVE DISC DISEASE), LUMBAR: ICD-10-CM

## 2024-04-04 DIAGNOSIS — M47.896 OTHER OSTEOARTHRITIS OF SPINE, LUMBAR REGION: ICD-10-CM

## 2024-04-04 DIAGNOSIS — M79.2 NEUROPATHIC PAIN: ICD-10-CM

## 2024-04-04 PROCEDURE — 83921 ORGANIC ACID SINGLE QUANT: CPT | Performed by: STUDENT IN AN ORGANIZED HEALTH CARE EDUCATION/TRAINING PROGRAM

## 2024-04-04 PROCEDURE — 36415 COLL VENOUS BLD VENIPUNCTURE: CPT | Performed by: STUDENT IN AN ORGANIZED HEALTH CARE EDUCATION/TRAINING PROGRAM

## 2024-04-04 PROCEDURE — 99999 PR PBB SHADOW E&M-EST. PATIENT-LVL III: CPT | Mod: PBBFAC,,, | Performed by: ANESTHESIOLOGY

## 2024-04-04 PROCEDURE — 99214 OFFICE O/P EST MOD 30 MIN: CPT | Mod: S$GLB,,, | Performed by: ANESTHESIOLOGY

## 2024-04-04 PROCEDURE — 82607 VITAMIN B-12: CPT | Performed by: STUDENT IN AN ORGANIZED HEALTH CARE EDUCATION/TRAINING PROGRAM

## 2024-04-04 PROCEDURE — 3044F HG A1C LEVEL LT 7.0%: CPT | Mod: CPTII,S$GLB,, | Performed by: ANESTHESIOLOGY

## 2024-04-04 PROCEDURE — 1159F MED LIST DOCD IN RCRD: CPT | Mod: CPTII,S$GLB,, | Performed by: ANESTHESIOLOGY

## 2024-04-04 PROCEDURE — 1160F RVW MEDS BY RX/DR IN RCRD: CPT | Mod: CPTII,S$GLB,, | Performed by: ANESTHESIOLOGY

## 2024-04-04 PROCEDURE — 3074F SYST BP LT 130 MM HG: CPT | Mod: CPTII,S$GLB,, | Performed by: ANESTHESIOLOGY

## 2024-04-04 PROCEDURE — 84207 ASSAY OF VITAMIN B-6: CPT | Performed by: STUDENT IN AN ORGANIZED HEALTH CARE EDUCATION/TRAINING PROGRAM

## 2024-04-04 PROCEDURE — 3078F DIAST BP <80 MM HG: CPT | Mod: CPTII,S$GLB,, | Performed by: ANESTHESIOLOGY

## 2024-04-04 PROCEDURE — 3008F BODY MASS INDEX DOCD: CPT | Mod: CPTII,S$GLB,, | Performed by: ANESTHESIOLOGY

## 2024-04-04 NOTE — PROGRESS NOTES
Chronic patient Established Note (Follow up visit)      SUBJECTIVE:    Pain Disability Index Review:      4/4/2024     3:06 PM 3/13/2023     8:18 AM   Last 3 PDI Scores   Pain Disability Index (PDI) 0 36     Interval History 4/4/2024:  Valentin Howell presents to the clinic for a follow-up appointment for low back pain (R>L). Since the last visit, Valentin Howell states the pain has been worsening. Patient endorses sharp, stabbing, shooting pain that radiates to his bilateral legs (R>L) with associated numbness, tingling, and weakness. Pain is exacerbated by activity, lifting, bending, climbing up stairs. Pain is improved with rest, heat, medications. Current medications Tylenol, lidocaine patches, Voltaren. Current pain intensity is 3/10. Worst pain with activity 10/10. Patient [facility] with reported increased strength, however, associated increased pain for the two days following each session. He continues with HEP. Patient denies red flags including weakness, unexpected weight loss/gain, night sweats/fevers, saddle anesthesia, and symptoms of CATY.    Interval History (8/14/23):  Valentin Howell presents tele-medicine appointment for a follow-up appointment for low back pain. Since the last visit, Valentin Howell states the pain has been persistant. Patient continues to endorse low back pain with radicular symptoms (R>L) that extends from his low back down the posterior lateral aspects of is legs to his calves bilaterally. Patient reports significant pain over the weekend after driving to [place] with difficulty walking after the drive. His son was able to get him a patch (patient unsure what kind of patch, but sounds like lidocaine patch) for the ride home which significantly improved his pain and he is doing well at this time. Current pain intensity is 4/10. Patient denies red flags including weakness, unexpected weight loss/gain, night sweats/fevers, saddle anesthesia, and symptoms of  LAYA        Original HISTORY OF PRESENT ILLNESS (3/13/23):   Valentin Howell presents to the clinic for the evaluation of the above pain. The pain started over 10 years ago.     Original Pain Description:  The pain is located in the low back and does not radiate. It is associated with numbness of the right lateral thigh and numbness of the left lateral calf. He states both of these come on suddenly while standing for longer than 1-2 hours but do not cause any pain. The back pain is described as aching and dull. Exacerbating factors: Standing, Bending, Extension, Flexing, Lifting, and Getting out of bed/chair. Mitigating factors laying down, medications (voltaren gel), and rest. Symptoms interfere with daily activity, sleeping, and work. The patient feels like symptoms have been worsening. Patient denies night fever/night sweats, urinary incontinence, bowel incontinence, and significant motor weakness. This is affecting his daily life as he has trouble standing for long durations at work.     Original PAIN SCORES:  Best: Pain is 2  Worst: Pain is 8  Current: Pain is 4     6 weeks of Conservative therapy:  PT: December 2021 - January 2022   Chiro: none  HEP: none currently, states that most recent physical therapy did not help symptoms     Treatments / Medications: (Ice/Heat/NSAIDS/APAP/etc):  Voltaren gel  Lidocaine patches  Tylenol     Interventional Pain Procedures: (Previous injections)  None      report:  Not applicable    Allergies: Review of patient's allergies indicates:  No Known Allergies    Current Medications:   Current Outpatient Medications   Medication Sig Dispense Refill    aspirin 81 MG Chew Take 1 tablet (81 mg total) by mouth once daily. 90 tablet 3    famotidine (PEPCID) 10 MG tablet Take 1 tablet (10 mg total) by mouth 2 (two) times daily as needed. 90 tablet 3    LIDOcaine (LIDODERM) 5 % Place 1 patch onto the skin once daily. Remove & Discard patch within 12 hours or as directed by MD 30 patch  2    metoprolol succinate (TOPROL-XL) 25 MG 24 hr tablet Take 1 tablet (25 mg total) by mouth once daily. 90 tablet 3    nitroGLYCERIN (NITROSTAT) 0.4 MG SL tablet Place 1 tablet (0.4 mg total) under the tongue every 5 (five) minutes as needed for Chest pain. 15 tablet 3    omeprazole (PRILOSEC) 40 MG capsule Take 40 mg by mouth 2 (two) times a day.      rosuvastatin (CRESTOR) 20 MG tablet Take 1 tablet (20 mg total) by mouth every evening. 90 tablet 3    sertraline (ZOLOFT) 25 MG tablet Take 25 mg by mouth once daily.      tamsulosin (FLOMAX) 0.4 mg Cap Take 1 capsule (0.4 mg total) by mouth once daily. 90 capsule 2     No current facility-administered medications for this visit.       REVIEW OF SYSTEMS:    GENERAL:  No weight loss, malaise or fevers.  HEENT:  Negative for frequent or significant headaches.  NECK:  Negative for lumps, goiter, pain and significant neck swelling.  RESPIRATORY:  Negative for cough, wheezing or shortness of breath.  CARDIOVASCULAR:  Negative for chest pain, leg swelling or palpitations.  GI:  Negative for abdominal discomfort, blood in stools or black stools or change in bowel habits.  MUSCULOSKELETAL:  See HPI.  SKIN:  Negative for lesions, rash, and itching.  PSYCH:  + for sleep disturbance, mood disorder and recent psychosocial stressors.  HEMATOLOGY/LYMPHOLOGY:  Negative for prolonged bleeding, bruising easily or swollen nodes.  NEURO:   No history of headaches, syncope, paralysis, seizures or tremors.  All other reviewed and negative other than HPI.    Past Medical History:  Past Medical History:   Diagnosis Date    Acid reflux     Hypercholesteremia     SVT (supraventricular tachycardia)        Past Surgical History:  Past Surgical History:   Procedure Laterality Date    ABDOMINAL HERNIA REPAIR      HEEL SPUR SURGERY Right     HERNIA REPAIR Bilateral     TONSILLECTOMY         Family History:  Family History   Problem Relation Age of Onset    Hyperlipidemia Father        Social  History:  Social History     Socioeconomic History    Marital status:    Tobacco Use    Smoking status: Never    Smokeless tobacco: Never   Substance and Sexual Activity    Alcohol use: Not Currently     Alcohol/week: 0.0 standard drinks of alcohol    Drug use: No    Sexual activity: Yes     Partners: Female     Social Determinants of Health     Financial Resource Strain: Low Risk  (8/6/2023)    Overall Financial Resource Strain (CARDIA)     Difficulty of Paying Living Expenses: Not hard at all   Food Insecurity: No Food Insecurity (8/6/2023)    Hunger Vital Sign     Worried About Running Out of Food in the Last Year: Never true     Ran Out of Food in the Last Year: Never true   Transportation Needs: No Transportation Needs (8/6/2023)    PRAPARE - Transportation     Lack of Transportation (Medical): No     Lack of Transportation (Non-Medical): No   Physical Activity: Insufficiently Active (8/6/2023)    Exercise Vital Sign     Days of Exercise per Week: 3 days     Minutes of Exercise per Session: 30 min   Stress: No Stress Concern Present (8/6/2023)    [place] of Occupational Health - Occupational Stress Questionnaire     Feeling of Stress : Only a little   Social Connections: Moderately Integrated (8/6/2023)    Social Connection and Isolation Panel [NHANES]     Frequency of Communication with Friends and Family: Three times a week     Frequency of Social Gatherings with Friends and Family: Three times a week     Attends Lutheran Services: 1 to 4 times per year     Active Member of Clubs or Organizations: No     Attends Club or Organization Meetings: Never     Marital Status: Living with partner   Housing Stability: Low Risk  (8/6/2023)    Housing Stability Vital Sign     Unable to Pay for Housing in the Last Year: No     Number of Places Lived in the Last Year: 1     Unstable Housing in the Last Year: No       OBJECTIVE:    /76 (BP Location: Left arm, Patient Position: Sitting, BP Method:  "Large (Automatic))   Pulse (!) 58   Ht 6' 5" (1.956 m)   Wt 109 kg (240 lb 4.8 oz)   BMI 28.50 kg/m²     PHYSICAL EXAMINATION:    General appearance: Well appearing, in no acute distress, alert and oriented x3.  Psych:  Mood and affect appropriate.  Skin: Skin color, texture, turgor normal, no rashes or lesions, in both upper and lower body.  Head/face:  Atraumatic, normocephalic. No palpable lymph nodes  Neck: No pain to palpation over the cervical paraspinous muscles. Spurling Negative. No pain with neck flexion, extension, or lateral flexion. .  Cor: RRR  Pulm: CTA  GI: Abdomen soft and non-tender.  Back: Straight leg raising in the sitting and supine positions is positive to radicular pain (R>L). Mild pain to palpation over the lumbar spine and paraspinal muscles (R>L). Normal range of motion without pain reproduction.  Extremities: Peripheral joint ROM is full and pain free without obvious instability or laxity in all four extremities. No deformities, edema, or skin discoloration. Good capillary refill.  Musculoskeletal: Shoulder, hip, sacroiliac and knee provocative maneuvers are negative. Bilateral upper and lower extremity strength is normal and symmetric.  No atrophy or tone abnormalities are noted. + Right GTB TTP  Neuro: Bilateral upper and lower extremity coordination and muscle stretch reflexes are physiologic and symmetric.  Plantar response are downgoing. No loss of sensation is noted.  Gait: Normal.    ASSESSMENT: 63 y.o. year old male with low back pain, consistent with:     1. Lumbar radiculopathy  EMG W/ ULTRASOUND AND NERVE CONDUCTION TEST 2 Extremities    CANCELED: EMG W/ ULTRASOUND AND NERVE CONDUCTION TEST 2 Extremities      2. DDD (degenerative disc disease), lumbar  EMG W/ ULTRASOUND AND NERVE CONDUCTION TEST 2 Extremities    CANCELED: EMG W/ ULTRASOUND AND NERVE CONDUCTION TEST 2 Extremities      3. Other osteoarthritis of spine, lumbar region        4. Neuropathic pain  VITAMIN B6    " Vitamin B12 Deficiency Panel          PLAN:     - Previous records and imaging reviewed  - I have stressed the importance of physical activity and a home exercise plan to help with pain and improve health.  - Continue HEP  - Patient can continue with medications for now since they are providing benefits, using them appropriately, and without side effects.  - Order Vitamin B-12 and Vitamin B-6 levels  - Recommend taking Vitamin B complex supplement  - Consider statin holiday to assess for related neuropathic symptoms, concern for sensitivity to statins  - Schedule for Bilateral Lower Extremity EMG for further evaluation of radicular symptoms  - In future, will consider ILESI at L4/5 vs. BL L4/5 TFESI for treatment of radicular symptoms  - RTC 4 weeks for follow up of lab work, EMG results, and possible interventions.  - Counseled patient regarding the importance of activity modification, constant sleeping habits, and physical therapy.      The above plan and management options were discussed at length with patient. Patient is in agreement with the above and verbalized understanding.    Kavin Adam M.D.  PGY-5  Interventional Pain Management Fellow  [facility]  Pager: [phone]   I have personally reviewed the history and exam of this patient and agree with the resident/fellow/NPs note as stated above.    Armin Carroll MD    04/04/2024

## 2024-04-06 LAB — VIT B12 SERPL-MCNC: 299 NG/L (ref 180–914)

## 2024-04-09 LAB — PYRIDOXAL SERPL-MCNC: 17 UG/L (ref 5–50)

## 2024-04-10 LAB — METHYLMALONATE SERPL-SCNC: 0.19 NMOL/ML

## 2024-04-22 ENCOUNTER — TELEPHONE (OUTPATIENT)
Dept: NEUROLOGY | Facility: CLINIC | Age: 64
End: 2024-04-22
Payer: COMMERCIAL

## 2024-04-22 ENCOUNTER — OFFICE VISIT (OUTPATIENT)
Dept: SPINE | Facility: CLINIC | Age: 64
End: 2024-04-22
Attending: ANESTHESIOLOGY
Payer: COMMERCIAL

## 2024-04-22 ENCOUNTER — TELEPHONE (OUTPATIENT)
Dept: SPINE | Facility: CLINIC | Age: 64
End: 2024-04-22
Payer: COMMERCIAL

## 2024-04-22 VITALS
HEART RATE: 63 BPM | WEIGHT: 240.31 LBS | SYSTOLIC BLOOD PRESSURE: 120 MMHG | BODY MASS INDEX: 28.37 KG/M2 | DIASTOLIC BLOOD PRESSURE: 67 MMHG | HEIGHT: 77 IN | OXYGEN SATURATION: 100 % | RESPIRATION RATE: 18 BRPM

## 2024-04-22 DIAGNOSIS — M54.51 VERTEBROGENIC LOW BACK PAIN: ICD-10-CM

## 2024-04-22 DIAGNOSIS — M51.36 DEGENERATIVE DISC DISEASE, LUMBAR: ICD-10-CM

## 2024-04-22 DIAGNOSIS — M54.16 LUMBAR RADICULOPATHY: Primary | ICD-10-CM

## 2024-04-22 PROCEDURE — 99999 PR PBB SHADOW E&M-EST. PATIENT-LVL III: CPT | Mod: PBBFAC,,, | Performed by: ANESTHESIOLOGY

## 2024-04-22 PROCEDURE — 3008F BODY MASS INDEX DOCD: CPT | Mod: CPTII,S$GLB,, | Performed by: ANESTHESIOLOGY

## 2024-04-22 PROCEDURE — 3074F SYST BP LT 130 MM HG: CPT | Mod: CPTII,S$GLB,, | Performed by: ANESTHESIOLOGY

## 2024-04-22 PROCEDURE — 3078F DIAST BP <80 MM HG: CPT | Mod: CPTII,S$GLB,, | Performed by: ANESTHESIOLOGY

## 2024-04-22 PROCEDURE — 99214 OFFICE O/P EST MOD 30 MIN: CPT | Mod: S$GLB,,, | Performed by: ANESTHESIOLOGY

## 2024-04-22 PROCEDURE — 3044F HG A1C LEVEL LT 7.0%: CPT | Mod: CPTII,S$GLB,, | Performed by: ANESTHESIOLOGY

## 2024-04-22 RX ORDER — CELECOXIB 100 MG/1
100 CAPSULE ORAL 2 TIMES DAILY
Qty: 60 CAPSULE | Refills: 2 | Status: SHIPPED | OUTPATIENT
Start: 2024-04-22 | End: 2024-07-21

## 2024-04-22 NOTE — TELEPHONE ENCOUNTER
----- Message from Sima Infante sent at 4/22/2024  3:37 PM CDT -----  Regarding: appt  Type:  Patient Returning Call      Name of who is calling:Dedrick        What is request in detail:Dedrick is requesting a calll back, needs to schedule an EMG for patient        Can clinic reply by [facility]:no         What number to call back if not in [facility]:

## 2024-04-23 NOTE — PROGRESS NOTES
Chronic patient Established Note (Follow up visit)      SUBJECTIVE:    Interval History 4/23/24:  Patient returns to clinic today for follow-up of low back pain radiating into the bilateral legs.  At his last visit, we recommended a statin holiday to see if this helped with a component of his pain, and he reports that over the past week, his symptoms have considerably improved.  That said, he continues to have intermittent radiating pains into bilateral legs with associated numbness and tingling.  He denies any focal weakness, bowel/bladder dysfunction, or saddle anesthesia.  We referred him for an EMG at his last visit, but this has not been scheduled yet.    Interval History 4/4/2024:  Valentin Howell presents to the clinic for a follow-up appointment for low back pain (R>L). Since the last visit, Valentin Howell states the pain has been worsening. Patient endorses sharp, stabbing, shooting pain that radiates to his bilateral legs (R>L) with associated numbness, tingling, and weakness. Pain is exacerbated by activity, lifting, bending, climbing up stairs. Pain is improved with rest, heat, medications. Current medications Tylenol, lidocaine patches, Voltaren. Current pain intensity is 3/10. Worst pain with activity 10/10. Patient has completed [facility] with reported increased strength, however, associated increased pain for the two days following each session. He continues with HEP. Patient denies red flags including weakness, unexpected weight loss/gain, night sweats/fevers, saddle anesthesia, and symptoms of CATY.    Interval History (8/14/23):  Valentin Howell presents tele-medicine appointment for a follow-up appointment for low back pain. Since the last visit, Valentin Howell states the pain has been persistant. Patient continues to endorse low back pain with radicular symptoms (R>L) that extends from his low back down the posterior lateral aspects of is legs to his calves bilaterally. Patient  reports significant pain over the weekend after driving to [place] with difficulty walking after the drive. His son was able to get him a patch (patient unsure what kind of patch, but sounds like lidocaine patch) for the ride home which significantly improved his pain and he is doing well at this time. Current pain intensity is 4/10. Patient denies red flags including weakness, unexpected weight loss/gain, night sweats/fevers, saddle anesthesia, and symptoms of CATY.        Original HISTORY OF PRESENT ILLNESS (3/13/23):   Valentin Howell presents to the clinic for the evaluation of the above pain. The pain started over 10 years ago.     Original Pain Description:  The pain is located in the low back and does not radiate. It is associated with numbness of the right lateral thigh and numbness of the left lateral calf. He states both of these come on suddenly while standing for longer than 1-2 hours but do not cause any pain. The back pain is described as aching and dull. Exacerbating factors: Standing, Bending, Extension, Flexing, Lifting, and Getting out of bed/chair. Mitigating factors laying down, medications (voltaren gel), and rest. Symptoms interfere with daily activity, sleeping, and work. The patient feels like symptoms have been worsening. Patient denies night fever/night sweats, urinary incontinence, bowel incontinence, and significant motor weakness. This is affecting his daily life as he has trouble standing for long durations at work.     Original PAIN SCORES:  Best: Pain is 2  Worst: Pain is 8  Current: Pain is 4     6 weeks of Conservative therapy:  PT: December 2021 - January 2022   Chiro: none  HEP: none currently, states that most recent physical therapy did not help symptoms     Treatments / Medications: (Ice/Heat/NSAIDS/APAP/etc):  Voltaren gel  Lidocaine patches  Tylenol     Interventional Pain Procedures: (Previous injections)  None      report:  Not applicable    Allergies: Review of patient's  allergies indicates:  No Known Allergies    Current Medications:   Current Outpatient Medications   Medication Sig Dispense Refill    aspirin 81 MG Chew Take 1 tablet (81 mg total) by mouth once daily. 90 tablet 3    famotidine (PEPCID) 10 MG tablet Take 1 tablet (10 mg total) by mouth 2 (two) times daily as needed. 90 tablet 3    LIDOcaine (LIDODERM) 5 % Place 1 patch onto the skin once daily. Remove & Discard patch within 12 hours or as directed by MD 30 patch 2    metoprolol succinate (TOPROL-XL) 25 MG 24 hr tablet Take 1 tablet (25 mg total) by mouth once daily. 90 tablet 3    nitroGLYCERIN (NITROSTAT) 0.4 MG SL tablet Place 1 tablet (0.4 mg total) under the tongue every 5 (five) minutes as needed for Chest pain. 15 tablet 3    omeprazole (PRILOSEC) 40 MG capsule Take 40 mg by mouth 2 (two) times a day.      rosuvastatin (CRESTOR) 20 MG tablet Take 1 tablet (20 mg total) by mouth every evening. 90 tablet 3    sertraline (ZOLOFT) 25 MG tablet Take 25 mg by mouth once daily.      tamsulosin (FLOMAX) 0.4 mg Cap Take 1 capsule (0.4 mg total) by mouth once daily. 90 capsule 2    celecoxib (CELEBREX) 100 MG capsule Take 1 capsule (100 mg total) by mouth 2 (two) times daily. 60 capsule 2     No current facility-administered medications for this visit.       REVIEW OF SYSTEMS:    GENERAL:  No weight loss, malaise or fevers.  HEENT:  Negative for frequent or significant headaches.  NECK:  Negative for lumps, goiter, pain and significant neck swelling.  RESPIRATORY:  Negative for cough, wheezing or shortness of breath.  CARDIOVASCULAR:  Negative for chest pain, leg swelling or palpitations.  GI:  Negative for abdominal discomfort, blood in stools or black stools or change in bowel habits.  MUSCULOSKELETAL:  See HPI.  SKIN:  Negative for lesions, rash, and itching.  PSYCH:  Negative for sleep disturbance, mood disorder and recent psychosocial stressors.  HEMATOLOGY/LYMPHOLOGY:  Negative for prolonged bleeding, bruising  easily or swollen nodes.  NEURO:   No history of headaches, syncope, paralysis, seizures or tremors.  All other reviewed and negative other than HPI.    Past Medical History:  Past Medical History:   Diagnosis Date    Acid reflux     Hypercholesteremia     SVT (supraventricular tachycardia)        Past Surgical History:  Past Surgical History:   Procedure Laterality Date    ABDOMINAL HERNIA REPAIR      HEEL SPUR SURGERY Right     HERNIA REPAIR Bilateral     TONSILLECTOMY         Family History:  Family History   Problem Relation Name Age of Onset    Hyperlipidemia Father Jarrett        Social History:  Social History     Socioeconomic History    Marital status:    Tobacco Use    Smoking status: Never    Smokeless tobacco: Never   Substance and Sexual Activity    Alcohol use: Not Currently     Alcohol/week: 0.0 standard drinks of alcohol    Drug use: No    Sexual activity: Yes     Partners: Female     Social Determinants of Health     Financial Resource Strain: Low Risk  (8/6/2023)    Overall Financial Resource Strain (CARDIA)     Difficulty of Paying Living Expenses: Not hard at all   Food Insecurity: No Food Insecurity (8/6/2023)    Hunger Vital Sign     Worried About Running Out of Food in the Last Year: Never true     Ran Out of Food in the Last Year: Never true   Transportation Needs: No Transportation Needs (8/6/2023)    PRAPARE - Transportation     Lack of Transportation (Medical): No     Lack of Transportation (Non-Medical): No   Physical Activity: Insufficiently Active (8/6/2023)    Exercise Vital Sign     Days of Exercise per Week: 3 days     Minutes of Exercise per Session: 30 min   Stress: No Stress Concern Present (8/6/2023)    Citizen of [place] of Occupational Health - Occupational Stress Questionnaire     Feeling of Stress : Only a little   Social Connections: Moderately Integrated (8/6/2023)    Social Connection and Isolation Panel [NHANES]     Frequency of Communication with Friends and Family:  "Three times a week     Frequency of Social Gatherings with Friends and Family: Three times a week     Attends Rastafari Services: 1 to 4 times per year     Active Member of Clubs or Organizations: No     Attends Club or Organization Meetings: Never     Marital Status: Living with partner   Housing Stability: Low Risk  (8/6/2023)    Housing Stability Vital Sign     Unable to Pay for Housing in the Last Year: No     Number of Places Lived in the Last Year: 1     Unstable Housing in the Last Year: No       OBJECTIVE:    /67 (BP Location: Right arm, Patient Position: Sitting, BP Method: Medium (Automatic))   Pulse 63   Resp 18   Ht 6' 5" (1.956 m)   Wt 109 kg (240 lb 4.8 oz)   SpO2 100%   BMI 28.50 kg/m²     PHYSICAL EXAM:     GENERAL: Alert and oriented x 3, no acute distress  PSYCH:  Mood and affect appropriate.  SKIN: Skin color, texture, turgor normal, no rashes or lesions.  HEENT:  Normocephalic, atraumatic. Cranial nerves grossly intact.  PULM: Non-labored breathing, symmetric chest rise.  BACK: Normal range of motion. There is some tenderness to palpation over the lower lumbar spine and paraspinal muscles. Straight leg raising is positive to radicular pain bilaterally.   EXTREMITIES: No deformities, edema, or skin discoloration.   MUSCULOSKELETAL: TTP over the right GTB. Bilateral upper and lower extremity strength is normal and symmetric. No atrophy is noted.  NEURO: Sensation is equal and appropriate bilaterally. Bilateral upper and lower extremity coordination and muscle stretch reflexes are physiologic and symmetric. No clonus. Absent James.    GAIT: Normal.    ASSESSMENT: 63 y.o. year old male with low back pain, consistent with:     1. Lumbar radiculopathy        2. Degenerative disc disease, lumbar        3. Vertebrogenic low back pain          PLAN:     - Previous records and imaging reviewed  - I have stressed the importance of physical activity and a home exercise plan to help with pain " and improve health.  - Continue HEP  - Patient can continue with medications for now since they are providing benefits, using them appropriately, and without side effects.  - Continue to hold statin as this seems to be helping at least some component of his leg pain  - Could consider epidurals in the future, but will await EMG results  - RTC after EMG to discuss results and possible interventions  - Counseled patient regarding the importance of activity modification, constant sleeping habits, and physical therapy.      The above plan and management options were discussed at length with patient. Patient is in agreement with the above and verbalized understanding.    Lisandro Marroquin MD  [facility]   I have personally reviewed the history and exam of this patient and agree with the resident/fellow/NPs note as stated above.    Armin Carroll MD  4/22/24

## 2024-05-28 ENCOUNTER — PATIENT MESSAGE (OUTPATIENT)
Dept: INTERNAL MEDICINE | Facility: CLINIC | Age: 64
End: 2024-05-28
Payer: COMMERCIAL

## 2024-05-29 ENCOUNTER — TELEPHONE (OUTPATIENT)
Dept: INTERNAL MEDICINE | Facility: CLINIC | Age: 64
End: 2024-05-29
Payer: COMMERCIAL

## 2024-05-29 NOTE — TELEPHONE ENCOUNTER
Returned patients call, no answer. Left voicemail message to call back and Dr. Mcnally is out of the office until June 10th. If he would like to see another provider in the office please ask the person answering the call to help him schedule an appointment with another provider. Otherwise we can continue trying to reach him on the phone.

## 2024-05-29 NOTE — TELEPHONE ENCOUNTER
Spoke to patient he was visiting his daughter in [place] who is a physician she noticed a black spot on his head and what's him to get it checked. Dr. Mcnally is out of the office patient will come in today to see Dr. Sharp

## 2024-05-29 NOTE — TELEPHONE ENCOUNTER
----- Message from Abbie Guo LPN sent at 5/28/2024  4:51 PM CDT -----    ----- Message -----  From: Elroy Chong  Sent: 5/28/2024   4:29 PM CDT  To: Dali Singh Staff    Type:  Patient Returning Call    Who Called: Self    Who Left Message for Patient: Jacklyn Byrd,    Does the patient know what this is regarding?:Yes    Would the patient rather a call back or a response via [facility]? Call    Best Call Back Number:[phone] (home)       Additional Information:

## 2024-05-29 NOTE — TELEPHONE ENCOUNTER
----- Message from Elroy Chong sent at 5/28/2024  4:28 PM CDT -----  Type:  Patient Returning Call    Who Called: Self    Who Left Message for Patient: Jacklyn Byrd,    Does the patient know what this is regarding?:Yes    Would the patient rather a call back or a response via [facility]? Call    Best Call Back Number:[phone] (home)       Additional Information:

## 2024-05-29 NOTE — TELEPHONE ENCOUNTER
----- Message from Nicolejose Davenport sent at 5/29/2024  8:40 AM CDT -----  Regarding: Returning call  Type:  Patient Returning Call    Who Called: p/t     Who Left Message for Patient: Jacklyn     Does the patient know what this is regarding?: scheduling appt     Would the patient rather a call back or a response via [facility]? Call back     Best Call Back Number: [phone] (home)       Additional Information:

## 2024-05-31 ENCOUNTER — OFFICE VISIT (OUTPATIENT)
Dept: INTERNAL MEDICINE | Facility: CLINIC | Age: 64
End: 2024-05-31
Attending: FAMILY MEDICINE
Payer: COMMERCIAL

## 2024-05-31 VITALS
SYSTOLIC BLOOD PRESSURE: 119 MMHG | OXYGEN SATURATION: 96 % | HEIGHT: 77 IN | WEIGHT: 234.56 LBS | BODY MASS INDEX: 27.69 KG/M2 | HEART RATE: 60 BPM | DIASTOLIC BLOOD PRESSURE: 78 MMHG

## 2024-05-31 DIAGNOSIS — H53.69 DIMINISHED NIGHT VISION: ICD-10-CM

## 2024-05-31 DIAGNOSIS — L98.9 SKIN LESION OF SCALP: Primary | ICD-10-CM

## 2024-05-31 DIAGNOSIS — H91.90 HEARING LOSS, UNSPECIFIED HEARING LOSS TYPE, UNSPECIFIED LATERALITY: ICD-10-CM

## 2024-05-31 PROCEDURE — 3008F BODY MASS INDEX DOCD: CPT | Mod: CPTII,S$GLB,, | Performed by: STUDENT IN AN ORGANIZED HEALTH CARE EDUCATION/TRAINING PROGRAM

## 2024-05-31 PROCEDURE — 99213 OFFICE O/P EST LOW 20 MIN: CPT | Mod: S$GLB,,, | Performed by: STUDENT IN AN ORGANIZED HEALTH CARE EDUCATION/TRAINING PROGRAM

## 2024-05-31 PROCEDURE — 3078F DIAST BP <80 MM HG: CPT | Mod: CPTII,S$GLB,, | Performed by: STUDENT IN AN ORGANIZED HEALTH CARE EDUCATION/TRAINING PROGRAM

## 2024-05-31 PROCEDURE — 3044F HG A1C LEVEL LT 7.0%: CPT | Mod: CPTII,S$GLB,, | Performed by: STUDENT IN AN ORGANIZED HEALTH CARE EDUCATION/TRAINING PROGRAM

## 2024-05-31 PROCEDURE — 99999 PR PBB SHADOW E&M-EST. PATIENT-LVL IV: CPT | Mod: PBBFAC,,, | Performed by: STUDENT IN AN ORGANIZED HEALTH CARE EDUCATION/TRAINING PROGRAM

## 2024-05-31 PROCEDURE — 1159F MED LIST DOCD IN RCRD: CPT | Mod: CPTII,S$GLB,, | Performed by: STUDENT IN AN ORGANIZED HEALTH CARE EDUCATION/TRAINING PROGRAM

## 2024-05-31 PROCEDURE — 3074F SYST BP LT 130 MM HG: CPT | Mod: CPTII,S$GLB,, | Performed by: STUDENT IN AN ORGANIZED HEALTH CARE EDUCATION/TRAINING PROGRAM

## 2024-05-31 NOTE — PROGRESS NOTES
"  [facility]  Subjective:     Patient ID: Valentin Howell is a 63 y.o. male.  Chief Complaint:  Lesion of scalp, hearing loss, night vision problems  HPI:  Patient is a 63 y.o. male who presents for the above chief complaint.     This was noticed by his son who is a physician.  Advised him to seek care with a dermatologist as soon as possible.     Reports some blurry vision when driving at night.    Reports some hearing loss.  He was unable to follow a conversation in a noisy room.  He was established with ENT and audiology for schwannoma and presbycusis in 2022.  Documentation at that time stated would need 2 year follow up. He is due for this.     Current Outpatient Medications   Medication Instructions    aspirin 81 mg, Oral, Daily    celecoxib (CELEBREX) 100 mg, Oral, 2 times daily    famotidine (PEPCID) 10 mg, Oral, 2 times daily PRN    LIDOcaine (LIDODERM) 5 % 1 patch, Transdermal, Daily, Remove & Discard patch within 12 hours or as directed by MD    metoprolol succinate (TOPROL-XL) 25 mg, Oral, Daily    nitroGLYCERIN (NITROSTAT) 0.4 mg, Sublingual, Every 5 min PRN    omeprazole (PRILOSEC) 40 mg, Oral, 2 times daily    rosuvastatin (CRESTOR) 20 mg, Oral, Nightly    sertraline (ZOLOFT) 25 mg, Daily    tamsulosin (FLOMAX) 0.4 mg, Oral, Daily     Objective:      Body mass index is 27.82 kg/m².  Vitals:    05/31/24 1004   BP: 119/78   Pulse: 60   SpO2: 96%   Weight: 106.4 kg (234 lb 9.1 oz)   Height: 6' 5" (1.956 m)   PainSc: 0-No pain     Physical Exam  Constitutional:       Appearance: Normal appearance.   Eyes:      Comments: B/L cataracts    Cardiovascular:      Rate and Rhythm: Normal rate.      Heart sounds: No murmur heard.  Pulmonary:      Effort: Pulmonary effort is normal. No respiratory distress.   Abdominal:      General: Abdomen is flat.      Palpations: Abdomen is soft.   Skin:     General: Skin is warm and dry.   Neurological:      Mental Status: He is alert.       "     Assessment:       1. Skin lesion of scalp    2. Diminished night vision    3. Hearing loss, unspecified hearing loss type, unspecified laterality        Plan:   Placed urgent referral to Derm as lesion is concerning for melanoma. Discussed with patient.     Refer to Optho for vision problems likely due to cataracts.     Messaged otolaryngology staff pool to facilitate scheduling of follow up.   Skin lesion of scalp  -     Ambulatory referral/consult to Dermatology; Future; Expected date: 06/07/2024    Diminished night vision  -     Ambulatory referral/consult to Ophthalmology; Future; Expected date: 06/07/2024    Hearing loss, unspecified hearing loss type, unspecified laterality        All of your core healthy metrics are met.    No follow-ups on file. or sooner prn (as needed)          Oskar Nunez  [facility]  [facility] [place] [address]  Phone [phone]  Fax [phone]    This note is dictated using the M*Modal Fluency Direct word recognition program. It may contain word recognition mistakes or wrong word substitutions (commonly he/she and is/was substitutions) that were missed on review.    I spent a total of 25 minutes on the day of the visit.  This includes face to face time and non-face to face time preparing to see the patient (eg, review of tests), obtaining and/or reviewing separately obtained history, documenting clinical information in the electronic or other health record, independently interpreting results and communicating results to the patient/family/caregiver, or care coordinator.

## 2024-06-03 ENCOUNTER — OFFICE VISIT (OUTPATIENT)
Dept: OPTOMETRY | Facility: CLINIC | Age: 64
End: 2024-06-03
Payer: COMMERCIAL

## 2024-06-03 DIAGNOSIS — H52.03 HYPEROPIA, BILATERAL: ICD-10-CM

## 2024-06-03 DIAGNOSIS — H53.69 DIMINISHED NIGHT VISION: ICD-10-CM

## 2024-06-03 DIAGNOSIS — H25.13 NS (NUCLEAR SCLEROSIS), BILATERAL: Primary | ICD-10-CM

## 2024-06-03 DIAGNOSIS — H52.4 PRESBYOPIA: ICD-10-CM

## 2024-06-03 PROCEDURE — 99999 PR PBB SHADOW E&M-EST. PATIENT-LVL III: CPT | Mod: PBBFAC,,, | Performed by: OPTOMETRIST

## 2024-06-03 PROCEDURE — 92015 DETERMINE REFRACTIVE STATE: CPT | Mod: S$GLB,,, | Performed by: OPTOMETRIST

## 2024-06-03 PROCEDURE — 1159F MED LIST DOCD IN RCRD: CPT | Mod: CPTII,S$GLB,, | Performed by: OPTOMETRIST

## 2024-06-03 PROCEDURE — 3044F HG A1C LEVEL LT 7.0%: CPT | Mod: CPTII,S$GLB,, | Performed by: OPTOMETRIST

## 2024-06-03 PROCEDURE — 92004 COMPRE OPH EXAM NEW PT 1/>: CPT | Mod: S$GLB,,, | Performed by: OPTOMETRIST

## 2024-06-03 NOTE — PROGRESS NOTES
HPI    CHINO: none    CC: Pt is here today for a routine eye exam. Pt states that over the last   10yrs his night vision has be diminishing. Pt states that everything is   foggy and he has difficulty with driving at night. Pt states that it is   difficult for him to read under dim lighting even with his readers on.     (-)Dryness  (-)Burning  (+)Itchiness  (+)Tearing  (-)Flashes  (-)Floaters   (+)Photophobia  (-)Eye Pain      Diabetic: no    Contact Lens: no    Eye Meds: no    PD: 68.5    Last edited by Raven Coyne MA on 6/3/2024 10:37 AM.            Assessment /Plan     For exam results, see Encounter Report.    NS (nuclear sclerosis), bilateral   Mild, monitor    Diminished night vision  -     Ambulatory referral/consult to Ophthalmology    Presbyopia  Hyperopia, bilateral   Rx specs       RTC 1 year, sooner PRN

## 2024-06-04 ENCOUNTER — PATIENT MESSAGE (OUTPATIENT)
Dept: INTERNAL MEDICINE | Facility: CLINIC | Age: 64
End: 2024-06-04
Payer: COMMERCIAL

## 2024-07-29 ENCOUNTER — OFFICE VISIT (OUTPATIENT)
Dept: OTOLARYNGOLOGY | Facility: CLINIC | Age: 64
End: 2024-07-29
Payer: COMMERCIAL

## 2024-07-29 ENCOUNTER — CLINICAL SUPPORT (OUTPATIENT)
Dept: AUDIOLOGY | Facility: CLINIC | Age: 64
End: 2024-07-29
Payer: COMMERCIAL

## 2024-07-29 DIAGNOSIS — H90.A22 SENSORINEURAL HEARING LOSS (SNHL) OF LEFT EAR WITH RESTRICTED HEARING OF RIGHT EAR: ICD-10-CM

## 2024-07-29 DIAGNOSIS — H90.A22 SENSORINEURAL HEARING LOSS (SNHL) OF LEFT EAR WITH RESTRICTED HEARING OF RIGHT EAR: Primary | ICD-10-CM

## 2024-07-29 DIAGNOSIS — D33.3 ACOUSTIC NEUROMA: Primary | ICD-10-CM

## 2024-07-29 DIAGNOSIS — H91.8X3 ASYMMETRICAL HEARING LOSS: ICD-10-CM

## 2024-07-29 PROCEDURE — 92567 TYMPANOMETRY: CPT | Mod: S$GLB,,,

## 2024-07-29 PROCEDURE — 99214 OFFICE O/P EST MOD 30 MIN: CPT | Mod: S$GLB,,, | Performed by: OTOLARYNGOLOGY

## 2024-07-29 PROCEDURE — 99999 PR PBB SHADOW E&M-EST. PATIENT-LVL I: CPT | Mod: PBBFAC,,,

## 2024-07-29 PROCEDURE — 92557 COMPREHENSIVE HEARING TEST: CPT | Mod: S$GLB,,,

## 2024-07-29 PROCEDURE — 1159F MED LIST DOCD IN RCRD: CPT | Mod: CPTII,S$GLB,, | Performed by: OTOLARYNGOLOGY

## 2024-07-29 PROCEDURE — 3044F HG A1C LEVEL LT 7.0%: CPT | Mod: CPTII,S$GLB,, | Performed by: OTOLARYNGOLOGY

## 2024-07-29 PROCEDURE — 99999 PR PBB SHADOW E&M-EST. PATIENT-LVL III: CPT | Mod: PBBFAC,,, | Performed by: OTOLARYNGOLOGY

## 2024-07-29 NOTE — PROGRESS NOTES
Valentin Howell was seen today in the clinic for an audiologic evaluation.  Patient's main complaint was hearing loss.  Mr. Mikey Howell reported a history of a left sided acoustic neuroma. He denied any other otologic symptoms.     Tympanometry revealed Type A in the right ear and Type A in the left ear.     Audiogram results revealed normal sloping to moderately-severe sensorineural hearing loss in the right ear and a profound sensorineural hearing loss in the left ear.      Speech reception threshold was noted at 30 dB in the right ear.  Speech discrimination score was 100% in the right ear.  Speech audiometry could not be completed for the left ear due to meeting the limits of the audiometer.     Recommendations:  Otologic evaluation  Amplification consultation following medical clearance  Annual audiogram, or sooner if any changes in hearing are noted  Hearing protection when in noise

## 2024-07-29 NOTE — PROGRESS NOTES
Subjective     Patient ID: Valentin Howell is a 63 y.o. male.    Chief Complaint: Hearing Loss (Diminished hearing, right ear better than left )    HPI: Hx of left cochlear schwannoma.    Small.    Now with further HL AS.    Past Medical History: Patient has a past medical history of Acid reflux, Hypercholesteremia, and SVT (supraventricular tachycardia).    Past Surgical History: Patient has a past surgical history that includes Tonsillectomy; Abdominal hernia repair; Hernia repair (Bilateral); and Heel spur surgery (Right).    Social History: Patient reports that he has never smoked. He has never used smokeless tobacco. He reports that he does not currently use alcohol. He reports that he does not use drugs.    Family History: family history includes Hyperlipidemia in his father.    Medications:   Current Outpatient Medications   Medication Sig    aspirin 81 MG Chew Take 1 tablet (81 mg total) by mouth once daily.    famotidine (PEPCID) 10 MG tablet Take 1 tablet (10 mg total) by mouth 2 (two) times daily as needed.    LIDOcaine (LIDODERM) 5 % Place 1 patch onto the skin once daily. Remove & Discard patch within 12 hours or as directed by MD    metoprolol succinate (TOPROL-XL) 25 MG 24 hr tablet Take 1 tablet (25 mg total) by mouth once daily.    nitroGLYCERIN (NITROSTAT) 0.4 MG SL tablet Place 1 tablet (0.4 mg total) under the tongue every 5 (five) minutes as needed for Chest pain. (Patient not taking: Reported on 5/31/2024)    omeprazole (PRILOSEC) 40 MG capsule Take 40 mg by mouth 2 (two) times a day.    rosuvastatin (CRESTOR) 20 MG tablet Take 1 tablet (20 mg total) by mouth every evening.    sertraline (ZOLOFT) 25 MG tablet Take 25 mg by mouth once daily. (Patient not taking: Reported on 5/31/2024)    tamsulosin (FLOMAX) 0.4 mg Cap Take 1 capsule (0.4 mg total) by mouth once daily.     No current facility-administered medications for this visit.       Allergies: Patient has No Known Allergies.    Review of  Systems   HENT:  Positive for hearing loss.           Objective     Physical Exam  Constitutional:       General: He is not in acute distress.     Appearance: He is well-developed. He is not diaphoretic.   HENT:      Head: Normocephalic and atraumatic.      Right Ear: Decreased hearing noted. No laceration, drainage, swelling or tenderness. No middle ear effusion. No foreign body. No mastoid tenderness. No hemotympanum. Tympanic membrane is not injected, scarred, perforated, erythematous, retracted or bulging. Tympanic membrane has normal mobility.      Left Ear: Decreased hearing noted. No laceration, drainage, swelling or tenderness.  No middle ear effusion. No foreign body. No mastoid tenderness. No hemotympanum. Tympanic membrane is not injected, scarred, perforated, erythematous, retracted or bulging. Tympanic membrane has normal mobility.      Mouth/Throat:      Pharynx: No oropharyngeal exudate.   Eyes:      Extraocular Movements:      Right eye: Normal extraocular motion and no nystagmus.      Left eye: Normal extraocular motion and no nystagmus.      Pupils: Pupils are equal, round, and reactive to light.   Musculoskeletal:      Cervical back: Normal range of motion.   Neurological:      Mental Status: He is alert.      Cranial Nerves: No cranial nerve deficit.      Sensory: No sensory deficit.      Motor: No tremor, atrophy, abnormal muscle tone or seizure activity.      Coordination: Coordination normal.      Gait: Gait normal.            Assessment and Plan     1. Acoustic neuroma  -     Creatinine, Serum; Future; Expected date: 07/29/2024    2. Asymmetrical hearing loss    3. Sensorineural hearing loss (SNHL) of left ear with restricted hearing of right ear  -     MRI IAC/Temporal Bones W W/O Contrast; Future; Expected date: 07/29/2024         Discussed CROS/ declined.    F/U 2 yrs if no change.         No follow-ups on file.

## 2024-08-14 ENCOUNTER — PATIENT MESSAGE (OUTPATIENT)
Dept: OTOLARYNGOLOGY | Facility: CLINIC | Age: 64
End: 2024-08-14
Payer: COMMERCIAL

## 2024-08-14 ENCOUNTER — HOSPITAL ENCOUNTER (OUTPATIENT)
Dept: RADIOLOGY | Facility: HOSPITAL | Age: 64
Discharge: HOME | End: 2024-08-14
Attending: OTOLARYNGOLOGY
Payer: COMMERCIAL

## 2024-08-14 DIAGNOSIS — H90.A22 SENSORINEURAL HEARING LOSS (SNHL) OF LEFT EAR WITH RESTRICTED HEARING OF RIGHT EAR: ICD-10-CM

## 2024-08-14 PROCEDURE — 70553 MRI BRAIN STEM W/O & W/DYE: CPT | Mod: TC

## 2024-08-14 PROCEDURE — A9585 GADOBUTROL INJECTION: HCPCS | Performed by: OTOLARYNGOLOGY

## 2024-08-14 PROCEDURE — 70553 MRI BRAIN STEM W/O & W/DYE: CPT | Mod: 26,,, | Performed by: RADIOLOGY

## 2024-08-14 PROCEDURE — 25500020 PHARM REV CODE 255: Performed by: OTOLARYNGOLOGY

## 2024-08-14 RX ORDER — GADOBUTROL 604.72 MG/ML
10 INJECTION INTRAVENOUS
Status: COMPLETED | OUTPATIENT
Start: 2024-08-14 | End: 2024-08-14

## 2024-08-14 RX ADMIN — GADOBUTROL 10 ML: 604.72 INJECTION INTRAVENOUS at 06:08

## 2024-10-08 ENCOUNTER — PATIENT MESSAGE (OUTPATIENT)
Dept: INTERNAL MEDICINE | Facility: CLINIC | Age: 64
End: 2024-10-08
Payer: COMMERCIAL

## 2024-10-08 NOTE — TELEPHONE ENCOUNTER
No care due was identified.  [facility] Embedded Care Due Messages. Reference number: [id number].   10/08/2024 1:42:55 PM CDT

## 2024-10-08 NOTE — TELEPHONE ENCOUNTER
Pt message to provider:   Christophe Mcnally, hope all is well.  I was looking to refill the Cialis 10 mg but it is removed from my medications list!  Best Regards,  Valentin MENDOZA 5/31/2024 Allergies confirmed. Pt requesting refill.Pended medicaion

## 2024-10-09 RX ORDER — TADALAFIL 10 MG/1
10 TABLET ORAL DAILY PRN
Qty: 15 TABLET | Refills: 1 | Status: SHIPPED | OUTPATIENT
Start: 2024-10-09 | End: 2024-11-09

## 2025-02-12 DIAGNOSIS — I47.10 SVT (SUPRAVENTRICULAR TACHYCARDIA): ICD-10-CM

## 2025-02-17 RX ORDER — METOPROLOL TARTRATE 25 MG/1
TABLET, FILM COATED ORAL
Qty: 60 TABLET | Refills: 6 | OUTPATIENT
Start: 2025-02-17

## 2025-02-19 DIAGNOSIS — I47.10 SVT (SUPRAVENTRICULAR TACHYCARDIA): Primary | ICD-10-CM

## 2025-03-19 ENCOUNTER — TELEPHONE (OUTPATIENT)
Dept: ADMINISTRATIVE | Facility: OTHER | Age: 65
End: 2025-03-19
Payer: COMMERCIAL

## 2025-03-19 ENCOUNTER — OFFICE VISIT (OUTPATIENT)
Dept: INTERNAL MEDICINE | Facility: CLINIC | Age: 65
End: 2025-03-19
Attending: INTERNAL MEDICINE
Payer: COMMERCIAL

## 2025-03-19 ENCOUNTER — LAB VISIT (OUTPATIENT)
Dept: LAB | Facility: OTHER | Age: 65
End: 2025-03-19
Attending: INTERNAL MEDICINE
Payer: COMMERCIAL

## 2025-03-19 VITALS
OXYGEN SATURATION: 96 % | HEART RATE: 63 BPM | BODY MASS INDEX: 28.14 KG/M2 | SYSTOLIC BLOOD PRESSURE: 118 MMHG | WEIGHT: 238.31 LBS | HEIGHT: 77 IN | DIASTOLIC BLOOD PRESSURE: 78 MMHG

## 2025-03-19 DIAGNOSIS — Z00.00 ANNUAL PHYSICAL EXAM: Primary | ICD-10-CM

## 2025-03-19 DIAGNOSIS — R35.0 URINARY FREQUENCY: ICD-10-CM

## 2025-03-19 DIAGNOSIS — I47.10 SVT (SUPRAVENTRICULAR TACHYCARDIA): ICD-10-CM

## 2025-03-19 DIAGNOSIS — E78.2 MIXED HYPERLIPIDEMIA: ICD-10-CM

## 2025-03-19 DIAGNOSIS — Z12.5 PROSTATE CANCER SCREENING: ICD-10-CM

## 2025-03-19 DIAGNOSIS — N52.9 ERECTILE DYSFUNCTION, UNSPECIFIED ERECTILE DYSFUNCTION TYPE: ICD-10-CM

## 2025-03-19 DIAGNOSIS — M54.9 BACK PAIN, UNSPECIFIED BACK LOCATION, UNSPECIFIED BACK PAIN LATERALITY, UNSPECIFIED CHRONICITY: ICD-10-CM

## 2025-03-19 DIAGNOSIS — D33.3 LEFT-SIDED ACOUSTIC NEUROMA: ICD-10-CM

## 2025-03-19 DIAGNOSIS — R79.89 LOW VITAMIN B12 LEVEL: ICD-10-CM

## 2025-03-19 DIAGNOSIS — Z00.00 ANNUAL PHYSICAL EXAM: ICD-10-CM

## 2025-03-19 DIAGNOSIS — D33.3 ACOUSTIC NEUROMA: ICD-10-CM

## 2025-03-19 LAB
ALBUMIN SERPL BCP-MCNC: 4.3 G/DL (ref 3.5–5.2)
ALP SERPL-CCNC: 65 U/L (ref 40–150)
ALT SERPL W/O P-5'-P-CCNC: 26 U/L (ref 10–44)
ANION GAP SERPL CALC-SCNC: 9 MMOL/L (ref 8–16)
AST SERPL-CCNC: 27 U/L (ref 10–40)
BASOPHILS # BLD AUTO: 0.04 K/UL (ref 0–0.2)
BASOPHILS NFR BLD: 0.5 % (ref 0–1.9)
BILIRUB SERPL-MCNC: 0.4 MG/DL (ref 0.1–1)
BILIRUB UR QL STRIP: NEGATIVE
BUN SERPL-MCNC: 11 MG/DL (ref 8–23)
CALCIUM SERPL-MCNC: 9.8 MG/DL (ref 8.7–10.5)
CHLORIDE SERPL-SCNC: 106 MMOL/L (ref 95–110)
CHOLEST SERPL-MCNC: 183 MG/DL (ref 120–199)
CHOLEST/HDLC SERPL: 3.5 {RATIO} (ref 2–5)
CLARITY UR: CLEAR
CO2 SERPL-SCNC: 26 MMOL/L (ref 23–29)
COLOR UR: YELLOW
COMPLEXED PSA SERPL-MCNC: 0.83 NG/ML (ref 0–4)
CREAT SERPL-MCNC: 0.9 MG/DL (ref 0.5–1.4)
DIFFERENTIAL METHOD BLD: NORMAL
EOSINOPHIL # BLD AUTO: 0.1 K/UL (ref 0–0.5)
EOSINOPHIL NFR BLD: 0.8 % (ref 0–8)
ERYTHROCYTE [DISTWIDTH] IN BLOOD BY AUTOMATED COUNT: 12.6 % (ref 11.5–14.5)
EST. GFR  (NO RACE VARIABLE): >60 ML/MIN/1.73 M^2
ESTIMATED AVG GLUCOSE: 105 MG/DL (ref 68–131)
GLUCOSE SERPL-MCNC: 90 MG/DL (ref 70–110)
GLUCOSE UR QL STRIP: NEGATIVE
HBA1C MFR BLD: 5.3 % (ref 4–5.6)
HCT VFR BLD AUTO: 47 % (ref 40–54)
HDLC SERPL-MCNC: 52 MG/DL (ref 40–75)
HDLC SERPL: 28.4 % (ref 20–50)
HGB BLD-MCNC: 15.3 G/DL (ref 14–18)
HGB UR QL STRIP: NEGATIVE
IMM GRANULOCYTES # BLD AUTO: 0.02 K/UL (ref 0–0.04)
IMM GRANULOCYTES NFR BLD AUTO: 0.3 % (ref 0–0.5)
KETONES UR QL STRIP: NEGATIVE
LDLC SERPL CALC-MCNC: 94 MG/DL (ref 63–159)
LEUKOCYTE ESTERASE UR QL STRIP: NEGATIVE
LYMPHOCYTES # BLD AUTO: 2.2 K/UL (ref 1–4.8)
LYMPHOCYTES NFR BLD: 30.4 % (ref 18–48)
MCH RBC QN AUTO: 29.4 PG (ref 27–31)
MCHC RBC AUTO-ENTMCNC: 32.6 G/DL (ref 32–36)
MCV RBC AUTO: 90 FL (ref 82–98)
MONOCYTES # BLD AUTO: 0.6 K/UL (ref 0.3–1)
MONOCYTES NFR BLD: 8.7 % (ref 4–15)
NEUTROPHILS # BLD AUTO: 4.4 K/UL (ref 1.8–7.7)
NEUTROPHILS NFR BLD: 59.3 % (ref 38–73)
NITRITE UR QL STRIP: NEGATIVE
NONHDLC SERPL-MCNC: 131 MG/DL
NRBC BLD-RTO: 0 /100 WBC
PH UR STRIP: 6 [PH] (ref 5–8)
PLATELET # BLD AUTO: 225 K/UL (ref 150–450)
PMV BLD AUTO: 11 FL (ref 9.2–12.9)
POTASSIUM SERPL-SCNC: 4.9 MMOL/L (ref 3.5–5.1)
PROT SERPL-MCNC: 7.9 G/DL (ref 6–8.4)
PROT UR QL STRIP: NEGATIVE
RBC # BLD AUTO: 5.2 M/UL (ref 4.6–6.2)
SODIUM SERPL-SCNC: 141 MMOL/L (ref 136–145)
SP GR UR STRIP: 1.02 (ref 1–1.03)
TRIGL SERPL-MCNC: 185 MG/DL (ref 30–150)
TSH SERPL DL<=0.005 MIU/L-ACNC: 0.84 UIU/ML (ref 0.4–4)
URN SPEC COLLECT METH UR: NORMAL
VIT B12 SERPL-MCNC: 451 PG/ML (ref 210–950)
WBC # BLD AUTO: 7.34 K/UL (ref 3.9–12.7)

## 2025-03-19 PROCEDURE — 85025 COMPLETE CBC W/AUTO DIFF WBC: CPT | Performed by: INTERNAL MEDICINE

## 2025-03-19 PROCEDURE — 80061 LIPID PANEL: CPT | Performed by: INTERNAL MEDICINE

## 2025-03-19 PROCEDURE — 99999 PR PBB SHADOW E&M-EST. PATIENT-LVL IV: CPT | Mod: PBBFAC,,, | Performed by: INTERNAL MEDICINE

## 2025-03-19 PROCEDURE — 83036 HEMOGLOBIN GLYCOSYLATED A1C: CPT | Performed by: INTERNAL MEDICINE

## 2025-03-19 PROCEDURE — 82607 VITAMIN B-12: CPT | Performed by: INTERNAL MEDICINE

## 2025-03-19 PROCEDURE — 80053 COMPREHEN METABOLIC PANEL: CPT | Performed by: INTERNAL MEDICINE

## 2025-03-19 PROCEDURE — 84153 ASSAY OF PSA TOTAL: CPT | Performed by: INTERNAL MEDICINE

## 2025-03-19 PROCEDURE — 84443 ASSAY THYROID STIM HORMONE: CPT | Performed by: INTERNAL MEDICINE

## 2025-03-19 PROCEDURE — 81003 URINALYSIS AUTO W/O SCOPE: CPT | Performed by: INTERNAL MEDICINE

## 2025-03-19 RX ORDER — METOPROLOL TARTRATE 25 MG/1
25 TABLET, FILM COATED ORAL
COMMUNITY
Start: 2025-02-19

## 2025-03-19 RX ORDER — PRAVASTATIN SODIUM 40 MG/1
40 TABLET ORAL
COMMUNITY

## 2025-03-19 RX ORDER — LIDOCAINE 50 MG/G
1 PATCH TOPICAL DAILY
Qty: 30 PATCH | Refills: 2 | Status: SHIPPED | OUTPATIENT
Start: 2025-03-19

## 2025-03-19 RX ORDER — TADALAFIL 10 MG/1
10 TABLET ORAL DAILY PRN
Qty: 15 TABLET | Refills: 1 | Status: SHIPPED | OUTPATIENT
Start: 2025-03-19 | End: 2025-04-19

## 2025-03-19 NOTE — PROGRESS NOTES
"Subjective:       Patient ID: Valentin Jensen Hajj is a 64 y.o. male.    Chief Complaint: Annual Exam, Palpitations, SHaky hands, and Urinary Frequency    Here for annual exam    Progressively worsening urinary frequency and urgency and nocturia.     MRI/IAC (08/2024 )Stable short-segment enhancement within the left ventral aspect basal turn of the cochlea remains concerning for possible cochlear schwannoma.      ### HLD ###  Lipitor 20mg   Pt is tolerating statin without frequent muscle cramps or diffuse myalgias or weakness.     ### Schwannoma ###  MRI brain c/ and s/ done for asymmetrical hearing loss:Focal enhancement within the basal turn of the left cochlea, suspicious for labyrinthitis or possible small intra-cochlear schwannoma.   Initially with some mild hearing loss. Vertigo episodes starting 2022  LC[facility] 3/21/23 ENT Dr Avilez writes "Discussed 2 year follow up with MRI. An at-home trial with [place] exercises  MRI brain due 03/2024        ### GERD ###  Long standing hx  GI Dr Bond  Last EGD:   3/2023 Reports symptoms well controlled     ### lumbar DJD ###  -Prio HPI: Low back pain for several years. Pain free for first few hours of the day but simply with ADLs develops low back pain, band across lumbar region, 8/10, non-radiating. Denies associated numbness/tingling of ext, weakness of LE, saddle anesthesia, or bowel/bladder incontinence. Also notes that pain is much worse when rising after sitting for an hour. He denies any alleviating factors. Takes occasional NSAID             History of Present Illness    CHIEF COMPLAINT:  Valentin presents today for follow up.    HEARING:  He reports significant difficulty with hearing, particularly in understanding conversations with multiple people present, comprehending only about half of what is said. An audiologist has recommended against a cochlear implant due to concerns about interfering with the existing condition. He has adapted by instructing friends to sit on " his right side during conversations.    GENITOURINARY:  He reports frequent urination, particularly at night when it is cold, urinating hourly. He typically urinates 4 times nightly and approximately ten times during the day. He denies difficulty with urination or emptying. These urinary symptoms have been gradually worsening over years. He has a 20-year history of erectile dysfunction specifically upon waking in the morning, though maintains ability to achieve spontaneous erections during sleep. He uses Tadalafil as needed for sexual performance enhancement, occasionally experiencing palpitations with use.    RECENT EVENTS:  He experienced a fall last week while climbing stairs due to fatigue.    MEDICATIONS:  He takes Metoprolol regularly and Tadalafil as needed.      ROS:  General: -fever, -chills, -fatigue, -weight gain, -weight loss  Eyes: -vision changes, -redness, -discharge  ENT: -ear pain, -nasal congestion, -sore throat, +hearing loss, +difficulty hearing  Cardiovascular: -chest pain, +palpitations, -lower extremity edema  Respiratory: -cough, -shortness of breath  Gastrointestinal: -abdominal pain, -nausea, -vomiting, -diarrhea, -constipation, -blood in stool  Genitourinary: -dysuria, -hematuria, +frequency  Musculoskeletal: -joint pain, -muscle pain  Skin: -rash, -lesion  Neurological: -headache, -dizziness, -numbness, -tingling  Psychiatric: -anxiety, -depression, -sleep difficulty  Male Genitourinary: +erectile dysfunction         Review of Systems   Constitutional:  Negative for appetite change, chills, fever and unexpected weight change.   HENT:  Negative for hearing loss, sore throat and trouble swallowing.    Eyes:  Negative for visual disturbance.   Respiratory:  Negative for cough, chest tightness and shortness of breath.    Cardiovascular:  Negative for chest pain and leg swelling.   Gastrointestinal:  Negative for abdominal pain, blood in stool, constipation, diarrhea, nausea and vomiting.  "  Endocrine: Negative for polydipsia and polyuria.   Genitourinary:  Negative for decreased urine volume, difficulty urinating, dysuria, frequency and urgency.   Musculoskeletal:  Negative for gait problem.   Skin:  Negative for rash.   Neurological:  Negative for dizziness and numbness.   Psychiatric/Behavioral:  The patient is not nervous/anxious.        Objective:      Vitals:    03/19/25 1043   BP: 118/78   BP Location: Left arm   Patient Position: Sitting   Pulse: 63   SpO2: 96%   Weight: 108.1 kg (238 lb 5.1 oz)   Height: 6' 5" (1.956 m)      Physical Exam  Vitals and nursing note reviewed.   Constitutional:       General: He is not in acute distress.     Appearance: Normal appearance. He is well-developed.   HENT:      Head: Normocephalic and atraumatic.      Mouth/Throat:      Pharynx: No oropharyngeal exudate.   Eyes:      General: No scleral icterus.     Conjunctiva/sclera: Conjunctivae normal.      Pupils: Pupils are equal, round, and reactive to light.   Neck:      Thyroid: No thyromegaly.   Cardiovascular:      Rate and Rhythm: Normal rate and regular rhythm.      Heart sounds: Normal heart sounds. No murmur heard.  Pulmonary:      Effort: Pulmonary effort is normal.      Breath sounds: Normal breath sounds. No wheezing or rales.   Abdominal:      General: There is no distension.   Musculoskeletal:         General: No tenderness.   Lymphadenopathy:      Cervical: No cervical adenopathy.   Skin:     General: Skin is warm and dry.   Neurological:      Mental Status: He is alert and oriented to person, place, and time.   Psychiatric:         Behavior: Behavior normal.         Assessment:       1. Annual physical exam    2. Back pain, unspecified back location, unspecified back pain laterality, unspecified chronicity    3. Prostate cancer screening    4. SVT (supraventricular tachycardia)    5. Acoustic neuroma    6. Low vitamin B12 level    7. Urinary frequency    8. Erectile dysfunction, unspecified " erectile dysfunction type    9. Left-sided acoustic neuroma    10. Mixed hyperlipidemia        Plan:       Valentin was seen today for annual exam, palpitations, shaky hands and urinary frequency.    Diagnoses and all orders for this visit:    Annual physical exam  -     Hemoglobin A1C; Future  -     CBC Auto Differential; Future  -     TSH; Future  -     Lipid Panel; Future  -     Comprehensive Metabolic Panel; Future    Back pain, unspecified back location, unspecified back pain laterality, unspecified chronicity  -     LIDOcaine (LIDODERM) 5 %; Place 1 patch onto the skin once daily. Remove & Discard patch within 12 hours or as directed by MD    Prostate cancer screening  -     PSA, Screening; Future    SVT (supraventricular tachycardia)  -     Ambulatory referral/consult to Cardiology; Future    Acoustic neuroma    Low vitamin B12 level  -     Vitamin B12; Future    Urinary frequency  -     Ambulatory referral/consult to Urology; Future  -     Urinalysis, Reflex to Urine Culture Urine, Clean Catch; Future    Erectile dysfunction, unspecified erectile dysfunction type  -     tadalafiL (CIALIS) 10 MG tablet; Take 1 tablet (10 mg total) by mouth daily as needed for Erectile Dysfunction.    Left-sided acoustic neuroma    Mixed hyperlipidemia   Controlled and asymptomatic.  Continue current Rx regimen.           Assessment & Plan    ACOUSTIC NEUROMA:  - Noted significant hearing loss, especially in noisy environments.  - Reviewed audiologist's recommendation against cochlear implant due to schwannoma presence.  - Suggested trying AirPods Pro 2 as a potential hearing aid alternative for mild to moderate hearing loss, with the option to consider traditional hearing aids in the future if needed.    BENIGN PROSTATIC HYPERPLASIA WITH LOWER URINARY TRACT SYMPTOMS:  - Assessed urinary symptoms, suspecting enlarged prostate as the cause.  - Valentin reports frequent and urgent urination, especially at night and when it's cold,  with 10 times during the day and 4 times at night.  - Valentin denies difficulty going or emptying bladder, but symptoms have been gradually worsening over time.  - Considered starting Flomax for 3 months to address frequent urination (pending urology consultation).  - Ordered PSA test to rule out prostate cancer and urinalysis to check for urinary symptoms.  - Referred to urology for evaluation of frequent urination and potential enlarged prostate.  - Scheduled follow-up after urology consultation to discuss findings and potential Flomax trial.    ERECTILE DYSFUNCTION:  - Evaluated erectile function concerns, attributing issues to normal aging process and psychological factors.  - Valentin reports erectile issues, particularly when fully awake.  - Discussed complex nature of erectile function system and how aging affects its performance.  - Continued Tadalafil as needed, with awareness of potential palpitation side effect.    PALPITATIONS:  - Evaluated palpitations after Tadalafil use, determining it as a known side effect rather than atrial fibrillation.  - Valentin reports experiencing palpitations at night after taking Tadalafil (Cialis).  - Explained physiological effects of Tadalafil on blood vessels and subsequent heart rate increase.  - Educated on difference between medication-induced tachycardia and atrial fibrillation/SVT.  - Instructed patient to use watch's rhythm check feature when experiencing palpitations to ensure it is not atrial fibrillation.  - Continued Metoprolol as currently prescribed.  - Recommend using a rhythm check device when experiencing palpitations and taking emergency medication if symptoms persist for 15-20 minutes.  - Advised to contact the office if palpitations persist or worsen after using Tadalafil.    FALL:  - Noted patient reports a recent fall while climbing stairs due to fatigue.         Visit today is associated with current or anticipated ongoing medical care related to this  patient's single serious condition/complex condition of acoustic neuroma, SVT. The patient will return to see me as these issues will be followed longitudinally.    This note was generated with the assistance of ambient listening technology. Verbal consent was obtained by the patient and accompanying visitor(s) for the recording of patient appointment to facilitate this note. I attest to having reviewed and edited the generated note for accuracy, though some syntax or spelling errors may persist. Please contact the author of this note for any clarification.      Francisco Huynh MD  Internal Medicine-[facility]        Side effects of medication(s) were discussed in detail and patient voiced understanding.  Patient will call back for any issues or complications.

## 2025-03-19 NOTE — TELEPHONE ENCOUNTER
KASIA with scheduled Cardiology appointment of 4-, and contact number provided for any questions. My Chart message to be sent.

## 2025-03-20 ENCOUNTER — RESULTS FOLLOW-UP (OUTPATIENT)
Dept: INTERNAL MEDICINE | Facility: CLINIC | Age: 65
End: 2025-03-20

## 2025-03-21 ENCOUNTER — PATIENT MESSAGE (OUTPATIENT)
Dept: INTERNAL MEDICINE | Facility: CLINIC | Age: 65
End: 2025-03-21
Payer: COMMERCIAL

## 2025-03-21 NOTE — LETTER
[facility]  [address]  [place] [address]  Phone: [phone]  Fax: [phone] March 21, 2025    Valentin Howell  [address] [place] [address]      To Whom It May Concern:    Valentin Howell is unable to participate in jury duty due to a hearing condition patient has. Making it difficulty for patient to hear important evidence and arguments between  and the .    If you have any questions or concerns, please feel free to call my office.    Sincerely,        Francisco Mcnally MD

## 2025-03-26 NOTE — PROGRESS NOTES
"     Cardiology Clinic Note  Reason for Visit: SVT    HPI:     Valentin Howell is a 64 y.o. M, who presents for f/u SVT.    History of Present Illness    Patient presents today for follow up of cardiac symptoms. He experienced two severe cardiac episodes lasting approximately 30 minutes each. Initially, he was not taking metoprolol nightly, was taking it as PRN medication. After last episode, he began taking it regularly nightly. He reports no further episodes in the past two weeks and states feeling well. His nitroglycerin prescription has  and needs renewal, though he reports not using it. He takes metoprolol XL (long-acting) daily and has rapid-release metoprolol available for PRN use. He reports low energy and significant fatigue after walking, noting that after a one-hour walk he requires a week of recovery before feeling able to walk again. 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 He reports brief episodes of stopping breathing during sleep. Prior to marriage, he experienced an episode of breathing difficulty during sleep where he had to wake his brother for assistance. A previous sleep study was scheduled but not completed. Tonsillectomy at age 15.         Medical: SVT, HLD, GERD  Surgical: Reviewed, as below.  Family: Reviewed, as below. No premature CAD, HF, SCD.  Social: Reviewed, as below. Never smoked. Children are cardiologists.    Physical Exam:   /84 (Patient Position: Sitting)   Pulse (!) 55   Ht 6' 5" (1.956 m)   Wt 109 kg (240 lb 4.8 oz)   SpO2 95%   BMI 28.50 kg/m²      Constitutional: No apparent distress, conversant  Neck: No jugular venous distension, no carotid bruits  CV: Regular rate and rhythm, no murmurs   Pulm: Clear to auscultation bilaterally  Extremities: No lower extremity edema, warm with palpable pulses    Imaging:     Echocardiogram  TTE 23    Left Ventricle: The left ventricle is normal in size. Ventricular " mass is normal. Normal wall thickness. There is concentric remodeling. Normal wall motion. There is normal systolic function with a visually estimated ejection fraction of 55 - 70%. There is normal diastolic function.    Left Atrium: Left atrium is mildly dilated.    Right Ventricle: Normal right ventricular cavity size. Systolic function is normal.    Aortic Valve: Mild annular calcification.    IVC/SVC: Normal venous pressure at 3 mmHg.    Stress testing  SPECT 20    The perfusion scan is free of evidence from myocardial ischemia or injury.    Gated perfusion images showed an ejection fraction of 60% at rest and 68% post stress. Normal ejection fraction is greater than 51%.    There is normal wall motion at rest and post stress.    LV cavity size is normal at rest and normal at stress.    The EKG portion of this study is negative for ischemia.    The patient reported no chest pain during the stress test.    There were no arrhythmias during stress.    There are no prior studies for comparison.    Cath Lab  None    Other  VENKAT 20  Impression:  Normal pre and post-exercise ABIs bilaterally.    EK23 - NSR, RBBB (personally reviewed)    Assessment:     1. SVT (supraventricular tachycardia)    2. Mixed hyperlipidemia      Plan:     SVT (supraventricular tachycardia)  Echo without structural abnormalities.  SPECT without ischemia.    Continue scheduled toprol 25mg qhs. Has lopressor prn.  If persistent symptoms or worsening symptoms, consider ablation    HLD  Continue statin  LDL <100    Fatigue  Has symptoms of BRYAN, scheduled with sleep med for BRYAN evaluation      Signed:  Lobito Ramsey MD  Cardiology     Follow-up:     Future Appointments   Date Time Provider Department Center   2025  8:30 AM Nguyen Monge NP [facility]     This note was generated with the assistance of ambient listening technology. Verbal consent was obtained by the patient and accompanying visitor(s) for the  recording of patient appointment to facilitate this note. I attest to having reviewed and edited the generated note for accuracy, though some syntax or spelling errors may persist. Please contact the author of this note for any clarification.

## 2025-04-02 ENCOUNTER — OFFICE VISIT (OUTPATIENT)
Dept: UROLOGY | Facility: CLINIC | Age: 65
End: 2025-04-02
Attending: INTERNAL MEDICINE
Payer: COMMERCIAL

## 2025-04-02 VITALS — SYSTOLIC BLOOD PRESSURE: 122 MMHG | DIASTOLIC BLOOD PRESSURE: 77 MMHG | HEART RATE: 66 BPM

## 2025-04-02 DIAGNOSIS — R35.1 NOCTURIA: Primary | ICD-10-CM

## 2025-04-02 DIAGNOSIS — R39.15 URINARY URGENCY: ICD-10-CM

## 2025-04-02 DIAGNOSIS — R35.0 URINARY FREQUENCY: ICD-10-CM

## 2025-04-02 PROCEDURE — 3074F SYST BP LT 130 MM HG: CPT | Mod: CPTII,S$GLB,, | Performed by: NURSE PRACTITIONER

## 2025-04-02 PROCEDURE — 3078F DIAST BP <80 MM HG: CPT | Mod: CPTII,S$GLB,, | Performed by: NURSE PRACTITIONER

## 2025-04-02 PROCEDURE — 1160F RVW MEDS BY RX/DR IN RCRD: CPT | Mod: CPTII,S$GLB,, | Performed by: NURSE PRACTITIONER

## 2025-04-02 PROCEDURE — 3044F HG A1C LEVEL LT 7.0%: CPT | Mod: CPTII,S$GLB,, | Performed by: NURSE PRACTITIONER

## 2025-04-02 PROCEDURE — 99204 OFFICE O/P NEW MOD 45 MIN: CPT | Mod: S$GLB,,, | Performed by: NURSE PRACTITIONER

## 2025-04-02 PROCEDURE — 1159F MED LIST DOCD IN RCRD: CPT | Mod: CPTII,S$GLB,, | Performed by: NURSE PRACTITIONER

## 2025-04-02 RX ORDER — UBIDECARENONE 30 MG
30 CAPSULE ORAL 3 TIMES DAILY
COMMUNITY

## 2025-04-02 RX ORDER — VIBEGRON 75 MG/1
75 TABLET, FILM COATED ORAL DAILY
Qty: 30 TABLET | Refills: 11 | Status: SHIPPED | OUTPATIENT
Start: 2025-04-02 | End: 2026-04-02

## 2025-04-02 RX ORDER — MULTIVITAMIN WITH IRON
TABLET ORAL DAILY
COMMUNITY

## 2025-04-02 RX ORDER — VITAMIN B COMPLEX
1 CAPSULE ORAL DAILY
COMMUNITY

## 2025-04-02 NOTE — PROGRESS NOTES
Subjective:      Valentin Howell is a 64 y.o. male who was referred by Dr. Mcnally for evaluation of his urinary symptoms.    The patient presents today reporting urinary frequency, urgency and nocturia often 2-3x/night, however sometimes hourly. Symptoms have been presents for a year- worsening. Symptoms are aggravated by cold weather. He drinks minimal water. Sometimes drinks several bottles of water before bed. + bladder irritants- citrus. He often limits water intake when he knows he will be traveling.     He denies dysuria, gross hematuria.   Denies a history of recurrent UTIs and nephrolithiasis.   One episode of painless hematospermia several years ago while traveling.     Denies a family history of prostate cancer.      Prostate Specific Antigen   Latest Ref Rng 0.00 - 4.00 ng/mL   3/6/2023 0.78    3/8/2024 0.82    3/19/2025 0.83      The following portions of the patient's history were reviewed and updated as appropriate: allergies, current medications, past family history, past medical history, past social history, past surgical history and problem list.    Review of Systems  Constitutional: no fever or chills  ENT: no nasal congestion or sore throat  Respiratory: no cough or shortness of breath  Cardiovascular: no chest pain or palpitations  Gastrointestinal: no nausea or vomiting, tolerating diet  Genitourinary: as per HPI  Hematologic/Lymphatic: no easy bruising or lymphadenopathy  Musculoskeletal: no arthralgias or myalgias  Neurological: no seizures or tremors  Behavioral/Psych: no auditory or visual hallucinations     Objective:   Vitals:   Vitals:    04/02/25 0731   BP: 122/77   Pulse: 66     Physical Exam   General: alert and oriented, no acute distress  Head: normocephalic, atraumatic  Neck: supple, normal ROM  Respiratory: Symmetric expansion, non-labored breathing  Cardiovascular: regular rate and rhythm  Abdomen: soft, non tender, non distended  Genitourinary: deferred  Skin: normal coloration  and turgor, no rashes, no suspicious skin lesions noted  Neuro: alert and oriented x3, no gross deficits  Psych: normal judgment and insight, normal mood/affect, and non-anxious    Lab Review   Urinalysis demonstrates negative for all components  PVR: 19 mL  Lab Results   Component Value Date    WBC 7.34 03/19/2025    HGB 15.3 03/19/2025    HCT 47.0 03/19/2025    MCV 90 03/19/2025     03/19/2025     Lab Results   Component Value Date    CREATININE 0.9 03/19/2025    BUN 11 03/19/2025     Lab Results   Component Value Date    PSA 0.83 03/19/2025     Imaging   None    Assessment:     1. Nocturia    2. Urinary frequency    3. Urinary urgency      Plan:   Diagnoses and all orders for this visit:    Nocturia    Urinary frequency  -     Ambulatory referral/consult to Urology  -     POCT URINE DIPSTICK WITHOUT MICROSCOPE  -     POCT Bladder Scan  -     vibegron (GEMTESA) 75 mg Tab; Take 1 tablet (75 mg total) by mouth once daily.    Urinary urgency    Plan:  --UA non concerning for infection and PVR low   --Discussed common bladder irritants such as caffeine, alcohol, citrus, and acidic and spicy foods. Encouraged to minimize in diet to reduce symptoms.  --Reduce PM fluids  --Trial of gemtesa  --Follow up with me in 3 months

## 2025-04-04 ENCOUNTER — OFFICE VISIT (OUTPATIENT)
Dept: CARDIOLOGY | Facility: CLINIC | Age: 65
End: 2025-04-04
Payer: COMMERCIAL

## 2025-04-04 VITALS
DIASTOLIC BLOOD PRESSURE: 84 MMHG | HEIGHT: 77 IN | OXYGEN SATURATION: 95 % | WEIGHT: 240.31 LBS | BODY MASS INDEX: 28.37 KG/M2 | HEART RATE: 55 BPM | SYSTOLIC BLOOD PRESSURE: 121 MMHG

## 2025-04-04 DIAGNOSIS — E78.2 MIXED HYPERLIPIDEMIA: ICD-10-CM

## 2025-04-04 DIAGNOSIS — I47.10 SVT (SUPRAVENTRICULAR TACHYCARDIA): Primary | ICD-10-CM

## 2025-04-04 PROBLEM — R07.9 CHEST PAIN: Status: RESOLVED | Noted: 2023-08-05 | Resolved: 2025-04-04

## 2025-04-04 PROCEDURE — 99999 PR PBB SHADOW E&M-EST. PATIENT-LVL IV: CPT | Mod: PBBFAC,,, | Performed by: INTERNAL MEDICINE

## 2025-04-04 RX ORDER — METOPROLOL SUCCINATE 25 MG/1
25 TABLET, EXTENDED RELEASE ORAL DAILY
COMMUNITY

## 2025-04-04 RX ORDER — NITROGLYCERIN 0.4 MG/1
0.4 TABLET SUBLINGUAL EVERY 5 MIN PRN
Qty: 15 TABLET | Refills: 3 | Status: SHIPPED | OUTPATIENT
Start: 2025-04-04

## 2025-04-19 ENCOUNTER — PATIENT MESSAGE (OUTPATIENT)
Dept: UROLOGY | Facility: CLINIC | Age: 65
End: 2025-04-19
Payer: COMMERCIAL

## 2025-04-19 ENCOUNTER — PATIENT MESSAGE (OUTPATIENT)
Dept: INTERNAL MEDICINE | Facility: CLINIC | Age: 65
End: 2025-04-19
Payer: COMMERCIAL

## 2025-04-19 DIAGNOSIS — M54.9 BACK PAIN, UNSPECIFIED BACK LOCATION, UNSPECIFIED BACK PAIN LATERALITY, UNSPECIFIED CHRONICITY: Primary | ICD-10-CM

## 2025-04-21 NOTE — TELEPHONE ENCOUNTER
"PT MSG,    "Good morning,     Hope all is well and Happy Easter.  I am still dealing with my back pain and its getting worst.   One of my friends had a similar problem . He went to Elias Husain, PT,DPT, the [facility]. He treats with neuromuscular skeletal disorders. He told him the problem was that he had a leg longer than the other leg and he gave him an arc and all went well. My friend is a doctor and he does triathlons! What do you think ? Can I have a referral to go see Elias Husain. Thank you!     Best Regards,  Valentin Howell"    I think they switched the Pt panel? Please advise   "

## 2025-05-08 ENCOUNTER — OFFICE VISIT (OUTPATIENT)
Dept: SLEEP MEDICINE | Facility: CLINIC | Age: 65
End: 2025-05-08
Payer: COMMERCIAL

## 2025-05-08 VITALS
BODY MASS INDEX: 27.97 KG/M2 | HEART RATE: 73 BPM | DIASTOLIC BLOOD PRESSURE: 75 MMHG | WEIGHT: 236.88 LBS | HEIGHT: 77 IN | SYSTOLIC BLOOD PRESSURE: 109 MMHG

## 2025-05-08 DIAGNOSIS — R06.83 SNORING: ICD-10-CM

## 2025-05-08 DIAGNOSIS — I47.10 SVT (SUPRAVENTRICULAR TACHYCARDIA): ICD-10-CM

## 2025-05-08 DIAGNOSIS — R06.81 WITNESSED EPISODE OF APNEA: ICD-10-CM

## 2025-05-08 DIAGNOSIS — F51.01 PRIMARY INSOMNIA: Primary | ICD-10-CM

## 2025-05-08 DIAGNOSIS — R35.1 NOCTURIA: ICD-10-CM

## 2025-05-08 PROCEDURE — 3078F DIAST BP <80 MM HG: CPT | Mod: CPTII,S$GLB,, | Performed by: NURSE PRACTITIONER

## 2025-05-08 PROCEDURE — 3008F BODY MASS INDEX DOCD: CPT | Mod: CPTII,S$GLB,, | Performed by: NURSE PRACTITIONER

## 2025-05-08 PROCEDURE — 1160F RVW MEDS BY RX/DR IN RCRD: CPT | Mod: CPTII,S$GLB,, | Performed by: NURSE PRACTITIONER

## 2025-05-08 PROCEDURE — 99214 OFFICE O/P EST MOD 30 MIN: CPT | Mod: S$GLB,,, | Performed by: NURSE PRACTITIONER

## 2025-05-08 PROCEDURE — 3044F HG A1C LEVEL LT 7.0%: CPT | Mod: CPTII,S$GLB,, | Performed by: NURSE PRACTITIONER

## 2025-05-08 PROCEDURE — 1159F MED LIST DOCD IN RCRD: CPT | Mod: CPTII,S$GLB,, | Performed by: NURSE PRACTITIONER

## 2025-05-08 PROCEDURE — 3074F SYST BP LT 130 MM HG: CPT | Mod: CPTII,S$GLB,, | Performed by: NURSE PRACTITIONER

## 2025-05-08 PROCEDURE — 99999 PR PBB SHADOW E&M-EST. PATIENT-LVL IV: CPT | Mod: PBBFAC,,, | Performed by: NURSE PRACTITIONER

## 2025-05-08 NOTE — PROGRESS NOTES
"  ESTABLISHED PATIENT VISIT    Valentin Howell  is a pleasant 64 y.o. male established with the [facility].    Here today for:  follow-up     Since last visit:   See assessment below      Past Medical History:   Diagnosis Date    Acid reflux     SVT (supraventricular tachycardia)      Problem List[1]  Current Medications[2]       Vitals:    05/08/25 0920   BP: 109/75   BP Location: Left arm   Patient Position: Sitting   Pulse: 73   Weight: 107.5 kg (236 lb 14.2 oz)   Height: 6' 5" (1.956 m)     Physical Exam:    GEN:   Well-appearing  Psych:  Appropriate affect, demonstrates insight  SKIN:  No rash on the face or bridge of the nose      LABS:   Lab Results   Component Value Date    HGB 15.3 03/19/2025    CO2 26 03/19/2025         RECORDS REVIEWED:    Echocardiogram  TTE 8/6/23  ·  Left Ventricle: The left ventricle is normal in size. Ventricular mass is normal. Normal wall thickness. There is concentric remodeling. Normal wall motion. There is normal systolic function with a visually estimated ejection fraction of 55 - 70%. There is normal diastolic function.  ·  Left Atrium: Left atrium is mildly dilated.  ·  Right Ventricle: Normal right ventricular cavity size. Systolic function is normal.  ·  Aortic Valve: Mild annular calcification.  ·  IVC/SVC: Normal venous pressure at 3 mmHg.     Stress testing  SPECT 6/26/20  ·  The perfusion scan is free of evidence from myocardial ischemia or injury.  ·  Gated perfusion images showed an ejection fraction of 60% at rest and 68% post stress. Normal ejection fraction is greater than 51%.  ·  There is normal wall motion at rest and post stress.  ·  LV cavity size is normal at rest and normal at stress.  ·  The EKG portion of this study is negative for ischemia.  ·  The patient reported no chest pain during the stress test.  ·  There were no arrhythmias during stress.  ·  There are no prior studies for comparison.      Sig PMHx:GERD, SVT, HLD  Pertinent surgical hx: " [place]        5/6/2025     1:44 PM   Sleep Clinic ROS    Difficulty breathing through the nose?  No   Sore throat or dry mouth in the morning? Yes   Irregular or very fast heart beat?  Yes   Shortness of breath?  No   Acid reflux? Yes   Body aches and pains?  Yes   Morning headaches? No   Dizziness? No   Mood changes?  Yes   Do you exercise?  No   Do you feel like moving your legs a lot?  No   Do you snore?    Do you wake up gasping or coughing?    Have people told you that you stop breathing at night?    Do you have nightsweats?      PROBLEM DESCRIPTION/ Sx on Presentation  STATUS PLAN     Sx  BRYAN   Presentation:     LOV: Arlene 4.12.21 - initial visit for snoring, gasping arousals, palpitations. Referred for sleep study, but reports he was never contacted.    Referred back by cardiology.    He has having increasing episodes of SVT.  Taking medications now, but may need an ablation.      new   -we discussed sleep testing to evaluate for BRYAN     -discussed possible treatments for BRYAN including CPAP therapy    -In light of recurrent SVT, recommend in-lab study.  PSG ordered.     Daytime symptoms         ESS 10/24 on intake      new   -will reassess sleepiness after evaluation for BRYAN     Insomnia     Has overactive bladder.    no - difficulty with sleep onset    yes - difficulty with sleep maintenance      SLEEP SCHEDULE   Duration    Wind- down    Envmnt    CBTi    Meds prior    Meds now    Bed Time 830-9PM   Lights out    Latency 2 min   Arousals 3-5x   Back to sleep 2 min   Avg TST 8+   Wake time 530AM (work)  630AM (off)   Caffeine    Naps 1x/day x 2-3hrs, refreshing   Nocturia 3   Work                new   -will reassess after evaluation for sleep-disordered breathing       Nocturia     x 3 per sleep period    new          RTC:  will arrange RTC depending on results of sleep testing         PLAN      -recommend sleep testing   -PSGordered  -discussed trial therapy if BRYAN present and the patient is open to a trial of  CPAP therapy  -discussed the etiology of obstructive sleep apnea as well as the potential ramifications of untreated sleep apnea, which could include daytime sleepiness, hypertension, heart disease and/or stroke. We discussed potential treatment options, which could include weight loss, body positioning, continuous positive airway pressure (CPAP), oral appliance, Inspire, or referral for surgical consideration.        Advised on plan of care. Answered all patient questions. Patient verbalized understanding and voiced agreement with plan of care.                       [1]   Patient Active Problem List  Diagnosis    GERD (gastroesophageal reflux disease)    SVT (supraventricular tachycardia)    It band syndrome, unspecified laterality    Chronic bilateral low back pain without sciatica    Hyperlipidemia    Decreased strength of trunk and back    Poor posture    Decreased ROM of trunk and back    Weakness of both lower extremities    Left-sided acoustic neuroma    Decreased functional activity tolerance   [2]   Current Outpatient Medications:     b complex vitamins capsule, Take 1 capsule by mouth once daily., Disp: , Rfl:     co-enzyme Q-10 30 mg capsule, Take 30 mg by mouth 3 (three) times daily., Disp: , Rfl:     LIDOcaine (LIDODERM) 5 %, Place 1 patch onto the skin once daily. Remove & Discard patch within 12 hours or as directed by MD, Disp: 30 patch, Rfl: 2    metoprolol succinate (TOPROL-XL) 25 MG 24 hr tablet, Take 25 mg by mouth once daily., Disp: , Rfl:     metoprolol tartrate (LOPRESSOR) 25 MG tablet, Take 25 mg by mouth as needed (Tachycardia/palpitations)., Disp: , Rfl:     nitroGLYCERIN (NITROSTAT) 0.4 MG SL tablet, Place 1 tablet (0.4 mg total) under the tongue every 5 (five) minutes as needed for Chest pain., Disp: 15 tablet, Rfl: 3    omega-3 fatty acids/fish oil (FISH OIL-OMEGA-3 FATTY ACIDS) 300-1,000 mg capsule, Take by mouth once daily., Disp: , Rfl:     pravastatin (PRAVACHOL) 40 MG tablet, Take  40 mg by mouth., Disp: , Rfl:     tadalafiL (CIALIS) 10 MG tablet, Take 1 tablet (10 mg total) by mouth daily as needed for Erectile Dysfunction., Disp: 15 tablet, Rfl: 1    vibegron (GEMTESA) 75 mg Tab, Take 1 tablet (75 mg total) by mouth once daily., Disp: 30 tablet, Rfl: 11

## 2025-05-15 ENCOUNTER — PATIENT MESSAGE (OUTPATIENT)
Dept: SLEEP MEDICINE | Facility: CLINIC | Age: 65
End: 2025-05-15
Payer: COMMERCIAL

## 2025-05-15 DIAGNOSIS — R35.1 NOCTURIA: ICD-10-CM

## 2025-05-15 DIAGNOSIS — F51.01 PRIMARY INSOMNIA: ICD-10-CM

## 2025-05-15 DIAGNOSIS — R06.81 WITNESSED EPISODE OF APNEA: ICD-10-CM

## 2025-05-15 DIAGNOSIS — I47.10 SVT (SUPRAVENTRICULAR TACHYCARDIA): Primary | ICD-10-CM

## 2025-05-15 DIAGNOSIS — R06.83 SNORING: ICD-10-CM

## 2025-05-20 ENCOUNTER — TELEPHONE (OUTPATIENT)
Dept: SLEEP MEDICINE | Facility: OTHER | Age: 65
End: 2025-05-20
Payer: COMMERCIAL

## 2025-05-26 ENCOUNTER — HOSPITAL ENCOUNTER (OUTPATIENT)
Dept: SLEEP MEDICINE | Facility: OTHER | Age: 65
Discharge: HOME | End: 2025-05-26
Attending: NURSE PRACTITIONER
Payer: COMMERCIAL

## 2025-05-26 DIAGNOSIS — R35.1 NOCTURIA: ICD-10-CM

## 2025-05-26 DIAGNOSIS — R06.83 SNORING: ICD-10-CM

## 2025-05-26 DIAGNOSIS — F51.01 PRIMARY INSOMNIA: ICD-10-CM

## 2025-05-26 DIAGNOSIS — R06.81 WITNESSED EPISODE OF APNEA: ICD-10-CM

## 2025-05-26 DIAGNOSIS — I47.10 SVT (SUPRAVENTRICULAR TACHYCARDIA): ICD-10-CM

## 2025-05-26 PROCEDURE — 95800 SLP STDY UNATTENDED: CPT

## 2025-05-28 ENCOUNTER — PATIENT MESSAGE (OUTPATIENT)
Dept: SLEEP MEDICINE | Facility: CLINIC | Age: 65
End: 2025-05-28
Payer: COMMERCIAL

## 2025-05-28 DIAGNOSIS — G47.33 OSA (OBSTRUCTIVE SLEEP APNEA): Primary | ICD-10-CM
